# Patient Record
Sex: FEMALE | Race: WHITE | NOT HISPANIC OR LATINO | Employment: PART TIME | URBAN - METROPOLITAN AREA
[De-identification: names, ages, dates, MRNs, and addresses within clinical notes are randomized per-mention and may not be internally consistent; named-entity substitution may affect disease eponyms.]

---

## 2023-02-25 PROBLEM — D48.5 BASAL CELL NEOPLASM: Status: ACTIVE | Noted: 2023-02-25

## 2023-02-25 PROBLEM — K06.8 BLEEDING GUMS: Status: ACTIVE | Noted: 2023-02-25

## 2023-02-25 PROBLEM — S42.252A FRACTURE OF GREATER TUBEROSITY OF LEFT HUMERUS: Status: ACTIVE | Noted: 2023-02-25

## 2023-02-25 PROBLEM — I10 HYPERTENSION, ESSENTIAL: Status: ACTIVE | Noted: 2023-02-25

## 2023-02-25 PROBLEM — S46.012A TRAUMATIC TEAR OF LEFT ROTATOR CUFF: Status: ACTIVE | Noted: 2023-02-25

## 2023-02-25 PROBLEM — M25.519 SHOULDER PAIN: Status: ACTIVE | Noted: 2023-02-25

## 2023-02-25 PROBLEM — T78.40XA ALLERGIES: Status: ACTIVE | Noted: 2023-02-25

## 2023-02-25 LAB
ACTIVATED PARTIAL THROMBOPLASTIN TIME IN PPP BY COAGULATION ASSAY: 25 SEC (ref 26–39)
ALANINE AMINOTRANSFERASE (SGPT) (U/L) IN SER/PLAS: 46 U/L (ref 7–45)
ALBUMIN (G/DL) IN SER/PLAS: 4.6 G/DL (ref 3.4–5)
ALKALINE PHOSPHATASE (U/L) IN SER/PLAS: 70 U/L (ref 33–110)
ANION GAP IN SER/PLAS: 13 MMOL/L (ref 10–20)
ASPARTATE AMINOTRANSFERASE (SGOT) (U/L) IN SER/PLAS: 24 U/L (ref 9–39)
BASOPHILS (10*3/UL) IN BLOOD BY AUTOMATED COUNT: 0.06 X10E9/L (ref 0–0.1)
BASOPHILS/100 LEUKOCYTES IN BLOOD BY AUTOMATED COUNT: 0.8 % (ref 0–2)
BILIRUBIN TOTAL (MG/DL) IN SER/PLAS: 0.5 MG/DL (ref 0–1.2)
CALCIDIOL (25 OH VITAMIN D3) (NG/ML) IN SER/PLAS: 85 NG/ML
CALCIUM (MG/DL) IN SER/PLAS: 9.7 MG/DL (ref 8.6–10.3)
CARBON DIOXIDE, TOTAL (MMOL/L) IN SER/PLAS: 28 MMOL/L (ref 21–32)
CHLORIDE (MMOL/L) IN SER/PLAS: 102 MMOL/L (ref 98–107)
CHOLESTEROL (MG/DL) IN SER/PLAS: 261 MG/DL (ref 0–199)
CHOLESTEROL IN HDL (MG/DL) IN SER/PLAS: 19 MG/DL
CHOLESTEROL/HDL RATIO: 13.7
COBALAMIN (VITAMIN B12) (PG/ML) IN SER/PLAS: 590 PG/ML (ref 211–911)
CREATININE (MG/DL) IN SER/PLAS: 0.83 MG/DL (ref 0.5–1.05)
EOSINOPHILS (10*3/UL) IN BLOOD BY AUTOMATED COUNT: 0.26 X10E9/L (ref 0–0.7)
EOSINOPHILS/100 LEUKOCYTES IN BLOOD BY AUTOMATED COUNT: 3.4 % (ref 0–6)
ERYTHROCYTE DISTRIBUTION WIDTH (RATIO) BY AUTOMATED COUNT: 13.1 % (ref 11.5–14.5)
ERYTHROCYTE MEAN CORPUSCULAR HEMOGLOBIN CONCENTRATION (G/DL) BY AUTOMATED: 32.3 G/DL (ref 32–36)
ERYTHROCYTE MEAN CORPUSCULAR VOLUME (FL) BY AUTOMATED COUNT: 92 FL (ref 80–100)
ERYTHROCYTES (10*6/UL) IN BLOOD BY AUTOMATED COUNT: 4.63 X10E12/L (ref 4–5.2)
ESTIMATED AVERAGE GLUCOSE FOR HBA1C: 117 MG/DL
FERRITIN (UG/LL) IN SER/PLAS: 237 UG/L (ref 8–150)
GFR FEMALE: 82 ML/MIN/1.73M2
GLUCOSE (MG/DL) IN SER/PLAS: 102 MG/DL (ref 74–99)
HEMATOCRIT (%) IN BLOOD BY AUTOMATED COUNT: 42.7 % (ref 36–46)
HEMOGLOBIN (G/DL) IN BLOOD: 13.8 G/DL (ref 12–16)
HEMOGLOBIN A1C/HEMOGLOBIN TOTAL IN BLOOD: 5.7 %
IMMATURE GRANULOCYTES/100 LEUKOCYTES IN BLOOD BY AUTOMATED COUNT: 0.8 % (ref 0–0.9)
INR IN PPP BY COAGULATION ASSAY: 0.9 (ref 0.9–1.1)
IRON (UG/DL) IN SER/PLAS: 109 UG/DL (ref 35–150)
IRON BINDING CAPACITY (UG/DL) IN SER/PLAS: 367 UG/DL (ref 240–445)
IRON SATURATION (%) IN SER/PLAS: 30 % (ref 25–45)
LDL: 187 MG/DL (ref 0–99)
LEUKOCYTES (10*3/UL) IN BLOOD BY AUTOMATED COUNT: 7.7 X10E9/L (ref 4.4–11.3)
LYMPHOCYTES (10*3/UL) IN BLOOD BY AUTOMATED COUNT: 2.79 X10E9/L (ref 1.2–4.8)
LYMPHOCYTES/100 LEUKOCYTES IN BLOOD BY AUTOMATED COUNT: 36.1 % (ref 13–44)
MONOCYTES (10*3/UL) IN BLOOD BY AUTOMATED COUNT: 0.71 X10E9/L (ref 0.1–1)
MONOCYTES/100 LEUKOCYTES IN BLOOD BY AUTOMATED COUNT: 9.2 % (ref 2–10)
NEUTROPHILS (10*3/UL) IN BLOOD BY AUTOMATED COUNT: 3.85 X10E9/L (ref 1.2–7.7)
NEUTROPHILS/100 LEUKOCYTES IN BLOOD BY AUTOMATED COUNT: 49.7 % (ref 40–80)
NON HDL CHOLESTEROL: 242 MG/DL
PLATELETS (10*3/UL) IN BLOOD AUTOMATED COUNT: 334 X10E9/L (ref 150–450)
POTASSIUM (MMOL/L) IN SER/PLAS: 4.8 MMOL/L (ref 3.5–5.3)
PROTEIN TOTAL: 7.3 G/DL (ref 6.4–8.2)
PROTHROMBIN TIME (PT) IN PPP BY COAGULATION ASSAY: 10.8 SEC (ref 9.8–13.4)
SODIUM (MMOL/L) IN SER/PLAS: 138 MMOL/L (ref 136–145)
THYROTROPIN (MIU/L) IN SER/PLAS BY DETECTION LIMIT <= 0.05 MIU/L: 2.2 MIU/L (ref 0.44–3.98)
TRIGLYCERIDE (MG/DL) IN SER/PLAS: 274 MG/DL (ref 0–149)
UREA NITROGEN (MG/DL) IN SER/PLAS: 19 MG/DL (ref 6–23)
VLDL: 55 MG/DL (ref 0–40)

## 2023-02-25 RX ORDER — BUSPIRONE HYDROCHLORIDE 15 MG/1
0.5 TABLET ORAL DAILY
COMMUNITY
End: 2024-04-12

## 2023-02-25 RX ORDER — OMEPRAZOLE 20 MG/1
20 TABLET, DELAYED RELEASE ORAL
COMMUNITY

## 2023-02-25 RX ORDER — ASCORBIC ACID 500 MG
500 TABLET ORAL
COMMUNITY

## 2023-02-25 RX ORDER — MESALAMINE 0.38 G/1
0.38 CAPSULE, EXTENDED RELEASE ORAL
COMMUNITY
End: 2023-04-19 | Stop reason: SDUPTHER

## 2023-02-25 RX ORDER — COLESEVELAM 180 1/1
625 TABLET ORAL
COMMUNITY

## 2023-02-25 RX ORDER — TALC
3 POWDER (GRAM) TOPICAL NIGHTLY
COMMUNITY
End: 2023-09-13 | Stop reason: ALTCHOICE

## 2023-02-25 RX ORDER — ACETAMINOPHEN 500 MG
TABLET ORAL DAILY
COMMUNITY

## 2023-02-25 RX ORDER — MONTELUKAST SODIUM 10 MG/1
1 TABLET ORAL DAILY
COMMUNITY
End: 2024-03-25

## 2023-02-25 RX ORDER — EPINEPHRINE 0.3 MG/.3ML
INJECTION INTRAMUSCULAR AS NEEDED
COMMUNITY

## 2023-02-25 RX ORDER — CETIRIZINE HYDROCHLORIDE 10 MG/1
10 TABLET ORAL
COMMUNITY

## 2023-02-25 RX ORDER — NORTRIPTYLINE HYDROCHLORIDE 50 MG/1
50 CAPSULE ORAL NIGHTLY
COMMUNITY
End: 2023-04-19 | Stop reason: SDUPTHER

## 2023-03-07 ENCOUNTER — TELEPHONE (OUTPATIENT)
Dept: PRIMARY CARE | Facility: CLINIC | Age: 58
End: 2023-03-07
Payer: COMMERCIAL

## 2023-03-07 DIAGNOSIS — E78.00 ELEVATED CHOLESTEROL: ICD-10-CM

## 2023-03-07 DIAGNOSIS — R79.89 ELEVATED FERRITIN LEVEL: ICD-10-CM

## 2023-03-10 NOTE — TELEPHONE ENCOUNTER
2/26  bp 164/81 P 103  140/64 P 98  2/27 143/77 P 101  3/1  1743/72 p 94  3/2 157/85 P 98    3/4 140/82 P 98  3/5  140/78  P 94  3/7  147/80 P 89  Bp readings since starting medications    Patient currently on Metoprolol Succinate ER 25mg every day

## 2023-03-24 ENCOUNTER — OFFICE VISIT (OUTPATIENT)
Dept: PRIMARY CARE | Facility: CLINIC | Age: 58
End: 2023-03-24
Payer: COMMERCIAL

## 2023-03-24 VITALS
WEIGHT: 152 LBS | TEMPERATURE: 98 F | OXYGEN SATURATION: 96 % | DIASTOLIC BLOOD PRESSURE: 82 MMHG | HEART RATE: 92 BPM | SYSTOLIC BLOOD PRESSURE: 143 MMHG

## 2023-03-24 DIAGNOSIS — F41.9 ANXIETY: ICD-10-CM

## 2023-03-24 DIAGNOSIS — I10 HYPERTENSION, ESSENTIAL: Primary | ICD-10-CM

## 2023-03-24 DIAGNOSIS — Z12.4 SCREENING FOR CERVICAL CANCER: ICD-10-CM

## 2023-03-24 PROBLEM — E78.5 HYPERLIPIDEMIA: Status: ACTIVE | Noted: 2023-03-24

## 2023-03-24 PROCEDURE — 3077F SYST BP >= 140 MM HG: CPT | Performed by: INTERNAL MEDICINE

## 2023-03-24 PROCEDURE — 88175 CYTOPATH C/V AUTO FLUID REDO: CPT

## 2023-03-24 PROCEDURE — 1036F TOBACCO NON-USER: CPT | Performed by: INTERNAL MEDICINE

## 2023-03-24 PROCEDURE — 99213 OFFICE O/P EST LOW 20 MIN: CPT | Performed by: INTERNAL MEDICINE

## 2023-03-24 PROCEDURE — 3079F DIAST BP 80-89 MM HG: CPT | Performed by: INTERNAL MEDICINE

## 2023-03-24 RX ORDER — LISINOPRIL 10 MG/1
10 TABLET ORAL DAILY
Qty: 30 TABLET | Refills: 5 | Status: SHIPPED | OUTPATIENT
Start: 2023-03-24 | End: 2023-09-13

## 2023-03-24 ASSESSMENT — ENCOUNTER SYMPTOMS
CHEST TIGHTNESS: 0
VOMITING: 0
ABDOMINAL PAIN: 0
NUMBNESS: 0
WEAKNESS: 0
BLOOD IN STOOL: 0
SHORTNESS OF BREATH: 0
DIARRHEA: 0
FEVER: 0
ARTHRALGIAS: 0
BACK PAIN: 0
APPETITE CHANGE: 0
FREQUENCY: 0

## 2023-03-24 ASSESSMENT — PATIENT HEALTH QUESTIONNAIRE - PHQ9
SUM OF ALL RESPONSES TO PHQ9 QUESTIONS 1 AND 2: 0
2. FEELING DOWN, DEPRESSED OR HOPELESS: NOT AT ALL
1. LITTLE INTEREST OR PLEASURE IN DOING THINGS: NOT AT ALL

## 2023-03-24 NOTE — PROGRESS NOTES
Subjective   Patient ID: Jolly Adame is a 57 y.o. female who presents for No chief complaint on file..  HPI  Bp 140-150   Stress with work , living out of boxes  No cp , no sob  Not depressed  Exercising now      Review of Systems   Constitutional:  Negative for appetite change and fever.   HENT:  Negative for ear discharge and ear pain.    Respiratory:  Negative for chest tightness and shortness of breath.    Cardiovascular:  Negative for chest pain and leg swelling.   Gastrointestinal:  Negative for abdominal pain, blood in stool, diarrhea and vomiting.   Genitourinary:  Negative for frequency.   Musculoskeletal:  Negative for arthralgias and back pain.   Skin:  Negative for rash.   Neurological:  Negative for weakness and numbness.   Psychiatric/Behavioral:  Negative for behavioral problems.         No depression or anxiety interfering with daily life.       Objective   Physical Exam  Exam conducted with a chaperone present.   Constitutional:       General: She is not in acute distress.     Appearance: Normal appearance.   HENT:      Head: Normocephalic.   Cardiovascular:      Rate and Rhythm: Normal rate and regular rhythm.      Heart sounds: Normal heart sounds. No murmur heard.  Pulmonary:      Effort: Pulmonary effort is normal.      Breath sounds: Normal breath sounds.   Chest:   Breasts:     Right: Normal.      Left: Normal.   Abdominal:      General: There is no distension.      Palpations: Abdomen is soft.      Tenderness: There is no abdominal tenderness. There is no guarding.   Genitourinary:     General: Normal vulva.      Comments: Nl exam, no masses, pap done  Musculoskeletal:         General: Normal range of motion.      Cervical back: Normal range of motion and neck supple.   Skin:     General: Skin is warm and dry.   Neurological:      General: No focal deficit present.      Mental Status: She is alert.   Psychiatric:         Mood and Affect: Mood normal.       Lab Results   Component Value  Date    WBC 7.7 02/25/2023    HGB 13.8 02/25/2023    HCT 42.7 02/25/2023    MCV 92 02/25/2023     02/25/2023     Lab Results   Component Value Date    GLUCOSE 102 (H) 02/25/2023    CALCIUM 9.7 02/25/2023     02/25/2023    K 4.8 02/25/2023    CO2 28 02/25/2023     02/25/2023    BUN 19 02/25/2023    CREATININE 0.83 02/25/2023     Social History     Socioeconomic History    Marital status:      Spouse name: Not on file    Number of children: Not on file    Years of education: Not on file    Highest education level: Not on file   Occupational History    Not on file   Tobacco Use    Smoking status: Not on file    Smokeless tobacco: Not on file   Vaping Use    Vaping status: Not on file   Substance and Sexual Activity    Alcohol use: Not on file    Drug use: Not on file    Sexual activity: Not on file   Other Topics Concern    Not on file   Social History Narrative    Not on file     Social Determinants of Health     Financial Resource Strain: Not on file   Food Insecurity: Not on file   Transportation Needs: Not on file   Physical Activity: Not on file   Stress: Not on file   Social Connections: Not on file   Intimate Partner Violence: Not on file   Housing Stability: Not on file     Family History   Problem Relation Name Age of Onset    Stroke Mother      Dementia Mother      Heart attack Mother      Diabetes Sister      Cancer Daughter      Breast cancer Other Grandmother        Assessment/Plan   Problem List Items Addressed This Visit    Kailey Azevedo was seen today for hypertension and gynecologic exam.  Diagnoses and all orders for this visit:  Hypertension, essential (Primary)  -     lisinopril 10 mg tablet; Take 1 tablet (10 mg) by mouth once daily.  Anxiety  Screening for cervical cancer  -     THINPREP PAP TEST  -     THINPREP PAP TEST    Chronic conditions reviewed in the assessment and plan.    Continue medications unless specified otherwise.  Previous labs reviewed.        There are no  diagnoses linked to this encounter.

## 2023-03-30 LAB
COMPLETE PATHOLOGY REPORT: NORMAL
CONVERTED CLINICAL DIAGNOSIS-HISTORY: NORMAL
CONVERTED DIAGNOSIS COMMENT: NORMAL
CONVERTED FINAL DIAGNOSIS: NORMAL
CONVERTED FINAL REPORT PDF LINK TO COPY AND PASTE: NORMAL

## 2023-04-06 ENCOUNTER — LAB (OUTPATIENT)
Dept: LAB | Facility: LAB | Age: 58
End: 2023-04-06
Payer: COMMERCIAL

## 2023-04-06 DIAGNOSIS — R79.89 ELEVATED FERRITIN LEVEL: ICD-10-CM

## 2023-04-06 DIAGNOSIS — E78.00 ELEVATED CHOLESTEROL: ICD-10-CM

## 2023-04-06 LAB
ALANINE AMINOTRANSFERASE (SGPT) (U/L) IN SER/PLAS: 40 U/L (ref 7–45)
ALBUMIN (G/DL) IN SER/PLAS: 4.4 G/DL (ref 3.4–5)
ALKALINE PHOSPHATASE (U/L) IN SER/PLAS: 71 U/L (ref 33–110)
ASPARTATE AMINOTRANSFERASE (SGOT) (U/L) IN SER/PLAS: 26 U/L (ref 9–39)
BASOPHILS (10*3/UL) IN BLOOD BY AUTOMATED COUNT: 0.04 X10E9/L (ref 0–0.1)
BASOPHILS/100 LEUKOCYTES IN BLOOD BY AUTOMATED COUNT: 0.6 % (ref 0–2)
BILIRUBIN DIRECT (MG/DL) IN SER/PLAS: 0.1 MG/DL (ref 0–0.3)
BILIRUBIN TOTAL (MG/DL) IN SER/PLAS: 0.7 MG/DL (ref 0–1.2)
CHOLESTEROL (MG/DL) IN SER/PLAS: 252 MG/DL (ref 0–199)
CHOLESTEROL IN HDL (MG/DL) IN SER/PLAS: 20.7 MG/DL
CHOLESTEROL/HDL RATIO: 12.2
EOSINOPHILS (10*3/UL) IN BLOOD BY AUTOMATED COUNT: 0.21 X10E9/L (ref 0–0.7)
EOSINOPHILS/100 LEUKOCYTES IN BLOOD BY AUTOMATED COUNT: 3.3 % (ref 0–6)
ERYTHROCYTE DISTRIBUTION WIDTH (RATIO) BY AUTOMATED COUNT: 12.7 % (ref 11.5–14.5)
ERYTHROCYTE MEAN CORPUSCULAR HEMOGLOBIN CONCENTRATION (G/DL) BY AUTOMATED: 32.4 G/DL (ref 32–36)
ERYTHROCYTE MEAN CORPUSCULAR VOLUME (FL) BY AUTOMATED COUNT: 92 FL (ref 80–100)
ERYTHROCYTES (10*6/UL) IN BLOOD BY AUTOMATED COUNT: 4.49 X10E12/L (ref 4–5.2)
FERRITIN (UG/LL) IN SER/PLAS: 201 UG/L (ref 8–150)
HEMATOCRIT (%) IN BLOOD BY AUTOMATED COUNT: 41.4 % (ref 36–46)
HEMOGLOBIN (G/DL) IN BLOOD: 13.4 G/DL (ref 12–16)
IMMATURE GRANULOCYTES/100 LEUKOCYTES IN BLOOD BY AUTOMATED COUNT: 0.5 % (ref 0–0.9)
IRON (UG/DL) IN SER/PLAS: 146 UG/DL (ref 35–150)
IRON BINDING CAPACITY (UG/DL) IN SER/PLAS: 373 UG/DL (ref 240–445)
IRON SATURATION (%) IN SER/PLAS: 39 % (ref 25–45)
LDL: 175 MG/DL (ref 0–99)
LEUKOCYTES (10*3/UL) IN BLOOD BY AUTOMATED COUNT: 6.5 X10E9/L (ref 4.4–11.3)
LYMPHOCYTES (10*3/UL) IN BLOOD BY AUTOMATED COUNT: 2.56 X10E9/L (ref 1.2–4.8)
LYMPHOCYTES/100 LEUKOCYTES IN BLOOD BY AUTOMATED COUNT: 39.6 % (ref 13–44)
MONOCYTES (10*3/UL) IN BLOOD BY AUTOMATED COUNT: 0.44 X10E9/L (ref 0.1–1)
MONOCYTES/100 LEUKOCYTES IN BLOOD BY AUTOMATED COUNT: 6.8 % (ref 2–10)
NEUTROPHILS (10*3/UL) IN BLOOD BY AUTOMATED COUNT: 3.18 X10E9/L (ref 1.2–7.7)
NEUTROPHILS/100 LEUKOCYTES IN BLOOD BY AUTOMATED COUNT: 49.2 % (ref 40–80)
NON HDL CHOLESTEROL: 231 MG/DL
PLATELETS (10*3/UL) IN BLOOD AUTOMATED COUNT: 310 X10E9/L (ref 150–450)
PROTEIN TOTAL: 6.8 G/DL (ref 6.4–8.2)
SEDIMENTATION RATE, ERYTHROCYTE: 10 MM/H (ref 0–30)
TRIGLYCERIDE (MG/DL) IN SER/PLAS: 283 MG/DL (ref 0–149)
VLDL: 57 MG/DL (ref 0–40)

## 2023-04-06 PROCEDURE — 85652 RBC SED RATE AUTOMATED: CPT

## 2023-04-06 PROCEDURE — 82728 ASSAY OF FERRITIN: CPT

## 2023-04-06 PROCEDURE — 80076 HEPATIC FUNCTION PANEL: CPT

## 2023-04-06 PROCEDURE — 36415 COLL VENOUS BLD VENIPUNCTURE: CPT

## 2023-04-06 PROCEDURE — 80061 LIPID PANEL: CPT

## 2023-04-06 PROCEDURE — 83540 ASSAY OF IRON: CPT

## 2023-04-06 PROCEDURE — 83550 IRON BINDING TEST: CPT

## 2023-04-06 PROCEDURE — 85025 COMPLETE CBC W/AUTO DIFF WBC: CPT

## 2023-04-07 DIAGNOSIS — E78.5 HYPERLIPIDEMIA, UNSPECIFIED HYPERLIPIDEMIA TYPE: ICD-10-CM

## 2023-04-10 RX ORDER — SIMVASTATIN 20 MG/1
20 TABLET, FILM COATED ORAL NIGHTLY
Qty: 30 TABLET | Refills: 11 | Status: SHIPPED | OUTPATIENT
Start: 2023-04-10 | End: 2024-04-12

## 2023-04-19 DIAGNOSIS — F41.9 ANXIETY: ICD-10-CM

## 2023-04-19 DIAGNOSIS — K51.919 ULCERATIVE COLITIS WITH COMPLICATION, UNSPECIFIED LOCATION (MULTI): ICD-10-CM

## 2023-04-19 DIAGNOSIS — I10 HYPERTENSION, ESSENTIAL: Primary | ICD-10-CM

## 2023-04-19 DIAGNOSIS — E78.5 HYPERLIPIDEMIA, UNSPECIFIED HYPERLIPIDEMIA TYPE: ICD-10-CM

## 2023-04-19 RX ORDER — NORTRIPTYLINE HYDROCHLORIDE 50 MG/1
CAPSULE ORAL
Qty: 60 CAPSULE | Refills: 11 | Status: SHIPPED | OUTPATIENT
Start: 2023-04-19 | End: 2023-07-19 | Stop reason: SDUPTHER

## 2023-04-19 RX ORDER — METOPROLOL SUCCINATE 25 MG/1
25 TABLET, EXTENDED RELEASE ORAL 2 TIMES DAILY
Qty: 60 TABLET | Refills: 11 | Status: SHIPPED | OUTPATIENT
Start: 2023-04-19 | End: 2024-04-10

## 2023-04-19 RX ORDER — MESALAMINE 0.38 G/1
1.5 CAPSULE, EXTENDED RELEASE ORAL DAILY
Qty: 90 CAPSULE | Refills: 3 | Status: SHIPPED | OUTPATIENT
Start: 2023-04-19 | End: 2023-07-05

## 2023-06-07 ENCOUNTER — TELEPHONE (OUTPATIENT)
Dept: PRIMARY CARE | Facility: CLINIC | Age: 58
End: 2023-06-07

## 2023-06-07 NOTE — TELEPHONE ENCOUNTER
Type of form: Physical form for  leader/ last CPE 3/24/41929  Received from: patient's spouse via walk in for completion and provider's signature   Call patient when completed  Form placed in PCP box front office.

## 2023-06-08 PROBLEM — S42.255A: Status: ACTIVE | Noted: 2022-12-01

## 2023-06-08 PROBLEM — G47.9 SLEEP DIFFICULTIES: Status: ACTIVE | Noted: 2023-06-08

## 2023-06-08 PROBLEM — F41.9 ANXIETY DISORDER, UNSPECIFIED: Status: ACTIVE | Noted: 2023-03-24

## 2023-06-08 PROBLEM — R06.83 SNORING: Status: ACTIVE | Noted: 2023-06-08

## 2023-06-08 PROBLEM — U07.1 COVID-19: Status: ACTIVE | Noted: 2022-11-09

## 2023-07-05 DIAGNOSIS — K51.919 ULCERATIVE COLITIS WITH COMPLICATION, UNSPECIFIED LOCATION (MULTI): ICD-10-CM

## 2023-07-05 DIAGNOSIS — F41.9 ANXIETY: ICD-10-CM

## 2023-07-05 DIAGNOSIS — I10 HYPERTENSION, ESSENTIAL: ICD-10-CM

## 2023-07-05 DIAGNOSIS — E78.5 HYPERLIPIDEMIA, UNSPECIFIED HYPERLIPIDEMIA TYPE: ICD-10-CM

## 2023-07-05 RX ORDER — MESALAMINE 0.38 G/1
CAPSULE, EXTENDED RELEASE ORAL
Qty: 90 CAPSULE | Refills: 0 | Status: SHIPPED | OUTPATIENT
Start: 2023-07-05 | End: 2023-08-04

## 2023-07-07 ENCOUNTER — APPOINTMENT (OUTPATIENT)
Dept: PRIMARY CARE | Facility: CLINIC | Age: 58
End: 2023-07-07
Payer: COMMERCIAL

## 2023-07-19 ENCOUNTER — APPOINTMENT (OUTPATIENT)
Dept: PRIMARY CARE | Facility: CLINIC | Age: 58
End: 2023-07-19
Payer: COMMERCIAL

## 2023-07-19 ENCOUNTER — TELEMEDICINE (OUTPATIENT)
Dept: PRIMARY CARE | Facility: CLINIC | Age: 58
End: 2023-07-19
Payer: COMMERCIAL

## 2023-07-19 DIAGNOSIS — J01.10 ACUTE NON-RECURRENT FRONTAL SINUSITIS: Primary | ICD-10-CM

## 2023-07-19 DIAGNOSIS — J98.8 RESPIRATORY INFECTION: ICD-10-CM

## 2023-07-19 DIAGNOSIS — F41.9 ANXIETY: ICD-10-CM

## 2023-07-19 PROCEDURE — 99213 OFFICE O/P EST LOW 20 MIN: CPT | Performed by: NURSE PRACTITIONER

## 2023-07-19 RX ORDER — BENZONATATE 200 MG/1
200 CAPSULE ORAL 3 TIMES DAILY PRN
Qty: 42 CAPSULE | Refills: 0 | Status: SHIPPED | OUTPATIENT
Start: 2023-07-19 | End: 2023-08-18

## 2023-07-19 RX ORDER — AMOXICILLIN AND CLAVULANATE POTASSIUM 875; 125 MG/1; MG/1
875 TABLET, FILM COATED ORAL 2 TIMES DAILY
Qty: 14 TABLET | Refills: 0 | Status: SHIPPED | OUTPATIENT
Start: 2023-07-19 | End: 2023-07-26

## 2023-07-19 RX ORDER — CODEINE PHOSPHATE AND GUAIFENESIN 10; 100 MG/5ML; MG/5ML
10 SOLUTION ORAL EVERY 6 HOURS PRN
Qty: 300 ML | Refills: 0 | Status: SHIPPED | OUTPATIENT
Start: 2023-07-19 | End: 2023-07-26

## 2023-07-19 RX ORDER — NORTRIPTYLINE HYDROCHLORIDE 50 MG/1
CAPSULE ORAL
Qty: 60 CAPSULE | Refills: 11 | Status: SHIPPED | OUTPATIENT
Start: 2023-07-19 | End: 2023-07-24 | Stop reason: SDUPTHER

## 2023-07-19 NOTE — PROGRESS NOTES
An interactive audio and video telecommunication system which permits real time communications between the patient (at the originating site) and provider (at the distant site) was utilized to provide this telehealth service.  Verbal consent was requested and obtained from the patient for this telehealth visit.     Subjective   Patient ID: Jolly Adame is a 58 y.o. female who presents for Cough.    Ongoing dry cough x 1 week  Started about 10 days ago  Sx HA, fatigue, nasal congestion, coughing very bad at night that she vomits  Recent travel on a cruise  Was feeling ill on the trip  Covid negative last test was 7/14/23  Can sleep due to coughing  Using cough drops  Causing nausea and vomiting  Cough syrup not working  Felt better than worse        Review of Systems  ROS completely negative except what was mentioned in the HPI.  Problem List, surgical, social, and family histories which were reviewed and updated as necessary within the EMR. I also personally reviewed the notes, labs, and imaging that pertained to what was documented or discussed in the HPI.      Objective   Physical Exam  Vitals and nursing note reviewed.   Constitutional:       Appearance: Normal appearance.   HENT:      Head: Normocephalic and atraumatic.      Right Ear: External ear normal.      Left Ear: External ear normal.      Nose: Nose normal.      Mouth/Throat:      Mouth: Mucous membranes are moist.   Eyes:      Extraocular Movements: Extraocular movements intact.      Conjunctiva/sclera: Conjunctivae normal.   Pulmonary:      Effort: Pulmonary effort is normal.   Musculoskeletal:      Cervical back: Normal range of motion and neck supple.   Neurological:      General: No focal deficit present.      Mental Status: She is alert and oriented to person, place, and time. Mental status is at baseline.   Psychiatric:         Mood and Affect: Mood normal.         Behavior: Behavior normal.         Thought Content: Thought content normal.          Judgment: Judgment normal.         There were no vitals taken for this visit.    Assessment/Plan    Problem List Items Addressed This Visit       Anxiety    Relevant Medications    nortriptyline (Pamelor) 50 mg capsule    Respiratory infection    Current Assessment & Plan     Will begin tx with augmentin, tessalon  Cough with codeine on hold at pharmacy if she decides to fill  She will notify if not improving, consider cxr         Relevant Medications    amoxicillin-pot clavulanate (Augmentin) 875-125 mg tablet    codeine-guaifenesin (Robitussin-AC)  mg/5 mL syrup    benzonatate (Tessalon) 200 mg capsule     Other Visit Diagnoses       Acute non-recurrent frontal sinusitis    -  Primary    Relevant Medications    amoxicillin-pot clavulanate (Augmentin) 875-125 mg tablet    codeine-guaifenesin (Robitussin-AC)  mg/5 mL syrup

## 2023-07-24 PROBLEM — J98.8 RESPIRATORY INFECTION: Status: ACTIVE | Noted: 2023-07-24

## 2023-07-24 PROBLEM — U07.1 COVID-19: Status: RESOLVED | Noted: 2022-11-09 | Resolved: 2023-07-24

## 2023-07-24 RX ORDER — NORTRIPTYLINE HYDROCHLORIDE 50 MG/1
CAPSULE ORAL
Qty: 60 CAPSULE | Refills: 11
Start: 2023-07-24

## 2023-07-24 NOTE — ASSESSMENT & PLAN NOTE
Will begin tx with augmentin, tessalon  Cough with codeine on hold at pharmacy if she decides to fill  She will notify if not improving, consider cxr

## 2023-08-04 DIAGNOSIS — F41.9 ANXIETY: ICD-10-CM

## 2023-08-04 DIAGNOSIS — I10 HYPERTENSION, ESSENTIAL: ICD-10-CM

## 2023-08-04 DIAGNOSIS — K51.919 ULCERATIVE COLITIS WITH COMPLICATION, UNSPECIFIED LOCATION (MULTI): ICD-10-CM

## 2023-08-04 DIAGNOSIS — E78.5 HYPERLIPIDEMIA, UNSPECIFIED HYPERLIPIDEMIA TYPE: ICD-10-CM

## 2023-08-04 RX ORDER — MESALAMINE 0.38 G/1
CAPSULE, EXTENDED RELEASE ORAL
Qty: 90 CAPSULE | Refills: 0 | Status: SHIPPED | OUTPATIENT
Start: 2023-08-04 | End: 2023-09-05 | Stop reason: SDUPTHER

## 2023-09-05 DIAGNOSIS — F41.9 ANXIETY: ICD-10-CM

## 2023-09-05 DIAGNOSIS — E78.5 HYPERLIPIDEMIA, UNSPECIFIED HYPERLIPIDEMIA TYPE: ICD-10-CM

## 2023-09-05 DIAGNOSIS — K51.919 ULCERATIVE COLITIS WITH COMPLICATION, UNSPECIFIED LOCATION (MULTI): ICD-10-CM

## 2023-09-05 DIAGNOSIS — I10 HYPERTENSION, ESSENTIAL: ICD-10-CM

## 2023-09-05 RX ORDER — MESALAMINE 0.38 G/1
1.5 CAPSULE, EXTENDED RELEASE ORAL DAILY
Qty: 360 CAPSULE | Refills: 3 | Status: SHIPPED
Start: 2023-09-05 | End: 2023-09-08 | Stop reason: SDUPTHER

## 2023-09-08 DIAGNOSIS — E78.5 HYPERLIPIDEMIA, UNSPECIFIED HYPERLIPIDEMIA TYPE: ICD-10-CM

## 2023-09-08 DIAGNOSIS — K51.919 ULCERATIVE COLITIS WITH COMPLICATION, UNSPECIFIED LOCATION (MULTI): ICD-10-CM

## 2023-09-08 DIAGNOSIS — F41.9 ANXIETY: ICD-10-CM

## 2023-09-08 DIAGNOSIS — I10 HYPERTENSION, ESSENTIAL: ICD-10-CM

## 2023-09-08 RX ORDER — MESALAMINE 0.38 G/1
1.5 CAPSULE, EXTENDED RELEASE ORAL DAILY
Qty: 360 CAPSULE | Refills: 3 | Status: SHIPPED | OUTPATIENT
Start: 2023-09-08 | End: 2024-09-07

## 2023-09-13 ENCOUNTER — LAB (OUTPATIENT)
Dept: LAB | Facility: LAB | Age: 58
End: 2023-09-13
Payer: COMMERCIAL

## 2023-09-13 ENCOUNTER — OFFICE VISIT (OUTPATIENT)
Dept: PRIMARY CARE | Facility: CLINIC | Age: 58
End: 2023-09-13
Payer: COMMERCIAL

## 2023-09-13 VITALS
WEIGHT: 158 LBS | DIASTOLIC BLOOD PRESSURE: 96 MMHG | TEMPERATURE: 97.7 F | SYSTOLIC BLOOD PRESSURE: 156 MMHG | HEART RATE: 80 BPM | BODY MASS INDEX: 29.08 KG/M2 | HEIGHT: 62 IN | OXYGEN SATURATION: 98 %

## 2023-09-13 DIAGNOSIS — K52.9 COLITIS: ICD-10-CM

## 2023-09-13 DIAGNOSIS — I10 HYPERTENSION, ESSENTIAL: ICD-10-CM

## 2023-09-13 DIAGNOSIS — E78.2 MIXED HYPERLIPIDEMIA: ICD-10-CM

## 2023-09-13 DIAGNOSIS — E78.5 HYPERLIPIDEMIA, UNSPECIFIED HYPERLIPIDEMIA TYPE: ICD-10-CM

## 2023-09-13 DIAGNOSIS — R05.9 COUGH, UNSPECIFIED TYPE: ICD-10-CM

## 2023-09-13 DIAGNOSIS — F41.9 ANXIETY: ICD-10-CM

## 2023-09-13 DIAGNOSIS — I10 HYPERTENSION, ESSENTIAL: Primary | ICD-10-CM

## 2023-09-13 PROBLEM — J98.8 RESPIRATORY INFECTION: Status: RESOLVED | Noted: 2023-07-24 | Resolved: 2023-09-13

## 2023-09-13 LAB
ALANINE AMINOTRANSFERASE (SGPT) (U/L) IN SER/PLAS: 27 U/L (ref 7–45)
ALBUMIN (G/DL) IN SER/PLAS: 4.6 G/DL (ref 3.4–5)
ALKALINE PHOSPHATASE (U/L) IN SER/PLAS: 71 U/L (ref 33–110)
ANION GAP IN SER/PLAS: 12 MMOL/L (ref 10–20)
ASPARTATE AMINOTRANSFERASE (SGOT) (U/L) IN SER/PLAS: 18 U/L (ref 9–39)
BASOPHILS (10*3/UL) IN BLOOD BY AUTOMATED COUNT: 0.05 X10E9/L (ref 0–0.1)
BASOPHILS/100 LEUKOCYTES IN BLOOD BY AUTOMATED COUNT: 0.7 % (ref 0–2)
BILIRUBIN TOTAL (MG/DL) IN SER/PLAS: 0.4 MG/DL (ref 0–1.2)
CALCIDIOL (25 OH VITAMIN D3) (NG/ML) IN SER/PLAS: 54 NG/ML
CALCIUM (MG/DL) IN SER/PLAS: 9.7 MG/DL (ref 8.6–10.3)
CARBON DIOXIDE, TOTAL (MMOL/L) IN SER/PLAS: 30 MMOL/L (ref 21–32)
CHLORIDE (MMOL/L) IN SER/PLAS: 102 MMOL/L (ref 98–107)
CHOLESTEROL (MG/DL) IN SER/PLAS: 146 MG/DL (ref 0–199)
CHOLESTEROL IN HDL (MG/DL) IN SER/PLAS: 22.8 MG/DL
CHOLESTEROL/HDL RATIO: 6.4
CREATININE (MG/DL) IN SER/PLAS: 0.85 MG/DL (ref 0.5–1.05)
EOSINOPHILS (10*3/UL) IN BLOOD BY AUTOMATED COUNT: 0.25 X10E9/L (ref 0–0.7)
EOSINOPHILS/100 LEUKOCYTES IN BLOOD BY AUTOMATED COUNT: 3.6 % (ref 0–6)
ERYTHROCYTE DISTRIBUTION WIDTH (RATIO) BY AUTOMATED COUNT: 12.7 % (ref 11.5–14.5)
ERYTHROCYTE MEAN CORPUSCULAR HEMOGLOBIN CONCENTRATION (G/DL) BY AUTOMATED: 32.3 G/DL (ref 32–36)
ERYTHROCYTE MEAN CORPUSCULAR VOLUME (FL) BY AUTOMATED COUNT: 92 FL (ref 80–100)
ERYTHROCYTES (10*6/UL) IN BLOOD BY AUTOMATED COUNT: 4.43 X10E12/L (ref 4–5.2)
ESTIMATED AVERAGE GLUCOSE FOR HBA1C: 123 MG/DL
FERRITIN (UG/LL) IN SER/PLAS: 139 UG/L (ref 8–150)
GFR FEMALE: 80 ML/MIN/1.73M2
GLUCOSE (MG/DL) IN SER/PLAS: 112 MG/DL (ref 74–99)
HEMATOCRIT (%) IN BLOOD BY AUTOMATED COUNT: 40.9 % (ref 36–46)
HEMOGLOBIN (G/DL) IN BLOOD: 13.2 G/DL (ref 12–16)
HEMOGLOBIN A1C/HEMOGLOBIN TOTAL IN BLOOD: 5.9 %
IMMATURE GRANULOCYTES/100 LEUKOCYTES IN BLOOD BY AUTOMATED COUNT: 0.3 % (ref 0–0.9)
IRON (UG/DL) IN SER/PLAS: 87 UG/DL (ref 35–150)
IRON BINDING CAPACITY (UG/DL) IN SER/PLAS: 376 UG/DL (ref 240–445)
IRON SATURATION (%) IN SER/PLAS: 23 % (ref 25–45)
LDL: 85 MG/DL (ref 0–99)
LEUKOCYTES (10*3/UL) IN BLOOD BY AUTOMATED COUNT: 7 X10E9/L (ref 4.4–11.3)
LYMPHOCYTES (10*3/UL) IN BLOOD BY AUTOMATED COUNT: 2.33 X10E9/L (ref 1.2–4.8)
LYMPHOCYTES/100 LEUKOCYTES IN BLOOD BY AUTOMATED COUNT: 33.4 % (ref 13–44)
MONOCYTES (10*3/UL) IN BLOOD BY AUTOMATED COUNT: 0.55 X10E9/L (ref 0.1–1)
MONOCYTES/100 LEUKOCYTES IN BLOOD BY AUTOMATED COUNT: 7.9 % (ref 2–10)
NEUTROPHILS (10*3/UL) IN BLOOD BY AUTOMATED COUNT: 3.77 X10E9/L (ref 1.2–7.7)
NEUTROPHILS/100 LEUKOCYTES IN BLOOD BY AUTOMATED COUNT: 54.1 % (ref 40–80)
PLATELETS (10*3/UL) IN BLOOD AUTOMATED COUNT: 301 X10E9/L (ref 150–450)
POTASSIUM (MMOL/L) IN SER/PLAS: 4.4 MMOL/L (ref 3.5–5.3)
PROTEIN TOTAL: 6.8 G/DL (ref 6.4–8.2)
SEDIMENTATION RATE, ERYTHROCYTE: 2 MM/H (ref 0–30)
SODIUM (MMOL/L) IN SER/PLAS: 140 MMOL/L (ref 136–145)
THYROTROPIN (MIU/L) IN SER/PLAS BY DETECTION LIMIT <= 0.05 MIU/L: 1.98 MIU/L (ref 0.44–3.98)
TRIGLYCERIDE (MG/DL) IN SER/PLAS: 192 MG/DL (ref 0–149)
UREA NITROGEN (MG/DL) IN SER/PLAS: 10 MG/DL (ref 6–23)
VLDL: 38 MG/DL (ref 0–40)

## 2023-09-13 PROCEDURE — 82306 VITAMIN D 25 HYDROXY: CPT

## 2023-09-13 PROCEDURE — 83550 IRON BINDING TEST: CPT

## 2023-09-13 PROCEDURE — 86003 ALLG SPEC IGE CRUDE XTRC EA: CPT

## 2023-09-13 PROCEDURE — 82728 ASSAY OF FERRITIN: CPT

## 2023-09-13 PROCEDURE — 85652 RBC SED RATE AUTOMATED: CPT

## 2023-09-13 PROCEDURE — 83540 ASSAY OF IRON: CPT

## 2023-09-13 PROCEDURE — 84443 ASSAY THYROID STIM HORMONE: CPT

## 2023-09-13 PROCEDURE — 36415 COLL VENOUS BLD VENIPUNCTURE: CPT

## 2023-09-13 PROCEDURE — 85025 COMPLETE CBC W/AUTO DIFF WBC: CPT

## 2023-09-13 PROCEDURE — 82785 ASSAY OF IGE: CPT

## 2023-09-13 PROCEDURE — 80053 COMPREHEN METABOLIC PANEL: CPT

## 2023-09-13 PROCEDURE — 83036 HEMOGLOBIN GLYCOSYLATED A1C: CPT

## 2023-09-13 PROCEDURE — 3077F SYST BP >= 140 MM HG: CPT | Performed by: INTERNAL MEDICINE

## 2023-09-13 PROCEDURE — 80061 LIPID PANEL: CPT

## 2023-09-13 PROCEDURE — 1036F TOBACCO NON-USER: CPT | Performed by: INTERNAL MEDICINE

## 2023-09-13 PROCEDURE — 99214 OFFICE O/P EST MOD 30 MIN: CPT | Performed by: INTERNAL MEDICINE

## 2023-09-13 PROCEDURE — 3080F DIAST BP >= 90 MM HG: CPT | Performed by: INTERNAL MEDICINE

## 2023-09-13 RX ORDER — DILTIAZEM HYDROCHLORIDE 120 MG/1
120 CAPSULE, COATED, EXTENDED RELEASE ORAL DAILY
Qty: 30 CAPSULE | Refills: 5 | Status: SHIPPED | OUTPATIENT
Start: 2023-09-13 | End: 2024-03-07

## 2023-09-13 RX ORDER — GLUCOSAMINE/MSM/CHONDROIT SULF 500-166.6
1 TABLET ORAL DAILY
COMMUNITY
Start: 2023-02-24 | End: 2023-09-13 | Stop reason: ALTCHOICE

## 2023-09-13 RX ORDER — ACETAMINOPHEN, DIPHENHYDRAMINE HCL, PHENYLEPHRINE HCL 325; 25; 5 MG/1; MG/1; MG/1
1 TABLET ORAL NIGHTLY
COMMUNITY

## 2023-09-13 RX ORDER — LISINOPRIL 10 MG/1
10 TABLET ORAL DAILY
Qty: 30 TABLET | Refills: 11 | Status: SHIPPED | OUTPATIENT
Start: 2023-09-13 | End: 2023-09-13 | Stop reason: SINTOL

## 2023-09-13 NOTE — PROGRESS NOTES
"Subjective   Patient ID: Jolly Adame is a 58 y.o. female who presents for Follow-up (Cough since June ongoing/Was ill in June and had covid not been able to get rid of the cough/).  HPI  Went on cruise  June 29th  Did vv cough syrup w codeine  Neg covid  Test pos   Kids better  No fever  Can dry heave due to cough  Ho allergies this time of year  Dry heaving  Overall busy  Gone a lot  2 times cp when anxious only  otherwise none  No sob  No arm pain     Review of Systems  Gen:  no fever  HEENT:  no trouble swallowing  CV:  no dyspnea, cyanosis  Lungs:  no shortness of breath  GI:  no constipation, no blood in stool  Vascular:  no edema  Neuro:   no weakness  Skin:  no rash  MS:no joint swelling  Gu:  no urinary complaints  All other systems have been reviewed and are negative for complaint    BP (!) 156/96   Pulse 80   Temp 36.5 °C (97.7 °F) (Temporal)   Ht 1.575 m (5' 2\")   Wt 71.7 kg (158 lb)   SpO2 98%   BMI 28.90 kg/m²   Objective   Physical Exam  Lab Results   Component Value Date    WBC 7.0 09/13/2023    HGB 13.2 09/13/2023    HCT 40.9 09/13/2023    MCV 92 09/13/2023     09/13/2023     Lab Results   Component Value Date    GLUCOSE 112 (H) 09/13/2023    CALCIUM 9.7 09/13/2023     09/13/2023    K 4.4 09/13/2023    CO2 30 09/13/2023     09/13/2023    BUN 10 09/13/2023    CREATININE 0.85 09/13/2023     Social History     Socioeconomic History    Marital status:      Spouse name: None    Number of children: None    Years of education: None    Highest education level: None   Occupational History    None   Tobacco Use    Smoking status: Never    Smokeless tobacco: Never   Substance and Sexual Activity    Alcohol use: Yes    Drug use: None    Sexual activity: None   Other Topics Concern    None   Social History Narrative    None     Social Determinants of Health     Financial Resource Strain: Not on file   Food Insecurity: Not on file   Transportation Needs: Not on file   Physical " Activity: Not on file   Stress: Not on file   Social Connections: Not on file   Intimate Partner Violence: Not on file   Housing Stability: Not on file     Family History   Problem Relation Name Age of Onset    Stroke Mother      Dementia Mother      Heart attack Mother      Diabetes Sister      Cancer Daughter      Breast cancer Other Grandmother        General:  Alery and in  NAD  Heent:  tms nl, throat clear.     Lungs, CTAB  Skin:  no suspicious lesions,  warm and dry  Head :  Normocephalic  Neck/thyroid:  neck supple, full rom, no cervical lymphadenopathy  no thyromegaly  Heart:  RRR  no murmurs  Abdomen:  Normal , bs present, soft, nontender, not distended, no masses palpated  Extremities:  No clubbing, cyanosis, or edema  Neurologic:  Nonfocal  Psych: alert, normal mood    Problem List Items Addressed This Visit       Hypertension, essential - Primary    Relevant Medications    dilTIAZem CD (Cardizem CD) 120 mg 24 hr capsule    Other Relevant Orders    Hemoglobin A1C (Completed)    Comprehensive Metabolic Panel    TSH with reflex to Free T4 if abnormal (Completed)    Vitamin D 25-Hydroxy,Total (for eval of Vitamin D levels) (Completed)    CBC and Auto Differential (Completed)    Lipid Panel    Iron and TIBC (Completed)    Ferritin (Completed)    CT cardiac scoring wo IV contrast    Hyperlipidemia    Relevant Orders    Hemoglobin A1C (Completed)    Comprehensive Metabolic Panel    TSH with reflex to Free T4 if abnormal (Completed)    Vitamin D 25-Hydroxy,Total (for eval of Vitamin D levels) (Completed)    CBC and Auto Differential (Completed)    Lipid Panel    Iron and TIBC (Completed)    Ferritin (Completed)    Anxiety    Relevant Orders    Hemoglobin A1C (Completed)    Comprehensive Metabolic Panel    TSH with reflex to Free T4 if abnormal (Completed)    Vitamin D 25-Hydroxy,Total (for eval of Vitamin D levels) (Completed)    CBC and Auto Differential (Completed)    Lipid Panel    Iron and TIBC (Completed)     Ferritin (Completed)     Other Visit Diagnoses       Cough, unspecified type        Relevant Orders    XR chest 2 views (Completed)    Respiratory Allergy Profile IgE (Completed)    Food Allergy Profile IgE (Completed)    Colitis        Relevant Orders    Referral to Gastroenterology    Sedimentation Rate (Completed)            Jolly was seen today for follow-up.  Diagnoses and all orders for this visit:  Hypertension, essential (Primary)  -     Hemoglobin A1C; Future  -     Comprehensive Metabolic Panel; Future  -     TSH with reflex to Free T4 if abnormal; Future  -     Vitamin D 25-Hydroxy,Total (for eval of Vitamin D levels); Future  -     CBC and Auto Differential; Future  -     Lipid Panel; Future  -     Iron and TIBC; Future  -     Ferritin; Future  -     dilTIAZem CD (Cardizem CD) 120 mg 24 hr capsule; Take 1 capsule (120 mg) by mouth once daily.  -     CT cardiac scoring wo IV contrast; Future  -     CT cardiac scoring wo IV contrast  Mixed hyperlipidemia  -     Hemoglobin A1C; Future  -     Comprehensive Metabolic Panel; Future  -     TSH with reflex to Free T4 if abnormal; Future  -     Vitamin D 25-Hydroxy,Total (for eval of Vitamin D levels); Future  -     CBC and Auto Differential; Future  -     Lipid Panel; Future  -     Iron and TIBC; Future  -     Ferritin; Future  Anxiety  -     Hemoglobin A1C; Future  -     Comprehensive Metabolic Panel; Future  -     TSH with reflex to Free T4 if abnormal; Future  -     Vitamin D 25-Hydroxy,Total (for eval of Vitamin D levels); Future  -     CBC and Auto Differential; Future  -     Lipid Panel; Future  -     Iron and TIBC; Future  -     Ferritin; Future  Cough, unspecified type  -     XR chest 2 views; Future  -     Respiratory Allergy Profile IgE; Future  -     Food Allergy Profile IgE; Future  -     XR chest 2 views  Colitis  -     Referral to Gastroenterology; Future  -     Sedimentation Rate; Future  Likely will need to inc diltiazem  Check bp at home ,  call with numbers after one week   Stop ace inh due to cough   Most likely post viral cough   Follow up in 3 months or prn.

## 2023-09-14 LAB
ALLERGEN ANIMAL: CAT DANDER IGE (KU/L): 0.16 KU/L
ALLERGEN ANIMAL: DOG DANDER IGE (KU/L): 0.19 KU/L
ALLERGEN FOOD: CLAM (RUDITAPES SPP.) IGE (KU/L): <0.1 KU/L
ALLERGEN FOOD: EGG WHITE IGE (KU/L): <0.1 KU/L
ALLERGEN FOOD: FISH (COD) GADUS MORHUA) IGE (KU/L): <0.1 KU/L
ALLERGEN FOOD: MAIZE, CORN (ZEA MAYS) IGE (KU/L): <0.1 KU/L
ALLERGEN FOOD: MILK IGE (KU/L): <0.1 KU/L
ALLERGEN FOOD: PEANUT (ARACHIS HYPOGAEA) IGE (KU/L): 0.23 KU/L
ALLERGEN FOOD: SCALLOP (PECTEN SPP.) IGE (KU/L): <0.1 KU/L
ALLERGEN FOOD: SESAME SEED (SESAMUM INDICUM) IGE (KU/L): 0.23 KU/L
ALLERGEN FOOD: SHRIMP (P. BOREALIS/MONODON, M. BARBATA/JOYNERI) IGE (KU/L): <0.1 KU/L
ALLERGEN FOOD: SOYBEAN (GLYCINE MAX) IGE (KU/L): 0.14 KU/L
ALLERGEN FOOD: WALNUT (JUGLANS SPP.) IGE (KU/L): <0.1 KU/L
ALLERGEN FOOD: WHEAT (TRITICUM AESTIVUM) IGE (KU/L): 0.16 KU/L
ALLERGEN GRASS: BERMUDA GRASS (CYNODON DACTYLON) IGE (KU/L): 0.29 KU/L
ALLERGEN GRASS: JOHNSON GRASS (SORGHUM HALEPENSE) IGE (KU/L): 0.4 KU/L
ALLERGEN GRASS: MEADOW GRASS, KENTUCKY BLUE (POA PRATENSIS )IGE (KU/L): 1.91 KU/L
ALLERGEN GRASS: TIMOTHY GRASS (PHLEUM PRATENSE) IGE (KU/L): 1.54 KU/L
ALLERGEN INSECT: COCKROACH IGE: 0.1 KU/L
ALLERGEN MICROORGANISM: ALTERNARIA ALTERNATA IGE (KU/L): 0.77 KU/L
ALLERGEN MICROORGANISM: ASPERGILLUS FUMIGATUS IGE (KU/L): <0.1 KU/L
ALLERGEN MICROORGANISM: CLADOSPORIUM HERBARUM IGE (KU/L): <0.1 KU/L
ALLERGEN MICROORGANISM: PENICILLIUM CHRYSOGENUM (P. NOTATUM) IGE (KU/L): <0.1 KU/L
ALLERGEN MITE: DERMATOPHAGOIDES FARINAE (HOUSE DUST MITE) IGE (KU/L): 1.03 KU/L
ALLERGEN MITE: DERMATOPHAGOIDES PTERONYSSINUS (HOUSE DUST MITE) IGE (KU/L): 0.86 KU/L
ALLERGEN TREE: BOX-ELDER (ACER NEGUNDO) IGE (KU/L): 0.17 KU/L
ALLERGEN TREE: COMMON SILVER BIRCH (BETULA VERRUCOSA) IGE (KU/L): <0.1 KU/L
ALLERGEN TREE: COTTONWOOD (POPULUS DELTOIDES) IGE (KU/L): 0.19 KU/L
ALLERGEN TREE: ELM (ULMUS AMERICANA) IGE (KU/L): 0.17 KU/L
ALLERGEN TREE: MAPLE LEAF SYCAMORE, LONDON PLANE IGE (KU/L): 0.2 KU/L
ALLERGEN TREE: MOUNTAIN JUNIPER (JUNIPERUS SABINOIDES) IGE (KU/L): 0.14 KU/L
ALLERGEN TREE: MULBERRY (MORUS ALBA) IGE (KU/L): <0.1 KU/L
ALLERGEN TREE: OAK (QUERCUS ALBA) IGE (KU/L): 0.24 KU/L
ALLERGEN TREE: PECAN, HICKORY (CARYA PECAN) IGE (KU/L): 0.13 KU/L
ALLERGEN TREE: WALNUT IGE: 0.18 KU/L
ALLERGEN TREE: WHITE ASH (FRAXINUS AMERICANA) IGE (KU/L): 0.23 KU/L
ALLERGEN WEED: COMMON PIGWEED (AMARANTHUS RETROFLEXUS) IGE (KU/L): 0.16 KU/L
ALLERGEN WEED: COMMON RAGWEED (AMB. ARTEMISIIFOLIA/A. ELATIOR) IGE (KU/L): 0.45 KU/L
ALLERGEN WEED: GOOSEFOOT, LAMB'S QUARTERS (CHENOPODIUM ALBUM) IGE (KU/L): 0.19 KU/L
ALLERGEN WEED: PLANTAIN (ENGLISH), RIBWORT (PLANTAGO LANCEOLATA) IGE (KU/L): 0.13 KU/L
ALLERGEN WEED: PRICKLY SALTWORT/RUSSIAN THISTLE (SALSOLA KALI) IGE (KU/L): 0.24 KU/L
ALLERGEN WEED: SHEEP SORREL (RUMEX ACETOSELLA) IGE (KU/L): 0.21 KU/L
IMMUNOCAP IGE: 56.9 KU/L (ref 0–214)
IMMUNOCAP INTERPRETATION: ABNORMAL
IMMUNOCAP INTERPRETATION: NORMAL

## 2023-10-04 ENCOUNTER — OFFICE VISIT (OUTPATIENT)
Dept: GASTROENTEROLOGY | Facility: CLINIC | Age: 58
End: 2023-10-04
Payer: COMMERCIAL

## 2023-10-04 ENCOUNTER — ANCILLARY PROCEDURE (OUTPATIENT)
Dept: RADIOLOGY | Facility: CLINIC | Age: 58
End: 2023-10-04

## 2023-10-04 VITALS
HEIGHT: 62 IN | HEART RATE: 85 BPM | SYSTOLIC BLOOD PRESSURE: 131 MMHG | DIASTOLIC BLOOD PRESSURE: 76 MMHG | WEIGHT: 157 LBS | BODY MASS INDEX: 28.89 KG/M2

## 2023-10-04 DIAGNOSIS — I10 ESSENTIAL (PRIMARY) HYPERTENSION: ICD-10-CM

## 2023-10-04 DIAGNOSIS — K51.00 ULCERATIVE PANCOLITIS WITHOUT COMPLICATION (MULTI): Primary | ICD-10-CM

## 2023-10-04 PROCEDURE — 1036F TOBACCO NON-USER: CPT | Performed by: REGISTERED NURSE

## 2023-10-04 PROCEDURE — 75571 CT HRT W/O DYE W/CA TEST: CPT

## 2023-10-04 PROCEDURE — 99204 OFFICE O/P NEW MOD 45 MIN: CPT | Performed by: REGISTERED NURSE

## 2023-10-04 PROCEDURE — 3078F DIAST BP <80 MM HG: CPT | Performed by: REGISTERED NURSE

## 2023-10-04 PROCEDURE — 3075F SYST BP GE 130 - 139MM HG: CPT | Performed by: REGISTERED NURSE

## 2023-10-04 RX ORDER — LISINOPRIL 10 MG/1
10 TABLET ORAL DAILY
COMMUNITY
Start: 2023-09-13 | End: 2023-10-12 | Stop reason: ALTCHOICE

## 2023-10-04 ASSESSMENT — ENCOUNTER SYMPTOMS
BLOOD IN STOOL: 0
MYALGIAS: 0
COUGH: 1
ENDOCRINE NEGATIVE: 1
ABDOMINAL DISTENTION: 0
SHORTNESS OF BREATH: 1
ANAL BLEEDING: 0
DIARRHEA: 1
EYES NEGATIVE: 1
CONSTITUTIONAL NEGATIVE: 1
RECTAL PAIN: 0
VOMITING: 0
NAUSEA: 0
ABDOMINAL PAIN: 0
ARTHRALGIAS: 0
CONSTIPATION: 1
JOINT SWELLING: 0

## 2023-10-04 NOTE — PROGRESS NOTES
REASON FOR VISIT:   Here to establish as a new patient for Ulcerative Colitis. GI MD - [facility] - Dr Beebe - [facility] - [phone]    HPI:  Jolly Adame is a 58 y.o. female who presents for Ulcerative Colitis, diagnosed at age 20. Takes Apriso 4 daily.  Alternating diarrhea and constipation for years.   Denies hematochezia or melena.  Occasional mucus.     States that she occasionally takes WelChol if she has severe diarrhea and called her GI doctor if she had rectal bleeding.      Denies reflux, dysphagia, early satiety, abdominal bloating, nausea or vomiting.  Her weight and appetite are stable.  She is actively trying to lose weight.     States that if she is going into work she does not eat or drink much because she does not want to have diarrhea.  She tends to be more salads and fruit when she is working from home and then has more frequent loose stool throughout the day.    Denies NSAID use    Med -  Apriso 4 daily     Labs - 9/13/23 - CBC, CMP, Ferritin     PMH -  Ulcerative Colitis, HLD,   PSH - Cholecystectomy, hysterectomy   Fam Hx -  No fam h/o Colon CA, sister has Crohn's    Previous endoscopic eval: Colonoscopy - not sure when ??     REVIEW OF SYSTEMS    Review of Systems   Constitutional: Negative.    HENT:  Negative for mouth sores.    Eyes: Negative.    Respiratory:  Positive for cough and shortness of breath.    Gastrointestinal:  Positive for constipation and diarrhea. Negative for abdominal distention, abdominal pain, anal bleeding, blood in stool, nausea, rectal pain and vomiting.   Endocrine: Negative.    Genitourinary: Negative.    Musculoskeletal:  Negative for arthralgias, joint swelling and myalgias.   Skin: Negative.           Allergies   Allergen Reactions    Azithromycin Unknown    Bactrim [Sulfamethoxazole-Trimethoprim] Unknown    Bee Venom Protein (Honey Bee) Unknown    Erythromycin Unknown    Ibuprofen Unknown    Paxlovid (Eua) [Nirmatrelvir-Ritonavir]  Unknown       Past Medical History:   Diagnosis Date    Abnormal MRI, shoulder 10/29/2022    Nondisplaced greater tuberosity left proximal humeral fracture.  Multiple foci of rotator cuff calcific tendinitis.  Small amount of bursal surface fraying of the distal supraspinatus tendon without evidence of full-thickness rotator cuff tear       Family History   Problem Relation Name Age of Onset    Stroke Mother      Dementia Mother      Heart attack Mother      Diabetes Sister      Crohn's disease Sister      Irritable bowel syndrome Sister      Cancer Daughter      Colon cancer Maternal Grandfather      Breast cancer Other Grandmother        Social History     Tobacco Use    Smoking status: Never    Smokeless tobacco: Never   Substance Use Topics    Alcohol use: Yes       Current Outpatient Medications   Medication Sig Dispense Refill    lisinopril 10 mg tablet Take 1 tablet (10 mg) by mouth once daily.      ascorbic acid (Vitamin C) 500 mg tablet Take 1 tablet (500 mg) by mouth.      busPIRone (Buspar) 15 mg tablet Take 0.5 tablets (7.5 mg) by mouth once daily.      cetirizine (ZyrTEC) 10 mg tablet Take 1 tablet (10 mg) by mouth.      cholecalciferol (Vitamin D-3) 50 mcg (2,000 unit) capsule Take by mouth once daily.      colesevelam (Welchol) 625 mg tablet Take 1 tablet (625 mg) by mouth. Take with meal(s) and a liquid.      dilTIAZem CD (Cardizem CD) 120 mg 24 hr capsule Take 1 capsule (120 mg) by mouth once daily. 30 capsule 5    docosahexaenoic acid/epa (FISH OIL ORAL) Take 2 capsules by mouth once daily.      EPINEPHrine (EpiPen) 0.3 mg/0.3 mL injection syringe Inject as directed if needed. Inject into upper leg. Call 911 after use.      melatonin 10 mg tablet Take 1 tablet (10 mg) by mouth once daily at bedtime.      mesalamine ER (Apriso) 0.375 gram 24 hr capsule Take 4 capsules (1.5 g) by mouth once daily. Do not crush or chew. 360 capsule 3    metoprolol succinate XL (Toprol-XL) 25 mg 24 hr tablet Take 1  "tablet (25 mg) by mouth in the morning and 1 tablet (25 mg) before bedtime. Do not crush or chew.. 60 tablet 11    montelukast (Singulair) 10 mg tablet Take 1 tablet (10 mg) by mouth once daily.      multivit-min/ferrous fumarate (MULTI VITAMIN ORAL) Take by mouth.      nortriptyline (Pamelor) 50 mg capsule 2 tablets nightly 60 capsule 11    omeprazole OTC (PriLOSEC OTC) 20 mg EC tablet Take 1 tablet (20 mg) by mouth. Do not crush, chew, or split.      simvastatin (Zocor) 20 mg tablet Take 1 tablet (20 mg) by mouth once daily at bedtime. 30 tablet 11     No current facility-administered medications for this visit.       PHYSICAL EXAM:  /76   Pulse 85   Ht 1.575 m (5' 2\")   Wt 71.2 kg (157 lb)   BMI 28.72 kg/m²      Physical Exam  Constitutional:       Appearance: Normal appearance.   Cardiovascular:      Rate and Rhythm: Normal rate and regular rhythm.   Pulmonary:      Effort: Pulmonary effort is normal.      Breath sounds: Normal breath sounds.   Abdominal:      General: Abdomen is flat. Bowel sounds are normal. There is no distension.      Palpations: Abdomen is soft. There is no mass.      Tenderness: There is no abdominal tenderness. There is no guarding or rebound.      Hernia: No hernia is present.   Musculoskeletal:         General: Normal range of motion.      Cervical back: Normal range of motion and neck supple.   Skin:     General: Skin is warm and dry.   Neurological:      Mental Status: She is alert and oriented to person, place, and time.   Psychiatric:         Mood and Affect: Mood normal.         Behavior: Behavior normal.          ASSESSMENT    Here to establish as a new patient with ulcerative colitis.  His colonoscopy was over 2 years ago.    PLAN    Here to establish as a new patient with ulcerative colitis.  Office will send in a refill for Apriso-you take 4 daily.  Sent in for 90-day supply with 3 refills to your pharmacy  Obtain medical release form from your doctor in [place] " [place]  Schedule colonoscopy.  We will send in for a bowel prep for you.  Please follow the prep instructions closely.  You will need a .  Anesthesia professional be with you during the procedure to keep you comfortable  Follow-up in the GI clinic in 6 months to 1 year, sooner if needed.  Call [phone] to schedule an appointment or if you have any questions or concerns              Date: 10/4/2023  Time: 3:20 PM

## 2023-10-06 DIAGNOSIS — R93.1 ABNORMAL SCREENING CARDIAC CT: ICD-10-CM

## 2023-10-12 ENCOUNTER — OFFICE VISIT (OUTPATIENT)
Dept: CARDIOLOGY | Facility: CLINIC | Age: 58
End: 2023-10-12
Payer: COMMERCIAL

## 2023-10-12 VITALS
DIASTOLIC BLOOD PRESSURE: 80 MMHG | HEIGHT: 62 IN | BODY MASS INDEX: 29.08 KG/M2 | WEIGHT: 158 LBS | HEART RATE: 80 BPM | TEMPERATURE: 97.5 F | SYSTOLIC BLOOD PRESSURE: 120 MMHG

## 2023-10-12 DIAGNOSIS — I10 HYPERTENSION, ESSENTIAL: Primary | ICD-10-CM

## 2023-10-12 DIAGNOSIS — R93.1 ELEVATED CORONARY ARTERY CALCIUM SCORE: ICD-10-CM

## 2023-10-12 PROCEDURE — 99204 OFFICE O/P NEW MOD 45 MIN: CPT | Performed by: INTERNAL MEDICINE

## 2023-10-12 PROCEDURE — 1036F TOBACCO NON-USER: CPT | Performed by: INTERNAL MEDICINE

## 2023-10-12 PROCEDURE — 3079F DIAST BP 80-89 MM HG: CPT | Performed by: INTERNAL MEDICINE

## 2023-10-12 PROCEDURE — 3074F SYST BP LT 130 MM HG: CPT | Performed by: INTERNAL MEDICINE

## 2023-10-12 NOTE — PROGRESS NOTES
Referred by Dr. Davidson ref. provider found for new patient to discuss CT score     History Of Present Illness:    Jolly Adame is a 58 y.o. female presenting with abnormal calcium score, patient is a pleasant 58-year-old female who had presented to the primary care physician with complaint episode of chest discomfort under stress this event happened about a month ago she states that she has pressure sensation retrosternal that lasted about 30 minutes with spontaneous resolution, in this setting she underwent a calcium score that proved to be mildly elevated at 121, she described herself as active she has no complaints of exertional chest pain, she denies shortness of breath orthopnea PND or syncopal episode.      Past Medical History:  She has a past medical history of Abnormal MRI, shoulder (10/29/2022).  Hypertension  Hyperlipidemia  Anxiety  Colitis  Past Surgical History:  Hysterectomy   Cholecystectomy       Social History:  She reports that she does not have a smoking history on file. She has never been exposed to tobacco smoke. She has never used smokeless tobacco. She reports current alcohol use. She reports that she does not use drugs.  She is  lives at home mother of 2 children she has no history of smoking she consumes 2-3 alcoholic beverages per week and has had 2 years of college education  Family History:  Father  70 years of age with history of CAD/CABG his first coronary event at the age of 54  Mother  70 years of age Alzheimer's and and heart disease of unclear etiology  Family History   Problem Relation Name Age of Onset    Stroke Mother      Dementia Mother      Heart attack Mother      Diabetes Sister      Crohn's disease Sister      Irritable bowel syndrome Sister      Cancer Daughter      Colon cancer Maternal Grandfather      Breast cancer Other Grandmother         Allergies:  Bactrim [sulfamethoxazole-trimethoprim], Bee venom protein (honey bee), Erythromycin,  "Ibuprofen, Paxlovid (eua) [nirmatrelvir-ritonavir], and Azithromycin    Outpatient Medications:  Current Outpatient Medications   Medication Instructions    ascorbic acid (VITAMIN C) 500 mg, oral    busPIRone (Buspar) 15 mg tablet 0.5 tablets, oral, Daily    cetirizine (ZYRTEC) 10 mg, oral    cholecalciferol (Vitamin D-3) 50 mcg (2,000 unit) capsule oral, Daily    colesevelam (WELCHOL) 625 mg, oral, Take with meal(s) and a liquid.    dilTIAZem CD (CARDIZEM CD) 120 mg, oral, Daily    EPINEPHrine (EpiPen) 0.3 mg/0.3 mL injection syringe injection, As needed, Inject into upper leg. Call 911 after use.    melatonin 10 mg tablet 1 tablet, oral, Nightly    mesalamine ER (APRISO) 1.5 g, oral, Daily, Do not crush or chew.    metoprolol succinate XL (TOPROL-XL) 25 mg, oral, 2 times daily, Do not crush or chew.    montelukast (Singulair) 10 mg tablet 1 tablet, oral, Daily    multivit-min/ferrous fumarate (MULTI VITAMIN ORAL) oral    nortriptyline (Pamelor) 50 mg capsule 2 tablets nightly     omeprazole OTC (PRILOSEC OTC) 20 mg, oral, Do not crush, chew, or split.     simvastatin (ZOCOR) 20 mg, oral, Nightly        Last Recorded Vitals:  Vitals:    10/12/23 1329   BP: 120/80   BP Location: Left arm   Patient Position: Sitting   Pulse: 80   Temp: 36.4 °C (97.5 °F)   Weight: 71.7 kg (158 lb)   Height: 1.575 m (5' 2\")       Physical Exam:  Patient is a well-developed well-nourished female accompanied by her  skin is warm and dry no carotid bruits upstroke and volumes are normal heart rate and rhythm are regular S1-S2 are normal intensity no gallop or murmurs, lungs clear to auscultation normal air entry abdomen is mildly distended positive bowel sounds soft and nontender       Last Labs:  CBC -  Lab Results   Component Value Date    WBC 7.0 09/13/2023    HGB 13.2 09/13/2023    HCT 40.9 09/13/2023    MCV 92 09/13/2023     09/13/2023       CMP -  Lab Results   Component Value Date    CALCIUM 9.7 09/13/2023    PROT 6.8 " "09/13/2023    ALBUMIN 4.6 09/13/2023    AST 18 09/13/2023    ALT 27 09/13/2023    ALKPHOS 71 09/13/2023    BILITOT 0.4 09/13/2023       LIPID PANEL -   Lab Results   Component Value Date    CHOL 146 09/13/2023    TRIG 192 (H) 09/13/2023    HDL 22.8 (A) 09/13/2023    CHHDL 6.4 (A) 09/13/2023    LDLF 85 09/13/2023    VLDL 38 09/13/2023    NHDL 231 04/06/2023       RENAL FUNCTION PANEL -   Lab Results   Component Value Date    GLUCOSE 112 (H) 09/13/2023     09/13/2023    K 4.4 09/13/2023     09/13/2023    CO2 30 09/13/2023    ANIONGAP 12 09/13/2023    BUN 10 09/13/2023    CREATININE 0.85 09/13/2023    CALCIUM 9.7 09/13/2023    ALBUMIN 4.6 09/13/2023        Lab Results   Component Value Date    HGBA1C 5.9 (A) 09/13/2023       Last Cardiology Tests:  ECG:  No results found for this or any previous visit from the past 1095 days.    Twelve-lead EKG February 24, 2023 shows normal sinus rhythm ventricular rate of 92 bpm and normal record  Echo:  No results found for this or any previous visit from the past 1095 days.      Ejection Fractions:  No results found for: \"EF\"    Cath:  No results found for this or any previous visit from the past 1095 days.      Stress Test:  No results found for this or any previous visit from the past 1095 days.      Cardiac Imaging:  CT cardiac scoring wo IV contrast 10/6/2023    Left main 34, LAD 87, circumflex 0, RCA 0 total calcium count of 121        Assessment/Plan   58-year-old female with the above-noted history of chest discomfort 1 event while under stress and mildly abnormal calcium score total score of 121 further evaluation with graded exercise test is recommended patient EKG is normal tracing patient is recommended to have a graded treadmill test patient is to return to my office after the above tests is completed Case was discussed with the patient patient states that she understand and agrees to the procedure        Shashank Pickard MD  "

## 2023-10-19 ENCOUNTER — APPOINTMENT (OUTPATIENT)
Dept: CARDIOLOGY | Facility: CLINIC | Age: 58
End: 2023-10-19
Payer: COMMERCIAL

## 2023-10-20 ENCOUNTER — APPOINTMENT (OUTPATIENT)
Dept: GASTROENTEROLOGY | Facility: EXTERNAL LOCATION | Age: 58
End: 2023-10-20
Payer: COMMERCIAL

## 2023-10-23 ENCOUNTER — TELEPHONE (OUTPATIENT)
Dept: GASTROENTEROLOGY | Facility: CLINIC | Age: 58
End: 2023-10-23
Payer: COMMERCIAL

## 2023-10-23 NOTE — TELEPHONE ENCOUNTER
Can you please reach out to this patient? She states that on 10/4 her and Roxana discussed some options to help with constipation.     Thanks!

## 2023-10-26 ENCOUNTER — HOSPITAL ENCOUNTER (OUTPATIENT)
Dept: CARDIOLOGY | Facility: CLINIC | Age: 58
Discharge: HOME | End: 2023-10-26
Payer: COMMERCIAL

## 2023-10-26 ENCOUNTER — APPOINTMENT (OUTPATIENT)
Dept: CARDIOLOGY | Facility: CLINIC | Age: 58
End: 2023-10-26
Payer: COMMERCIAL

## 2023-10-26 DIAGNOSIS — I10 HYPERTENSION, ESSENTIAL: ICD-10-CM

## 2023-10-26 DIAGNOSIS — R93.1 ELEVATED CORONARY ARTERY CALCIUM SCORE: ICD-10-CM

## 2023-10-26 PROCEDURE — 93018 CV STRESS TEST I&R ONLY: CPT | Performed by: INTERNAL MEDICINE

## 2023-10-26 PROCEDURE — 93017 CV STRESS TEST TRACING ONLY: CPT

## 2023-10-26 PROCEDURE — 93016 CV STRESS TEST SUPVJ ONLY: CPT | Performed by: INTERNAL MEDICINE

## 2023-11-01 ENCOUNTER — APPOINTMENT (OUTPATIENT)
Dept: CARDIOLOGY | Facility: CLINIC | Age: 58
End: 2023-11-01
Payer: COMMERCIAL

## 2023-11-07 ENCOUNTER — NURSE TRIAGE (OUTPATIENT)
Dept: PRIMARY CARE | Facility: CLINIC | Age: 58
End: 2023-11-07
Payer: COMMERCIAL

## 2023-11-08 ENCOUNTER — APPOINTMENT (OUTPATIENT)
Dept: CARDIOLOGY | Facility: CLINIC | Age: 58
End: 2023-11-08
Payer: COMMERCIAL

## 2023-11-15 ENCOUNTER — OFFICE VISIT (OUTPATIENT)
Dept: CARDIOLOGY | Facility: CLINIC | Age: 58
End: 2023-11-15
Payer: COMMERCIAL

## 2023-11-15 VITALS
DIASTOLIC BLOOD PRESSURE: 76 MMHG | HEIGHT: 63 IN | BODY MASS INDEX: 27.64 KG/M2 | SYSTOLIC BLOOD PRESSURE: 118 MMHG | TEMPERATURE: 97.2 F | WEIGHT: 156 LBS | HEART RATE: 84 BPM

## 2023-11-15 DIAGNOSIS — E78.2 MIXED HYPERLIPIDEMIA: ICD-10-CM

## 2023-11-15 DIAGNOSIS — I25.10 CORONARY ARTERY DISEASE INVOLVING NATIVE CORONARY ARTERY OF NATIVE HEART WITHOUT ANGINA PECTORIS: ICD-10-CM

## 2023-11-15 DIAGNOSIS — I10 HYPERTENSION, ESSENTIAL: Primary | ICD-10-CM

## 2023-11-15 PROCEDURE — 1036F TOBACCO NON-USER: CPT | Performed by: INTERNAL MEDICINE

## 2023-11-15 PROCEDURE — 99214 OFFICE O/P EST MOD 30 MIN: CPT | Performed by: INTERNAL MEDICINE

## 2023-11-15 PROCEDURE — 3074F SYST BP LT 130 MM HG: CPT | Performed by: INTERNAL MEDICINE

## 2023-11-15 PROCEDURE — 3078F DIAST BP <80 MM HG: CPT | Performed by: INTERNAL MEDICINE

## 2023-11-15 ASSESSMENT — PAIN SCALES - GENERAL: PAINLEVEL: 0-NO PAIN

## 2023-11-15 NOTE — PROGRESS NOTES
She has a past medical history of Abnormal MRI, shoulder (10/29/2022).  Hypertension  Hyperlipidemia  Anxiety  Colitis    Patient is a pleasant 58-year-old female with the above-noted pertinent past medical history who presents today for follow-up of her stress testing, during the evaluation incidental finding of increased coronary calcium score of 121 was discovered to for which she completed stress testing and returns today for review of the test results.  She has no complaints of exertional chest pain or shortness of breath she states that she has a started regimen walking with her  and intends to continue, she also has made some dietary changes, she has no complaints of orthopnea PND palpitation or syncopal episode    Vital signs reviewed and stable BMI of 27.6, heart rate 84 bpm, blood pressure 118/76 mmHg  Well-developed well-nourished female in no acute distress speaking full sentences no carotid bruits heart rate and rhythm are regular S1 and S2 are normal no gallop or murmurs, abdomen is moderately distended positive bowel sounds soft nontender    Graded exercise test with the peak heart rate of 129 bpm which was 79% age-predicted target heart rate blood pressure with normal incrementation peak blood pressure of 152/78 mmHg she completed 10 METS test was terminated due to fatigue there was no complaints of chest pain.    Elevated calcium score with negative stress testing this is a symptom limited stress test at 79% of age-predicted with normal functional capacity of greater than 10 METS, test was terminated due to fatigue there was no chest pain or EKG changes signifying ischemia, risk factor modification was extensively discussed with the patient recommendation for heart healthy diet was made maintaining normal BMI 25 was discussed daily activity with a goal of 150 minutes of moderate exercise per week was discussed and recommendations were made details were provided, patient may return to my  clinic as needed.

## 2023-12-07 ENCOUNTER — HOSPITAL ENCOUNTER (OUTPATIENT)
Dept: GASTROENTEROLOGY | Facility: EXTERNAL LOCATION | Age: 58
Discharge: HOME | End: 2023-12-07
Payer: COMMERCIAL

## 2023-12-07 VITALS
RESPIRATION RATE: 13 BRPM | SYSTOLIC BLOOD PRESSURE: 119 MMHG | DIASTOLIC BLOOD PRESSURE: 66 MMHG | TEMPERATURE: 96.8 F | OXYGEN SATURATION: 98 % | HEART RATE: 76 BPM

## 2023-12-07 DIAGNOSIS — K51.00 ULCERATIVE PANCOLITIS WITHOUT COMPLICATION (MULTI): ICD-10-CM

## 2023-12-07 PROCEDURE — 88305 TISSUE EXAM BY PATHOLOGIST: CPT | Performed by: STUDENT IN AN ORGANIZED HEALTH CARE EDUCATION/TRAINING PROGRAM

## 2023-12-07 PROCEDURE — 88305 TISSUE EXAM BY PATHOLOGIST: CPT

## 2023-12-07 PROCEDURE — 45380 COLONOSCOPY AND BIOPSY: CPT | Performed by: STUDENT IN AN ORGANIZED HEALTH CARE EDUCATION/TRAINING PROGRAM

## 2023-12-07 RX ORDER — SODIUM CHLORIDE 9 MG/ML
20 INJECTION, SOLUTION INTRAVENOUS CONTINUOUS
Status: DISCONTINUED | OUTPATIENT
Start: 2023-12-07 | End: 2023-12-08 | Stop reason: HOSPADM

## 2023-12-07 ASSESSMENT — COLUMBIA-SUICIDE SEVERITY RATING SCALE - C-SSRS
1. IN THE PAST MONTH, HAVE YOU WISHED YOU WERE DEAD OR WISHED YOU COULD GO TO SLEEP AND NOT WAKE UP?: NO
2. HAVE YOU ACTUALLY HAD ANY THOUGHTS OF KILLING YOURSELF?: NO
6. HAVE YOU EVER DONE ANYTHING, STARTED TO DO ANYTHING, OR PREPARED TO DO ANYTHING TO END YOUR LIFE?: NO

## 2023-12-07 ASSESSMENT — PAIN - FUNCTIONAL ASSESSMENT
PAIN_FUNCTIONAL_ASSESSMENT: 0-10

## 2023-12-07 ASSESSMENT — PAIN SCALES - GENERAL
PAINLEVEL_OUTOF10: 0 - NO PAIN

## 2023-12-07 NOTE — DISCHARGE INSTRUCTIONS
Patient Instructions Post Procedure      The anesthetics, sedatives or narcotics which were given to you today will be acting in your body for the next 24 hours, so you might feel a little sleepy or groggy.  This feeling should slowly wear off. Carefully read and follow the instructions.     You received sedation today:  - Do not drive or operate any machinery or power tools of any kind.   - No alcoholic beverages today, not even beer or wine.  - Do not make any important decisions or sign any legal documents.  - No over the counter medications that contain alcohol or that may cause drowsiness.    While it is common to experience mild to moderate abdominal distention, gas, or belching after your procedure, if any of these symptoms occur following discharge from the GI Lab or within one week of having your procedure, call the [facility] to be advised whether a visit to your nearest Urgent Care or Emergency Department is indicated.  Take this paper with you if you go.   - If you develop an allergic reaction to the medications that were given during your procedure such as difficulty breathing, rash, hives, severe nausea, vomiting or lightheadedness.  - If you experience chest pain, shortness of breath, severe abdominal pain, fevers and chills.  -If you develop signs and symptoms of bleeding such as blood in your spit, if your stools turn black, tarry, or bloody  - If you have not urinated within 8 hours following your procedure.  - If your IV site becomes painful, red, inflamed, or looks infected.    If you received a biopsy/polypectomy/sphincterotomy the following instructions apply below:  __ Do not use Aspirin containing products, non-steroidal medications or anti-coagulants for one week following your procedure. (Examples of these types of medications are: Advil, Arthrotec, Aleve, Coumadin, Ecotrin, Heparin, Ibuprofen, Indocin, Motrin, Naprosyn, Nuprin, Plavix, Vioxx, and Voltarin, or their generic  forms.  This list is not all-inclusive.  Check with your physician or pharmacist before resuming medications.)   __ Eat a soft diet today.  Avoid foods that are poorly digested for the next 24 hours.  These foods would include: nuts, beans, lettuce, red meats, and fried foods. Start with liquids and advance your diet as tolerated, gradually work up to eating solids.   __ Do not have a Barium Study or Enema for one week.    Your physician recommends the additional following instructions:    -You have a contact number available for emergencies. The signs and symptoms of potential delayed complications were discussed with you. You may return to normal activities tomorrow.  -Resume your previous diet or other if specified.  -Continue your present medications.   -We are waiting for your pathology results, if applicable.  -The findings and recommendations have been discussed with you and/or family.  - Please see Medication Reconciliation Form for new medication/medications prescribed.     If you experience any problems or have any questions following discharge from the GI Lab, please call: [phone] from 7 am- 4:30 pm.  In the event of an emergency please go to the closest Emergency Department or call Dr. Giron [phone]

## 2023-12-07 NOTE — Clinical Note
Patient tolerated the procedure well and is comfortable with no complaints of pain.  Abdomen soft and non distended. Vital signs stable. Arousable prior to transport. Patient transported to PACU via stretcher. Handoff completed.

## 2023-12-07 NOTE — H&P
Outpatient Hospital Procedure H&P    Patient Profile  Name Jolly Adame  Date of Birth 1965  MRN [id number]  PCP Rozina Raza        Diagnosis: Follow up of UC.  Procedure(s):  Colonoscopy.    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  Multiple foci of rotator cuff calcific tendinitis.  Small amount of bursal surface fraying of the distal supraspinatus tendon without evidence of full-thickness rotator cuff tear    Anxiety     Depression     GERD (gastroesophageal reflux disease)     Hyperlipidemia     Hypertension     Seasonal allergies        Medication Reviewed - yes  Prior to Admission medications    Medication Sig Start Date End Date Taking? Authorizing Provider   ascorbic acid (Vitamin C) 500 mg tablet Take 1 tablet (500 mg) by mouth.    Historical Provider, MD   busPIRone (Buspar) 15 mg tablet Take 0.5 tablets (7.5 mg) by mouth once daily.    Historical Provider, MD   cetirizine (ZyrTEC) 10 mg tablet Take 1 tablet (10 mg) by mouth.    Historical Provider, MD   cholecalciferol (Vitamin D-3) 50 mcg (2,000 unit) capsule Take by mouth once daily.    Historical Provider, MD   colesevelam (Welchol) 625 mg tablet Take 1 tablet (625 mg) by mouth. Take with meal(s) and a liquid.    Historical Provider, MD   dilTIAZem CD (Cardizem CD) 120 mg 24 hr capsule Take 1 capsule (120 mg) by mouth once daily. 9/13/23 3/11/24  Rozina Raza MD   EPINEPHrine (EpiPen) 0.3 mg/0.3 mL injection syringe Inject as directed if needed. Inject into upper leg. Call 911 after use.    Historical Provider, MD   melatonin 10 mg  tablet Take 1 tablet (10 mg) by mouth once daily at bedtime.    Historical Provider, MD   mesalamine ER (Apriso) 0.375 gram 24 hr capsule Take 4 capsules (1.5 g) by mouth once daily. Do not crush or chew. 9/8/23 9/7/24  CROW Jimenes-CNP   metoprolol succinate XL (Toprol-XL) 25 mg 24 hr tablet Take 1 tablet (25 mg) by mouth in the morning and 1 tablet (25 mg) before bedtime. Do not crush or chew.. 4/19/23 4/18/24  Rozina Raza MD   montelukast (Singulair) 10 mg tablet Take 1 tablet (10 mg) by mouth once daily.    Historical Provider, MD   multivit-min/ferrous fumarate (MULTI VITAMIN ORAL) Take by mouth.    Historical Provider, MD   nortriptyline (Pamelor) 50 mg capsule 2 tablets nightly 7/24/23   CROW Adams-CNP   omeprazole OTC (PriLOSEC OTC) 20 mg EC tablet Take 1 tablet (20 mg) by mouth. Do not crush, chew, or split.    Historical Provider, MD   simvastatin (Zocor) 20 mg tablet Take 1 tablet (20 mg) by mouth once daily at bedtime. 4/10/23 4/9/24  Rozina Raza MD       Physical Exam  Vitals:    12/07/23 0727   BP: 142/77   Pulse: 90   Resp: 18   Temp: 36.5 °C (97.7 °F)   SpO2: 97%       General: A&Ox3, NAD.  HEENT: AT/NC.   CV: RRR. No murmur.  Resp: CTA bilaterally. No wheezing, rhonchi or rales.   GI: Soft, NT/ND. BSx4.  Extrem: No edema. Pulses intact.  Skin: No Jaundice.   Neuro: No focal deficits.   Psych: Normal mood and affect.        Oropharyngeal Classification IV.   ASA PS Classification 2  Sedation Plan: Deep Sedation.  Procedure Plan - pre-procedural (re)assesment completed by physician:  discharge/transfer patient when discharge criteria met    Irwin Giron DO  12/7/2023 7:28 AM

## 2023-12-13 LAB
LABORATORY COMMENT REPORT: NORMAL
PATH REPORT.FINAL DX SPEC: NORMAL
PATH REPORT.GROSS SPEC: NORMAL
PATH REPORT.TOTAL CANCER: NORMAL

## 2023-12-15 ENCOUNTER — APPOINTMENT (OUTPATIENT)
Dept: PRIMARY CARE | Facility: CLINIC | Age: 58
End: 2023-12-15
Payer: COMMERCIAL

## 2024-02-02 ENCOUNTER — APPOINTMENT (OUTPATIENT)
Dept: PRIMARY CARE | Facility: CLINIC | Age: 59
End: 2024-02-02
Payer: COMMERCIAL

## 2024-02-09 ENCOUNTER — TELEPHONE (OUTPATIENT)
Dept: PRIMARY CARE | Facility: CLINIC | Age: 59
End: 2024-02-09
Payer: COMMERCIAL

## 2024-02-09 DIAGNOSIS — Z00.00 ROUTINE ADULT HEALTH MAINTENANCE: ICD-10-CM

## 2024-02-09 NOTE — TELEPHONE ENCOUNTER
Pt came to husbands appt today. She is asking if blood work orders can be sent prior to her appt on 02/16.  Please advise.

## 2024-02-14 ENCOUNTER — LAB (OUTPATIENT)
Dept: LAB | Facility: LAB | Age: 59
End: 2024-02-14
Payer: COMMERCIAL

## 2024-02-14 DIAGNOSIS — Z00.00 ROUTINE ADULT HEALTH MAINTENANCE: ICD-10-CM

## 2024-02-14 DIAGNOSIS — R10.9 ABDOMINAL PAIN, UNSPECIFIED ABDOMINAL LOCATION: ICD-10-CM

## 2024-02-14 LAB
25(OH)D3 SERPL-MCNC: 44 NG/ML (ref 30–100)
ALBUMIN SERPL BCP-MCNC: 4.3 G/DL (ref 3.4–5)
ALP SERPL-CCNC: 71 U/L (ref 33–110)
ALT SERPL W P-5'-P-CCNC: 58 U/L (ref 7–45)
ANION GAP SERPL CALC-SCNC: 10 MMOL/L (ref 10–20)
AST SERPL W P-5'-P-CCNC: 37 U/L (ref 9–39)
BASOPHILS # BLD AUTO: 0.05 X10*3/UL (ref 0–0.1)
BASOPHILS NFR BLD AUTO: 0.7 %
BILIRUB SERPL-MCNC: 0.5 MG/DL (ref 0–1.2)
BUN SERPL-MCNC: 16 MG/DL (ref 6–23)
CALCIUM SERPL-MCNC: 9.2 MG/DL (ref 8.6–10.3)
CHLORIDE SERPL-SCNC: 103 MMOL/L (ref 98–107)
CHOLEST SERPL-MCNC: 126 MG/DL (ref 0–199)
CHOLESTEROL/HDL RATIO: 6
CO2 SERPL-SCNC: 31 MMOL/L (ref 21–32)
CREAT SERPL-MCNC: 0.79 MG/DL (ref 0.5–1.05)
EGFRCR SERPLBLD CKD-EPI 2021: 87 ML/MIN/1.73M*2
EOSINOPHIL # BLD AUTO: 0.3 X10*3/UL (ref 0–0.7)
EOSINOPHIL NFR BLD AUTO: 4.3 %
ERYTHROCYTE [DISTWIDTH] IN BLOOD BY AUTOMATED COUNT: 12.5 % (ref 11.5–14.5)
EST. AVERAGE GLUCOSE BLD GHB EST-MCNC: 126 MG/DL
GLUCOSE SERPL-MCNC: 121 MG/DL (ref 74–99)
HBA1C MFR BLD: 6 %
HCT VFR BLD AUTO: 40.2 % (ref 36–46)
HDLC SERPL-MCNC: 20.9 MG/DL
HGB BLD-MCNC: 12.9 G/DL (ref 12–16)
IMM GRANULOCYTES # BLD AUTO: 0.02 X10*3/UL (ref 0–0.7)
IMM GRANULOCYTES NFR BLD AUTO: 0.3 % (ref 0–0.9)
LDLC SERPL CALC-MCNC: 66 MG/DL
LYMPHOCYTES # BLD AUTO: 2.68 X10*3/UL (ref 1.2–4.8)
LYMPHOCYTES NFR BLD AUTO: 38.5 %
MCH RBC QN AUTO: 29.8 PG (ref 26–34)
MCHC RBC AUTO-ENTMCNC: 32.1 G/DL (ref 32–36)
MCV RBC AUTO: 93 FL (ref 80–100)
MONOCYTES # BLD AUTO: 0.52 X10*3/UL (ref 0.1–1)
MONOCYTES NFR BLD AUTO: 7.5 %
NEUTROPHILS # BLD AUTO: 3.39 X10*3/UL (ref 1.2–7.7)
NEUTROPHILS NFR BLD AUTO: 48.7 %
NON HDL CHOLESTEROL: 105 MG/DL (ref 0–149)
NRBC BLD-RTO: 0 /100 WBCS (ref 0–0)
PLATELET # BLD AUTO: 317 X10*3/UL (ref 150–450)
POTASSIUM SERPL-SCNC: 4.4 MMOL/L (ref 3.5–5.3)
PROT SERPL-MCNC: 6.6 G/DL (ref 6.4–8.2)
RBC # BLD AUTO: 4.33 X10*6/UL (ref 4–5.2)
SODIUM SERPL-SCNC: 140 MMOL/L (ref 136–145)
TRIGL SERPL-MCNC: 195 MG/DL (ref 0–149)
TSH SERPL-ACNC: 1.78 MIU/L (ref 0.44–3.98)
VIT B12 SERPL-MCNC: 663 PG/ML (ref 211–911)
VLDL: 39 MG/DL (ref 0–40)
WBC # BLD AUTO: 7 X10*3/UL (ref 4.4–11.3)

## 2024-02-14 PROCEDURE — 84443 ASSAY THYROID STIM HORMONE: CPT

## 2024-02-14 PROCEDURE — 36415 COLL VENOUS BLD VENIPUNCTURE: CPT

## 2024-02-14 PROCEDURE — 85025 COMPLETE CBC W/AUTO DIFF WBC: CPT

## 2024-02-14 PROCEDURE — 80053 COMPREHEN METABOLIC PANEL: CPT

## 2024-02-14 PROCEDURE — 82306 VITAMIN D 25 HYDROXY: CPT

## 2024-02-14 PROCEDURE — 83690 ASSAY OF LIPASE: CPT

## 2024-02-14 PROCEDURE — 82607 VITAMIN B-12: CPT

## 2024-02-14 PROCEDURE — 83036 HEMOGLOBIN GLYCOSYLATED A1C: CPT

## 2024-02-14 PROCEDURE — 80061 LIPID PANEL: CPT

## 2024-02-16 ENCOUNTER — OFFICE VISIT (OUTPATIENT)
Dept: PRIMARY CARE | Facility: CLINIC | Age: 59
End: 2024-02-16
Payer: COMMERCIAL

## 2024-02-16 ENCOUNTER — HOSPITAL ENCOUNTER (OUTPATIENT)
Dept: RADIOLOGY | Facility: CLINIC | Age: 59
Discharge: HOME | End: 2024-02-16
Payer: COMMERCIAL

## 2024-02-16 VITALS
HEIGHT: 63 IN | DIASTOLIC BLOOD PRESSURE: 76 MMHG | SYSTOLIC BLOOD PRESSURE: 139 MMHG | TEMPERATURE: 97.5 F | BODY MASS INDEX: 29.09 KG/M2 | HEART RATE: 94 BPM | OXYGEN SATURATION: 98 % | WEIGHT: 164.2 LBS

## 2024-02-16 DIAGNOSIS — L30.9 DERMATITIS: ICD-10-CM

## 2024-02-16 DIAGNOSIS — R10.9 ABDOMINAL PAIN, UNSPECIFIED ABDOMINAL LOCATION: ICD-10-CM

## 2024-02-16 DIAGNOSIS — K51.00 ULCERATIVE PANCOLITIS WITHOUT COMPLICATION (MULTI): ICD-10-CM

## 2024-02-16 DIAGNOSIS — L30.9 ECZEMA, UNSPECIFIED TYPE: ICD-10-CM

## 2024-02-16 DIAGNOSIS — R07.81 RIB PAIN: ICD-10-CM

## 2024-02-16 DIAGNOSIS — R06.83 SNORING: ICD-10-CM

## 2024-02-16 DIAGNOSIS — R74.8 ELEVATED LIVER ENZYMES: Primary | ICD-10-CM

## 2024-02-16 DIAGNOSIS — I10 HYPERTENSION, ESSENTIAL: ICD-10-CM

## 2024-02-16 DIAGNOSIS — E78.2 MIXED HYPERLIPIDEMIA: ICD-10-CM

## 2024-02-16 PROCEDURE — 3075F SYST BP GE 130 - 139MM HG: CPT | Performed by: INTERNAL MEDICINE

## 2024-02-16 PROCEDURE — 1036F TOBACCO NON-USER: CPT | Performed by: INTERNAL MEDICINE

## 2024-02-16 PROCEDURE — 71101 X-RAY EXAM UNILAT RIBS/CHEST: CPT | Mod: LT

## 2024-02-16 PROCEDURE — 3078F DIAST BP <80 MM HG: CPT | Performed by: INTERNAL MEDICINE

## 2024-02-16 PROCEDURE — 71101 X-RAY EXAM UNILAT RIBS/CHEST: CPT | Mod: LEFT SIDE | Performed by: RADIOLOGY

## 2024-02-16 PROCEDURE — 99214 OFFICE O/P EST MOD 30 MIN: CPT | Performed by: INTERNAL MEDICINE

## 2024-02-16 RX ORDER — HYDROCORTISONE 25 MG/G
OINTMENT TOPICAL 2 TIMES DAILY PRN
Qty: 453.6 G | Refills: 2 | Status: SHIPPED | OUTPATIENT
Start: 2024-02-16 | End: 2025-02-15

## 2024-02-16 ASSESSMENT — PATIENT HEALTH QUESTIONNAIRE - PHQ9
1. LITTLE INTEREST OR PLEASURE IN DOING THINGS: NOT AT ALL
2. FEELING DOWN, DEPRESSED OR HOPELESS: NOT AT ALL
SUM OF ALL RESPONSES TO PHQ9 QUESTIONS 1 AND 2: 0

## 2024-02-16 NOTE — PROGRESS NOTES
"Subjective   Patient ID: Jolly Adame is a 58 y.o. female who presents for primary care established.  HPI  Fu appt   Eczema hands  Few month pain under left breast more w movement   Not exercise  No arm pain    No injury  Intermittent  Wt gain last year 40 pounds  Spot on leg   Changing diet  Year round eczema  No cp no sob    Trying to eat better and exercise        Review of Systems  Gen:  no fever  HEENT:  no trouble swallowing  CV:  no dyspnea, cyanosis  Lungs:  no shortness of breath  GI:  no constipation, no blood in stool  Vascular:  no edema  Neuro:   no weakness  Skin:  no rash  MS:no joint swelling  Gu:  no urinary complaints  All other systems have been reviewed and are negative for complaint    /76   Pulse 94   Temp 36.4 °C (97.5 °F) (Temporal)   Ht 1.6 m (5' 3\")   Wt 74.5 kg (164 lb 3.2 oz)   SpO2 98%   BMI 29.09 kg/m²   Objective   Physical Exam  Lab Results   Component Value Date    WBC 7.0 02/14/2024    HGB 12.9 02/14/2024    HCT 40.2 02/14/2024    MCV 93 02/14/2024     02/14/2024     Lab Results   Component Value Date    GLUCOSE 121 (H) 02/14/2024    CALCIUM 9.2 02/14/2024     02/14/2024    K 4.4 02/14/2024    CO2 31 02/14/2024     02/14/2024    BUN 16 02/14/2024    CREATININE 0.79 02/14/2024     Social History     Socioeconomic History    Marital status:      Spouse name: None    Number of children: None    Years of education: None    Highest education level: None   Occupational History    None   Tobacco Use    Smoking status: Never    Smokeless tobacco: Never   Substance and Sexual Activity    Alcohol use: Yes     Alcohol/week: 2.0 standard drinks of alcohol     Types: 2 Glasses of wine per week    Drug use: Never    Sexual activity: Yes   Other Topics Concern    None   Social History Narrative    None     Social Determinants of Health     Financial Resource Strain: Not on file   Food Insecurity: Not on file   Transportation Needs: Not on file   Physical " Activity: Not on file   Stress: Not on file   Social Connections: Not on file   Intimate Partner Violence: Not on file   Housing Stability: Not on file     Family History   Problem Relation Name Age of Onset    Stroke Mother      Dementia Mother      Heart attack Mother      Diabetes Sister      Crohn's disease Sister      Irritable bowel syndrome Sister      Cancer Daughter      Colon cancer Maternal Grandfather      Breast cancer Other Grandmother        General:  Alert and in  NAD  Heent:  tms nl, throat clear.     Lungs, CTAB  Skin:  no suspicious lesions,  warm and dry  Head :  Normocephalic  Neck/thyroid:  neck supple, full rom, no cervical lymphadenopathy  no thyromegaly  Heart:  RRR  no murmurs  Abdomen:  Normal , bs present, soft, nontender, not distended, no masses palpated  Extremities:  No clubbing, cyanosis, or edema  Neurologic:  Nonfocal  Psych: alert, normal mood    Mild fungal rash under bl breasts  No left breast mass  Ribs nontender  Jolly was seen today for primary care established.  Diagnoses and all orders for this visit:  Elevated liver enzymes (Primary)  -     US right upper quadrant; Future  Ulcerative pancolitis without complication (CMS/HCC)  Mixed hyperlipidemia  Hypertension, essential  Eczema, unspecified type  -     hydrocortisone 2.5 % ointment; Apply topically 2 times a day as needed for irritation or rash.  Rib pain  Comments:  likley discomfort from wt gain  discussed  Orders:  -     XR ribs 2 views left w chest pa or ap; Future  Snoring  -     Home sleep apnea test (HSAT); Future  Dermatitis  -     nystatin (Mycostatin) ointment; Apply topically 4 times a day for 10 days.  Abdominal pain, unspecified abdominal location  -     US abdomen complete; Future  -     Lipase; Future    Chronic conditions reviewed in the assessment and plan.    Continue medications unless specified otherwise.  Previous labs reviewed.   Other specialty provider notes reviewed.

## 2024-02-19 ENCOUNTER — CLINICAL SUPPORT (OUTPATIENT)
Dept: PRIMARY CARE | Facility: CLINIC | Age: 59
End: 2024-02-19
Payer: COMMERCIAL

## 2024-02-19 ENCOUNTER — HOSPITAL ENCOUNTER (OUTPATIENT)
Dept: RADIOLOGY | Facility: CLINIC | Age: 59
Discharge: HOME | End: 2024-02-19
Payer: COMMERCIAL

## 2024-02-19 ENCOUNTER — TELEPHONE (OUTPATIENT)
Dept: PRIMARY CARE | Facility: CLINIC | Age: 59
End: 2024-02-19

## 2024-02-19 DIAGNOSIS — I10 HYPERTENSION, ESSENTIAL: ICD-10-CM

## 2024-02-19 DIAGNOSIS — J90 PLEURAL EFFUSION: Primary | ICD-10-CM

## 2024-02-19 DIAGNOSIS — J90 PLEURAL EFFUSION: ICD-10-CM

## 2024-02-19 LAB — LIPASE SERPL-CCNC: 22 U/L (ref 9–82)

## 2024-02-19 PROCEDURE — 71046 X-RAY EXAM CHEST 2 VIEWS: CPT | Mod: FOREIGN READ | Performed by: RADIOLOGY

## 2024-02-19 PROCEDURE — 71046 X-RAY EXAM CHEST 2 VIEWS: CPT

## 2024-02-19 RX ORDER — NYSTATIN 100000 U/G
OINTMENT TOPICAL 4 TIMES DAILY
Qty: 60 G | Refills: 0 | Status: SHIPPED | OUTPATIENT
Start: 2024-02-19 | End: 2024-02-29

## 2024-02-19 NOTE — PROGRESS NOTES
Patient brought her machine from home to compare readings with our bp machine    Her machine:  156/86 P84  Our machine:  150/80 P 83    Patient says her machine has been reading high so she is going to check into getting a new machine

## 2024-02-19 NOTE — TELEPHONE ENCOUNTER
Patient brought her machine from home to compare readings with our bp machine     Her machine:  156/86 P84  Our machine:  150/80 P 83    Patient states he machine has been running high so she is going to check into getting a new machine

## 2024-02-21 PROBLEM — M75.41 IMPINGEMENT SYNDROME OF RIGHT SHOULDER: Status: ACTIVE | Noted: 2017-02-14

## 2024-02-21 PROBLEM — K21.9 GERD (GASTROESOPHAGEAL REFLUX DISEASE): Status: ACTIVE | Noted: 2018-04-25

## 2024-02-21 PROBLEM — R10.9 ABDOMINAL PAIN: Status: ACTIVE | Noted: 2024-02-21

## 2024-02-21 PROBLEM — R74.8 ELEVATED LIVER ENZYMES: Status: ACTIVE | Noted: 2024-02-21

## 2024-02-21 PROBLEM — I25.10 CALCIFICATION OF CORONARY ARTERY: Status: ACTIVE | Noted: 2023-04-06

## 2024-02-21 PROBLEM — R31.9 HEMATURIA: Status: ACTIVE | Noted: 2020-10-30

## 2024-02-21 PROBLEM — I25.84 CALCIFICATION OF CORONARY ARTERY: Status: ACTIVE | Noted: 2023-04-06

## 2024-02-21 RX ORDER — VALSARTAN 80 MG/1
80 TABLET ORAL DAILY
Qty: 30 TABLET | Refills: 11 | Status: SHIPPED | OUTPATIENT
Start: 2024-02-21 | End: 2025-02-20

## 2024-02-22 ENCOUNTER — APPOINTMENT (OUTPATIENT)
Dept: RADIOLOGY | Facility: CLINIC | Age: 59
End: 2024-02-22
Payer: COMMERCIAL

## 2024-02-22 ENCOUNTER — HOSPITAL ENCOUNTER (OUTPATIENT)
Dept: RADIOLOGY | Facility: CLINIC | Age: 59
Discharge: HOME | End: 2024-02-22
Payer: COMMERCIAL

## 2024-02-22 DIAGNOSIS — R10.9 ABDOMINAL PAIN, UNSPECIFIED ABDOMINAL LOCATION: ICD-10-CM

## 2024-02-22 DIAGNOSIS — R74.8 ELEVATED LIVER ENZYMES: ICD-10-CM

## 2024-02-22 PROCEDURE — 76700 US EXAM ABDOM COMPLETE: CPT | Performed by: RADIOLOGY

## 2024-02-22 PROCEDURE — 76700 US EXAM ABDOM COMPLETE: CPT

## 2024-02-23 DIAGNOSIS — K76.0 FATTY LIVER: ICD-10-CM

## 2024-02-26 ENCOUNTER — TELEPHONE (OUTPATIENT)
Dept: PRIMARY CARE | Facility: CLINIC | Age: 59
End: 2024-02-26
Payer: COMMERCIAL

## 2024-02-26 DIAGNOSIS — Z20.9 CONTACT WITH OR EXPOSURE TO COMMUNICABLE DISEASE: ICD-10-CM

## 2024-02-28 ENCOUNTER — APPOINTMENT (OUTPATIENT)
Dept: PRIMARY CARE | Facility: CLINIC | Age: 59
End: 2024-02-28
Payer: COMMERCIAL

## 2024-02-28 ENCOUNTER — CLINICAL SUPPORT (OUTPATIENT)
Dept: SLEEP MEDICINE | Facility: HOSPITAL | Age: 59
End: 2024-02-28
Payer: COMMERCIAL

## 2024-02-28 DIAGNOSIS — R06.83 SNORING: ICD-10-CM

## 2024-02-28 DIAGNOSIS — G47.33 OBSTRUCTIVE SLEEP APNEA (ADULT) (PEDIATRIC): ICD-10-CM

## 2024-02-28 PROBLEM — R07.81 RIB PAIN: Status: ACTIVE | Noted: 2024-02-28

## 2024-02-28 PROBLEM — J90 PLEURAL EFFUSION: Status: ACTIVE | Noted: 2024-02-28

## 2024-02-28 PROCEDURE — 95806 SLEEP STUDY UNATT&RESP EFFT: CPT | Performed by: INTERNAL MEDICINE

## 2024-02-28 NOTE — PROGRESS NOTES
Type of Study: HOME SLEEP STUDY - NOMAD     The patient received equipment and instructions for use of the [facility] Nomad HSAT 4047 device. The patient was instructed how to apply the effort belts, cannula, thermistor. It was also explained how the Nomad and oximeter components work.  The patient was asked to record their sleep for an 8-hour period.     The patient was informed of their responsibility for the device and acknowledged this by signing the HSAT device contract. The patient was asked to return the device on 2/29/2024 between the hours of 9am to the [facility].     The patient was instructed to call 911 as usual for any medical- emergencies while at home.  The patient was also given a phone number for troubleshooting when using the device in case there were additional questions.

## 2024-03-07 DIAGNOSIS — I10 HYPERTENSION, ESSENTIAL: ICD-10-CM

## 2024-03-07 RX ORDER — DILTIAZEM HYDROCHLORIDE 120 MG/1
120 CAPSULE, COATED, EXTENDED RELEASE ORAL DAILY
Qty: 30 CAPSULE | Refills: 5 | Status: SHIPPED | OUTPATIENT
Start: 2024-03-07

## 2024-03-23 DIAGNOSIS — T78.40XD ALLERGY, SUBSEQUENT ENCOUNTER: Primary | ICD-10-CM

## 2024-03-25 RX ORDER — MONTELUKAST SODIUM 10 MG/1
10 TABLET ORAL DAILY
Qty: 90 TABLET | Refills: 3 | Status: SHIPPED | OUTPATIENT
Start: 2024-03-25

## 2024-04-03 ENCOUNTER — APPOINTMENT (OUTPATIENT)
Dept: GASTROENTEROLOGY | Facility: CLINIC | Age: 59
End: 2024-04-03
Payer: COMMERCIAL

## 2024-04-10 DIAGNOSIS — K51.919 ULCERATIVE COLITIS WITH COMPLICATION, UNSPECIFIED LOCATION (MULTI): ICD-10-CM

## 2024-04-10 DIAGNOSIS — I10 HYPERTENSION, ESSENTIAL: ICD-10-CM

## 2024-04-10 DIAGNOSIS — E78.5 HYPERLIPIDEMIA, UNSPECIFIED HYPERLIPIDEMIA TYPE: ICD-10-CM

## 2024-04-10 DIAGNOSIS — F41.9 ANXIETY: ICD-10-CM

## 2024-04-10 RX ORDER — METOPROLOL SUCCINATE 25 MG/1
TABLET, EXTENDED RELEASE ORAL
Qty: 60 TABLET | Refills: 2 | Status: SHIPPED | OUTPATIENT
Start: 2024-04-10

## 2024-04-11 DIAGNOSIS — E78.5 HYPERLIPIDEMIA, UNSPECIFIED HYPERLIPIDEMIA TYPE: ICD-10-CM

## 2024-04-12 DIAGNOSIS — F41.9 ANXIETY: Primary | ICD-10-CM

## 2024-04-12 RX ORDER — BUSPIRONE HYDROCHLORIDE 15 MG/1
7.5 TABLET ORAL DAILY
Qty: 90 TABLET | Refills: 0 | Status: SHIPPED | OUTPATIENT
Start: 2024-04-12

## 2024-04-12 RX ORDER — SIMVASTATIN 20 MG/1
20 TABLET, FILM COATED ORAL NIGHTLY
Qty: 30 TABLET | Refills: 11 | Status: SHIPPED | OUTPATIENT
Start: 2024-04-12

## 2024-04-26 ENCOUNTER — TELEPHONE (OUTPATIENT)
Dept: PRIMARY CARE | Facility: CLINIC | Age: 59
End: 2024-04-26
Payer: COMMERCIAL

## 2024-04-26 DIAGNOSIS — Z12.31 ENCOUNTER FOR SCREENING MAMMOGRAM FOR MALIGNANT NEOPLASM OF BREAST: ICD-10-CM

## 2024-05-17 ENCOUNTER — APPOINTMENT (OUTPATIENT)
Dept: PRIMARY CARE | Facility: CLINIC | Age: 59
End: 2024-05-17
Payer: COMMERCIAL

## 2024-05-29 ENCOUNTER — APPOINTMENT (OUTPATIENT)
Dept: PRIMARY CARE | Facility: CLINIC | Age: 59
End: 2024-05-29
Payer: COMMERCIAL

## 2024-06-07 ENCOUNTER — APPOINTMENT (OUTPATIENT)
Dept: GASTROENTEROLOGY | Facility: CLINIC | Age: 59
End: 2024-06-07
Payer: COMMERCIAL

## 2024-06-12 ENCOUNTER — APPOINTMENT (OUTPATIENT)
Dept: GASTROENTEROLOGY | Facility: CLINIC | Age: 59
End: 2024-06-12
Payer: COMMERCIAL

## 2024-06-24 NOTE — PROGRESS NOTES
Jolly Adame is a 59 y.o. female with past medical history of HTN, hyperlipidemia, and cholecystectomy 1995 who presents today for ulcerative colitis. Previously followed with Roxana Child NP. Initially diagnosed with UC at age 20. Initially treated with Asacol which worked well. Steroids intermittently for flares - has been about 8 years.     1995 Hospitalized for severe flare during lots of stress.    Currently takes Apriso 1.5 g daily. States that she occasionally takes WelChol if she has severe diarrhea but it has been months since she has had to do this (typically once or twice per year). Alternating diarrhea and constipation at baseline. May skip a few days and then have BM. Rarely has BM daily. Occasional diarrhea but stools mainly formed. No blood in stool. Random abdominal pain episodes that is described as mild and improves with defecation. Not frequent. No nausea, vomiting. Takes omeprazole 20 mg for reflux which works well.    Last colonoscopy 12/2023 normal. All biopsies with normal colonic mucosa. Colonoscopy prior to that was around 2020 in [place] (no records). Reportedly normal. Surgical history includes tonsillectomy, hysterectomy, and cholecystectomy.     Social history: No tobacco. Some alcohol a few days a week. Denies illicit drug use.    Family history: Sister has Crohn's disease. Other sister has IBS. Believes maternal grandfather had colon cancer.     Past Medical History:   Diagnosis Date    Abnormal MRI, shoulder 10/29/2022    Nondisplaced greater tuberosity left proximal humeral fracture.  Multiple foci of rotator cuff calcific tendinitis.  Small amount of bursal surface fraying of the distal supraspinatus tendon without evidence of full-thickness rotator cuff tear    Anxiety     Depression     GERD (gastroesophageal reflux disease)     Hyperlipidemia     Hypertension     Seasonal allergies      Current Outpatient Medications   Medication Sig Dispense Refill    ascorbic acid (Vitamin  C) 500 mg tablet Take 1 tablet (500 mg) by mouth.      busPIRone (Buspar) 15 mg tablet TAKE ONE-HALF TABLET BY MOUTH DAILY 90 tablet 0    cholecalciferol (Vitamin D-3) 50 mcg (2,000 unit) capsule Take by mouth once daily.      colesevelam (Welchol) 625 mg tablet Take 1 tablet (625 mg) by mouth. Take with meal(s) and a liquid.      dilTIAZem CD (Cardizem CD) 120 mg 24 hr capsule TAKE ONE CAPSULE BY MOUTH EVERY DAY 30 capsule 5    EPINEPHrine (EpiPen) 0.3 mg/0.3 mL injection syringe Inject as directed if needed. Inject into upper leg. Call 911 after use.      hydrocortisone 2.5 % ointment Apply topically 2 times a day as needed for irritation or rash. 453.6 g 2    melatonin 10 mg tablet Take 1 tablet (10 mg) by mouth once daily at bedtime.      mesalamine ER (Apriso) 0.375 gram 24 hr capsule Take 4 capsules (1.5 g) by mouth once daily. Do not crush or chew. 360 capsule 3    metoprolol succinate XL (Toprol-XL) 25 mg 24 hr tablet TAKE 1 TABLET BY MOUTH IN THE MORNING AND 1 TABLET BEFORE BEDTIME. DO NOT CRUSH OR CHEW 60 tablet 2    multivit-min/ferrous fumarate (MULTI VITAMIN ORAL) Take by mouth.      nortriptyline (Pamelor) 50 mg capsule 2 tablets nightly 60 capsule 11    omeprazole OTC (PriLOSEC OTC) 20 mg EC tablet Take 1 tablet (20 mg) by mouth. Do not crush, chew, or split.      simvastatin (Zocor) 20 mg tablet TAKE ONE TABLET BY MOUTH AT BEDTIME 30 tablet 11    valsartan (Diovan) 80 mg tablet Take 1 tablet (80 mg) by mouth once daily. 30 tablet 11    cetirizine (ZyrTEC) 10 mg tablet Take 1 tablet (10 mg) by mouth.       No current facility-administered medications for this visit.     Family History   Problem Relation Name Age of Onset    Stroke Mother      Dementia Mother      Heart attack Mother      Diabetes Sister      Crohn's disease Sister      Irritable bowel syndrome Sister      Cancer Daughter      Colon cancer Maternal Grandfather      Breast cancer Other Grandmother        Review of Systems  Review of  Systems negative except as noted in HPI.    Objective     /77 (BP Location: Left arm)   Pulse 76   Temp 36.4 °C (97.6 °F)   Wt 74.2 kg (163 lb 9.6 oz)   BMI 28.98 kg/m²      Physical Exam  Constitutional:  No acute distress. Normal appearance. Not ill-appearing.  HENT:  Head normocephalic and atraumatic. Conjunctivae normal.  Cardiovascular:  Normal rate. Regular rhythm.  Pulmonary:  Pulmonary effort normal. No respiratory distress. Breath sounds clear.  Abdominal:  Abdomen is  soft. There is no distension. No tenderness or guarding.  Skin: Dry.  Neurological:  Alert and oriented.  Psychiatric:  Mood and affect normal.    Assessment/Plan     59 y.o. female with history of HTN, hyperlipidemia, and cholecystectomy 1995 who presents today for clinic visit for UC. She has had UC diagnosed age 20. Treated with oral mesalamine with good improvement. She has not had a flare in many years and her last 2 colonoscopies 2020 and 2023 show a deep remission with no evidence of IBD.     Discussed attempting to lower mesalamine dose versus continuing with current medication regimen. She would like to continue at her current dose as her sister has Crohn's disease requiring biologic and she would like to prevent any flares/potential worsening of her disease.    Recommendations  Obtain labs.  Continue Apriso.  As long as you continue to do well follow up in 1 year.     Electronically signed by: Lynn Barrios CNP on 6/28/2024 at 2:49 PM

## 2024-06-28 ENCOUNTER — LAB (OUTPATIENT)
Dept: LAB | Facility: LAB | Age: 59
End: 2024-06-28
Payer: COMMERCIAL

## 2024-06-28 ENCOUNTER — APPOINTMENT (OUTPATIENT)
Dept: GASTROENTEROLOGY | Facility: CLINIC | Age: 59
End: 2024-06-28
Payer: COMMERCIAL

## 2024-06-28 VITALS
BODY MASS INDEX: 28.98 KG/M2 | TEMPERATURE: 97.6 F | DIASTOLIC BLOOD PRESSURE: 77 MMHG | HEART RATE: 76 BPM | WEIGHT: 163.6 LBS | SYSTOLIC BLOOD PRESSURE: 135 MMHG

## 2024-06-28 DIAGNOSIS — K51.00 ULCERATIVE PANCOLITIS WITHOUT COMPLICATION (MULTI): ICD-10-CM

## 2024-06-28 DIAGNOSIS — K51.00 ULCERATIVE PANCOLITIS WITHOUT COMPLICATION (MULTI): Primary | ICD-10-CM

## 2024-06-28 DIAGNOSIS — R74.8 ELEVATED LIVER ENZYMES: ICD-10-CM

## 2024-06-28 PROCEDURE — 1036F TOBACCO NON-USER: CPT | Performed by: NURSE PRACTITIONER

## 2024-06-28 PROCEDURE — 99214 OFFICE O/P EST MOD 30 MIN: CPT | Performed by: NURSE PRACTITIONER

## 2024-06-28 PROCEDURE — 3078F DIAST BP <80 MM HG: CPT | Performed by: NURSE PRACTITIONER

## 2024-06-28 PROCEDURE — 80053 COMPREHEN METABOLIC PANEL: CPT

## 2024-06-28 PROCEDURE — 3075F SYST BP GE 130 - 139MM HG: CPT | Performed by: NURSE PRACTITIONER

## 2024-06-28 PROCEDURE — 36415 COLL VENOUS BLD VENIPUNCTURE: CPT

## 2024-06-28 ASSESSMENT — ENCOUNTER SYMPTOMS: DEPRESSION: 0

## 2024-06-28 ASSESSMENT — PAIN SCALES - GENERAL: PAINLEVEL: 0-NO PAIN

## 2024-06-28 NOTE — PATIENT INSTRUCTIONS
Recommendations  Obtain labs.  Continue Apriso.  As long as you continue to do well follow up in 1 year. You can call my nurse, Kamila at [phone] with any questions or concerns.

## 2024-06-29 LAB
ALBUMIN SERPL BCP-MCNC: 4.5 G/DL (ref 3.4–5)
ALP SERPL-CCNC: 77 U/L (ref 33–110)
ALT SERPL W P-5'-P-CCNC: 41 U/L (ref 7–45)
ANION GAP SERPL CALC-SCNC: 13 MMOL/L (ref 10–20)
AST SERPL W P-5'-P-CCNC: 25 U/L (ref 9–39)
BILIRUB SERPL-MCNC: 0.4 MG/DL (ref 0–1.2)
BUN SERPL-MCNC: 12 MG/DL (ref 6–23)
CALCIUM SERPL-MCNC: 9.6 MG/DL (ref 8.6–10.6)
CHLORIDE SERPL-SCNC: 102 MMOL/L (ref 98–107)
CO2 SERPL-SCNC: 29 MMOL/L (ref 21–32)
CREAT SERPL-MCNC: 0.68 MG/DL (ref 0.5–1.05)
EGFRCR SERPLBLD CKD-EPI 2021: >90 ML/MIN/1.73M*2
GLUCOSE SERPL-MCNC: 123 MG/DL (ref 74–99)
POTASSIUM SERPL-SCNC: 4 MMOL/L (ref 3.5–5.3)
PROT SERPL-MCNC: 6.7 G/DL (ref 6.4–8.2)
SODIUM SERPL-SCNC: 140 MMOL/L (ref 136–145)

## 2024-07-03 ENCOUNTER — APPOINTMENT (OUTPATIENT)
Dept: PRIMARY CARE | Facility: CLINIC | Age: 59
End: 2024-07-03
Payer: COMMERCIAL

## 2024-07-11 DIAGNOSIS — I10 HYPERTENSION, ESSENTIAL: ICD-10-CM

## 2024-07-11 DIAGNOSIS — K51.919 ULCERATIVE COLITIS WITH COMPLICATION, UNSPECIFIED LOCATION (MULTI): ICD-10-CM

## 2024-07-11 DIAGNOSIS — E78.5 HYPERLIPIDEMIA, UNSPECIFIED HYPERLIPIDEMIA TYPE: ICD-10-CM

## 2024-07-11 DIAGNOSIS — F41.9 ANXIETY: ICD-10-CM

## 2024-07-11 RX ORDER — METOPROLOL SUCCINATE 25 MG/1
TABLET, EXTENDED RELEASE ORAL
Qty: 60 TABLET | Refills: 11 | Status: SHIPPED | OUTPATIENT
Start: 2024-07-11

## 2024-07-11 NOTE — TELEPHONE ENCOUNTER
Pt called in requesting her Rx for Metoprolol Succinate XL  BID be sent into [facility] in [place]. There is a notification in  Rx request .

## 2024-07-12 DIAGNOSIS — F41.9 ANXIETY: ICD-10-CM

## 2024-07-12 RX ORDER — NORTRIPTYLINE HYDROCHLORIDE 50 MG/1
CAPSULE ORAL
Qty: 60 CAPSULE | Refills: 3 | Status: SHIPPED | OUTPATIENT
Start: 2024-07-12

## 2024-08-01 ENCOUNTER — APPOINTMENT (OUTPATIENT)
Dept: RADIOLOGY | Facility: CLINIC | Age: 59
End: 2024-08-01
Payer: COMMERCIAL

## 2024-08-02 ENCOUNTER — APPOINTMENT (OUTPATIENT)
Dept: PRIMARY CARE | Facility: CLINIC | Age: 59
End: 2024-08-02
Payer: COMMERCIAL

## 2024-08-22 ENCOUNTER — HOSPITAL ENCOUNTER (OUTPATIENT)
Dept: RADIOLOGY | Facility: CLINIC | Age: 59
Discharge: HOME | End: 2024-08-22
Payer: COMMERCIAL

## 2024-08-22 DIAGNOSIS — Z12.31 ENCOUNTER FOR SCREENING MAMMOGRAM FOR MALIGNANT NEOPLASM OF BREAST: ICD-10-CM

## 2024-08-22 PROCEDURE — 77063 BREAST TOMOSYNTHESIS BI: CPT | Performed by: RADIOLOGY

## 2024-08-22 PROCEDURE — 77067 SCR MAMMO BI INCL CAD: CPT

## 2024-08-22 PROCEDURE — 77067 SCR MAMMO BI INCL CAD: CPT | Performed by: RADIOLOGY

## 2024-08-23 PROBLEM — R07.81 RIB PAIN: Status: RESOLVED | Noted: 2024-02-28 | Resolved: 2024-08-23

## 2024-08-23 PROBLEM — M25.519 SHOULDER PAIN: Status: RESOLVED | Noted: 2023-02-25 | Resolved: 2024-08-23

## 2024-08-28 ENCOUNTER — OFFICE VISIT (OUTPATIENT)
Dept: PRIMARY CARE | Facility: CLINIC | Age: 59
End: 2024-08-28
Payer: COMMERCIAL

## 2024-08-28 VITALS
OXYGEN SATURATION: 92 % | WEIGHT: 165.8 LBS | TEMPERATURE: 98.1 F | SYSTOLIC BLOOD PRESSURE: 128 MMHG | BODY MASS INDEX: 29.37 KG/M2 | DIASTOLIC BLOOD PRESSURE: 74 MMHG | HEART RATE: 71 BPM

## 2024-08-28 DIAGNOSIS — R35.0 URINARY FREQUENCY: ICD-10-CM

## 2024-08-28 DIAGNOSIS — R31.9 HEMATURIA, UNSPECIFIED TYPE: Primary | ICD-10-CM

## 2024-08-28 LAB
APPEARANCE UR: ABNORMAL
COLOR UR: ABNORMAL

## 2024-08-28 PROCEDURE — 1036F TOBACCO NON-USER: CPT | Performed by: NURSE PRACTITIONER

## 2024-08-28 PROCEDURE — 3074F SYST BP LT 130 MM HG: CPT | Performed by: NURSE PRACTITIONER

## 2024-08-28 PROCEDURE — 87186 SC STD MICRODIL/AGAR DIL: CPT

## 2024-08-28 PROCEDURE — 3078F DIAST BP <80 MM HG: CPT | Performed by: NURSE PRACTITIONER

## 2024-08-28 PROCEDURE — 99213 OFFICE O/P EST LOW 20 MIN: CPT | Performed by: NURSE PRACTITIONER

## 2024-08-28 PROCEDURE — 87086 URINE CULTURE/COLONY COUNT: CPT

## 2024-08-28 PROCEDURE — 81001 URINALYSIS AUTO W/SCOPE: CPT

## 2024-08-28 RX ORDER — PHENAZOPYRIDINE HYDROCHLORIDE 100 MG/1
100 TABLET, FILM COATED ORAL 3 TIMES DAILY PRN
Qty: 30 TABLET | Refills: 0 | Status: SHIPPED | OUTPATIENT
Start: 2024-08-28 | End: 2024-09-07

## 2024-08-28 RX ORDER — CEPHALEXIN 500 MG/1
500 CAPSULE ORAL 2 TIMES DAILY
Qty: 14 CAPSULE | Refills: 0 | Status: SHIPPED | OUTPATIENT
Start: 2024-08-28 | End: 2024-09-04

## 2024-08-28 ASSESSMENT — PATIENT HEALTH QUESTIONNAIRE - PHQ9
SUM OF ALL RESPONSES TO PHQ9 QUESTIONS 1 AND 2: 0
1. LITTLE INTEREST OR PLEASURE IN DOING THINGS: NOT AT ALL
2. FEELING DOWN, DEPRESSED OR HOPELESS: NOT AT ALL

## 2024-08-28 NOTE — ASSESSMENT & PLAN NOTE
Will repeat empiric UTI treatment with keflex  If this is not UTI will need troy for hematuria and urology fu

## 2024-08-28 NOTE — PROGRESS NOTES
Problem List Items Addressed This Visit       Hematuria - Primary    Overview     2020 CT:  1. No acute findings.  No noncontrast CT findings to definitely explain hematuria.  No urolithiasis or hydronephrosis.   2. Moderate to large amount of stool in the colon.  No evidence of bowel obstruction.   3. Status post cholecystectomy and hysterectomy.   4. Normal appendix.   11/2020 CT:  1. There is a sessile-appearing lesion at the base of the bladder towards the right measuring 0.7 x 2.0 x 1.1 cm and difficult to perceive on the previous noncontrast CT examination due to its sessile nature.  The mass enhances postcontrast concerning for transitional cell carcinoma and can account for patient's painless hematuria.   2. No mass in the kidneys or ureters.   3. Previous cholecystectomy, hysterectomy.   4. Mild constipation.   5. Normal-appearing appendix.   6. Probably a flash-filling hemangioma in the hepatic dome.     11/2/2020:  1. Acute Painless Hematuria: CT urogram on admit showed a bladder mass. Initiated CBI per Urology recs. S/p Cystoscopy by Dr. Bloch - no evidence of tumor, finding consistent with hemorrhagic cystitis. Ordered urine culture.   2. Bladder pain/spasm: resolved with tylenol and low dose oxycodone prn  3. Leukocytosis: WBC 11.76 on admit. 12.93 11/1/2020. Likely reactive. urine culture ordered. PCP to follow up.   4. Ulcerative Colitis: Continued home mesalamine.  5. Anxiety/Depression: Continued home buspar, nortriptyline.  6. GERD: Continued PPI          Current Assessment & Plan     Will repeat empiric UTI treatment with keflex  If this is not UTI will need troy for hematuria and urology fu          Relevant Medications    phenazopyridine (Pyridium) 100 mg tablet    cephalexin (Keflex) 500 mg capsule    Other Relevant Orders    Urinalysis with Reflex Microscopic    Urine Culture     Other Visit Diagnoses       Urinary frequency        empiric keflex  await UA, cx    Relevant Medications     phenazopyridine (Pyridium) 100 mg tablet    cephalexin (Keflex) 500 mg capsule    Other Relevant Orders    Urinalysis with Reflex Microscopic    Urine Culture             Subjective   Patient ID: Jolly Adame is a 59 y.o. female who presents for UTI sx (UTI sx/Started early Tuesday morning was in out of town 8/10 did telehealth prescribed antibiotic for her told to see pcp in it does not clear up/Sx urgency pain blood in urine/Has been taking Azo since Tuesday morning).  HPI  August 7 sx onset  Unsure which abx started 8/10   - completed 5 day course and felt fine   Sx re-started yesterday  No hx kidney stone  Hematuria tinting her urine  She is taking azo also  No fever  Mild bilat low back pain  Bm at baseline with UC  No dysuria  Feeling bladder not emptying  Frequency also     2020 CT:  1. No acute findings.  No noncontrast CT findings to definitely explain hematuria.  No urolithiasis or hydronephrosis.   2. Moderate to large amount of stool in the colon.  No evidence of bowel obstruction.   3. Status post cholecystectomy and hysterectomy.   4. Normal appendix.     11/2020 CT:  1. There is a sessile-appearing lesion at the base of the bladder towards the right measuring 0.7 x 2.0 x 1.1 cm and difficult to perceive on the previous noncontrast CT examination due to its sessile nature.  The mass enhances postcontrast concerning for transitional cell carcinoma and can account for patient's painless hematuria.   2. No mass in the kidneys or ureters.   3. Previous cholecystectomy, hysterectomy.   4. Mild constipation.   5. Normal-appearing appendix.   6. Probably a flash-filling hemangioma in the hepatic dome.       11/1/2020 copied:  [facility] DISCHARGE SUMMARY    Jolly Carr  MRN: [id number]   Account: [id number]  Admitted: 10/30/2020  Discharge Date/Time: 11/01/20 / 1:13 PM  ___________________________________________________________________    Handoff to PCP   PCP to address the following  1. Follow up Urine  "culture.     Clinical Summary    Jolly aCrr is a 55 y.o. female with a history of ulcerative colitis, anxiety/depression, and GERD who presented to [facility] 10/30/2020 with hematuria. She was found to have bladder mass on CT urogram. S/p Cystoscopy without any evidence of tumor.      1. Acute Painless Hematuria: CT urogram on admit showed a bladder mass. Initiated CBI per Urology recs. S/p Cystoscopy by Dr. Bloch - no evidence of tumor, finding consistent with hemorrhagic cystitis. Ordered urine culture.   2. Bladder pain/spasm: resolved with tylenol and low dose oxycodone prn  3. Leukocytosis: WBC 11.76 on admit. 12.93 11/1/2020. Likely reactive. urine culture ordered. PCP to follow up.   4. Ulcerative Colitis: Continued home mesalamine.  5. Anxiety/Depression: Continued home buspar, nortriptyline.  6. GERD: Continued PPI         Review of Systems   All other systems reviewed and are negative.      BP Readings from Last 3 Encounters:   08/28/24 128/74   06/28/24 135/77   02/16/24 139/76      Wt Readings from Last 3 Encounters:   08/28/24 75.2 kg (165 lb 12.8 oz)   06/28/24 74.2 kg (163 lb 9.6 oz)   02/16/24 74.5 kg (164 lb 3.2 oz)      BMI:   Estimated body mass index is 29.37 kg/m² as calculated from the following:    Height as of 2/16/24: 1.6 m (5' 3\").    Weight as of this encounter: 75.2 kg (165 lb 12.8 oz).    Objective   Physical Exam  Constitutional:       General: She is not in acute distress.  HENT:      Head: Normocephalic and atraumatic.      Nose: Nose normal.      Mouth/Throat:      Mouth: Mucous membranes are moist.   Eyes:      Extraocular Movements: Extraocular movements intact.      Conjunctiva/sclera: Conjunctivae normal.   Cardiovascular:      Rate and Rhythm: Normal rate and regular rhythm.      Pulses: Normal pulses.   Pulmonary:      Effort: Pulmonary effort is normal.      Breath sounds: Normal breath sounds.   Abdominal:      General: Bowel sounds are normal.      Palpations: Abdomen is " soft.      Tenderness: There is no right CVA tenderness or left CVA tenderness.      Comments: LUQ, LLQ, RLQ, over bladder tender    Musculoskeletal:         General: Normal range of motion.      Cervical back: Normal range of motion and neck supple.   Skin:     General: Skin is warm and dry.   Neurological:      General: No focal deficit present.      Mental Status: She is alert.   Psychiatric:         Mood and Affect: Mood normal.

## 2024-08-29 LAB
AMORPH CRY #/AREA UR COMP ASSIST: ABNORMAL /HPF
BACTERIA #/AREA URNS AUTO: ABNORMAL /HPF
RBC #/AREA URNS AUTO: ABNORMAL /HPF
SQUAMOUS #/AREA URNS AUTO: ABNORMAL /HPF
WBC #/AREA URNS AUTO: ABNORMAL /HPF
WBC CLUMPS #/AREA URNS AUTO: ABNORMAL /HPF

## 2024-08-31 LAB — BACTERIA UR CULT: ABNORMAL

## 2024-09-03 DIAGNOSIS — I10 HYPERTENSION, ESSENTIAL: ICD-10-CM

## 2024-09-03 RX ORDER — DILTIAZEM HYDROCHLORIDE 120 MG/1
120 CAPSULE, COATED, EXTENDED RELEASE ORAL DAILY
Qty: 90 CAPSULE | Refills: 1 | Status: SHIPPED | OUTPATIENT
Start: 2024-09-03

## 2024-09-25 ENCOUNTER — APPOINTMENT (OUTPATIENT)
Dept: PRIMARY CARE | Facility: CLINIC | Age: 59
End: 2024-09-25
Payer: COMMERCIAL

## 2024-10-02 ENCOUNTER — APPOINTMENT (OUTPATIENT)
Dept: PRIMARY CARE | Facility: CLINIC | Age: 59
End: 2024-10-02
Payer: COMMERCIAL

## 2024-10-02 ENCOUNTER — HOSPITAL ENCOUNTER (OUTPATIENT)
Dept: RADIOLOGY | Facility: CLINIC | Age: 59
Discharge: HOME | End: 2024-10-02
Payer: COMMERCIAL

## 2024-10-02 ENCOUNTER — TELEPHONE (OUTPATIENT)
Dept: PRIMARY CARE | Facility: CLINIC | Age: 59
End: 2024-10-02

## 2024-10-02 ENCOUNTER — LAB (OUTPATIENT)
Dept: LAB | Facility: LAB | Age: 59
End: 2024-10-02
Payer: COMMERCIAL

## 2024-10-02 VITALS
DIASTOLIC BLOOD PRESSURE: 71 MMHG | TEMPERATURE: 97 F | SYSTOLIC BLOOD PRESSURE: 129 MMHG | OXYGEN SATURATION: 98 % | BODY MASS INDEX: 28.95 KG/M2 | WEIGHT: 163.4 LBS | HEART RATE: 93 BPM

## 2024-10-02 DIAGNOSIS — R10.9 ABDOMINAL PAIN, UNSPECIFIED ABDOMINAL LOCATION: ICD-10-CM

## 2024-10-02 DIAGNOSIS — E78.2 MIXED HYPERLIPIDEMIA: ICD-10-CM

## 2024-10-02 DIAGNOSIS — L30.9 DERMATITIS: ICD-10-CM

## 2024-10-02 DIAGNOSIS — R07.89 CHEST PAIN, ATYPICAL: ICD-10-CM

## 2024-10-02 DIAGNOSIS — G47.33 OSA (OBSTRUCTIVE SLEEP APNEA): ICD-10-CM

## 2024-10-02 DIAGNOSIS — I10 PRIMARY HYPERTENSION: Primary | ICD-10-CM

## 2024-10-02 DIAGNOSIS — I10 PRIMARY HYPERTENSION: ICD-10-CM

## 2024-10-02 DIAGNOSIS — K76.0 FATTY LIVER: ICD-10-CM

## 2024-10-02 DIAGNOSIS — L30.0 NUMMULAR ECZEMA: ICD-10-CM

## 2024-10-02 LAB
25(OH)D3 SERPL-MCNC: 34 NG/ML (ref 30–100)
ALBUMIN SERPL BCP-MCNC: 4.6 G/DL (ref 3.4–5)
ALP SERPL-CCNC: 104 U/L (ref 33–110)
ALT SERPL W P-5'-P-CCNC: 39 U/L (ref 7–45)
AMORPH CRY #/AREA UR COMP ASSIST: ABNORMAL /HPF
ANION GAP SERPL CALC-SCNC: 10 MMOL/L (ref 10–20)
APPEARANCE UR: ABNORMAL
AST SERPL W P-5'-P-CCNC: 29 U/L (ref 9–39)
BASOPHILS # BLD AUTO: 0.04 X10*3/UL (ref 0–0.1)
BASOPHILS NFR BLD AUTO: 0.5 %
BILIRUB DIRECT SERPL-MCNC: 0.1 MG/DL (ref 0–0.3)
BILIRUB SERPL-MCNC: 0.4 MG/DL (ref 0–1.2)
BILIRUB UR STRIP.AUTO-MCNC: NEGATIVE MG/DL
BUN SERPL-MCNC: 12 MG/DL (ref 6–23)
CALCIUM SERPL-MCNC: 9.4 MG/DL (ref 8.6–10.3)
CHLORIDE SERPL-SCNC: 102 MMOL/L (ref 98–107)
CHOLEST SERPL-MCNC: 150 MG/DL (ref 0–199)
CHOLESTEROL/HDL RATIO: 9.5
CO2 SERPL-SCNC: 31 MMOL/L (ref 21–32)
COLOR UR: ABNORMAL
CREAT SERPL-MCNC: 0.81 MG/DL (ref 0.5–1.05)
EGFRCR SERPLBLD CKD-EPI 2021: 84 ML/MIN/1.73M*2
EOSINOPHIL # BLD AUTO: 0.2 X10*3/UL (ref 0–0.7)
EOSINOPHIL NFR BLD AUTO: 2.7 %
ERYTHROCYTE [DISTWIDTH] IN BLOOD BY AUTOMATED COUNT: 13.2 % (ref 11.5–14.5)
GLUCOSE SERPL-MCNC: 138 MG/DL (ref 74–99)
GLUCOSE UR STRIP.AUTO-MCNC: NORMAL MG/DL
HCT VFR BLD AUTO: 41.8 % (ref 36–46)
HDLC SERPL-MCNC: 15.8 MG/DL
HGB BLD-MCNC: 13.5 G/DL (ref 12–16)
IMM GRANULOCYTES # BLD AUTO: 0.02 X10*3/UL (ref 0–0.7)
IMM GRANULOCYTES NFR BLD AUTO: 0.3 % (ref 0–0.9)
KETONES UR STRIP.AUTO-MCNC: NEGATIVE MG/DL
LDLC SERPL CALC-MCNC: 69 MG/DL
LEUKOCYTE ESTERASE UR QL STRIP.AUTO: NEGATIVE
LYMPHOCYTES # BLD AUTO: 3.24 X10*3/UL (ref 1.2–4.8)
LYMPHOCYTES NFR BLD AUTO: 43 %
MCH RBC QN AUTO: 29.2 PG (ref 26–34)
MCHC RBC AUTO-ENTMCNC: 32.3 G/DL (ref 32–36)
MCV RBC AUTO: 91 FL (ref 80–100)
MONOCYTES # BLD AUTO: 0.74 X10*3/UL (ref 0.1–1)
MONOCYTES NFR BLD AUTO: 9.8 %
MUCOUS THREADS #/AREA URNS AUTO: ABNORMAL /LPF
NEUTROPHILS # BLD AUTO: 3.29 X10*3/UL (ref 1.2–7.7)
NEUTROPHILS NFR BLD AUTO: 43.7 %
NITRITE UR QL STRIP.AUTO: NEGATIVE
NON HDL CHOLESTEROL: 134 MG/DL (ref 0–149)
NRBC BLD-RTO: 0 /100 WBCS (ref 0–0)
PH UR STRIP.AUTO: 5.5 [PH]
PLATELET # BLD AUTO: 316 X10*3/UL (ref 150–450)
POTASSIUM SERPL-SCNC: 4.1 MMOL/L (ref 3.5–5.3)
PROT SERPL-MCNC: 6.9 G/DL (ref 6.4–8.2)
PROT UR STRIP.AUTO-MCNC: ABNORMAL MG/DL
RBC # BLD AUTO: 4.62 X10*6/UL (ref 4–5.2)
RBC # UR STRIP.AUTO: ABNORMAL /UL
RBC #/AREA URNS AUTO: ABNORMAL /HPF
SODIUM SERPL-SCNC: 139 MMOL/L (ref 136–145)
SP GR UR STRIP.AUTO: 1.02
SQUAMOUS #/AREA URNS AUTO: ABNORMAL /HPF
TRIGL SERPL-MCNC: 326 MG/DL (ref 0–149)
TSH SERPL-ACNC: 3.09 MIU/L (ref 0.44–3.98)
UROBILINOGEN UR STRIP.AUTO-MCNC: NORMAL MG/DL
VIT B12 SERPL-MCNC: 318 PG/ML (ref 211–911)
VLDL: 65 MG/DL (ref 0–40)
WBC # BLD AUTO: 7.5 X10*3/UL (ref 4.4–11.3)
WBC #/AREA URNS AUTO: ABNORMAL /HPF

## 2024-10-02 PROCEDURE — 84443 ASSAY THYROID STIM HORMONE: CPT

## 2024-10-02 PROCEDURE — 87086 URINE CULTURE/COLONY COUNT: CPT

## 2024-10-02 PROCEDURE — 80053 COMPREHEN METABOLIC PANEL: CPT

## 2024-10-02 PROCEDURE — 3078F DIAST BP <80 MM HG: CPT | Performed by: INTERNAL MEDICINE

## 2024-10-02 PROCEDURE — 71046 X-RAY EXAM CHEST 2 VIEWS: CPT

## 2024-10-02 PROCEDURE — 82607 VITAMIN B-12: CPT

## 2024-10-02 PROCEDURE — 82306 VITAMIN D 25 HYDROXY: CPT

## 2024-10-02 PROCEDURE — 3074F SYST BP LT 130 MM HG: CPT | Performed by: INTERNAL MEDICINE

## 2024-10-02 PROCEDURE — 85025 COMPLETE CBC W/AUTO DIFF WBC: CPT

## 2024-10-02 PROCEDURE — 81001 URINALYSIS AUTO W/SCOPE: CPT

## 2024-10-02 PROCEDURE — 71046 X-RAY EXAM CHEST 2 VIEWS: CPT | Performed by: RADIOLOGY

## 2024-10-02 PROCEDURE — 99214 OFFICE O/P EST MOD 30 MIN: CPT | Performed by: INTERNAL MEDICINE

## 2024-10-02 PROCEDURE — 36415 COLL VENOUS BLD VENIPUNCTURE: CPT

## 2024-10-02 PROCEDURE — 80061 LIPID PANEL: CPT

## 2024-10-02 PROCEDURE — 83036 HEMOGLOBIN GLYCOSYLATED A1C: CPT

## 2024-10-02 PROCEDURE — 82248 BILIRUBIN DIRECT: CPT

## 2024-10-02 RX ORDER — KETOCONAZOLE 20 MG/G
CREAM TOPICAL 2 TIMES DAILY
Qty: 60 G | Refills: 0 | Status: SHIPPED | OUTPATIENT
Start: 2024-10-02 | End: 2024-10-12

## 2024-10-02 ASSESSMENT — PATIENT HEALTH QUESTIONNAIRE - PHQ9
2. FEELING DOWN, DEPRESSED OR HOPELESS: NOT AT ALL
SUM OF ALL RESPONSES TO PHQ9 QUESTIONS 1 AND 2: 0
1. LITTLE INTEREST OR PLEASURE IN DOING THINGS: NOT AT ALL

## 2024-10-02 NOTE — TELEPHONE ENCOUNTER
CT Abd with IV conmtast dye ordered  Patient said she forget she is allergic and does violently vomit   Prefers without dye if possible , if not she will do allergy protocol ie: benadryl ?     Please advise

## 2024-10-02 NOTE — PROGRESS NOTES
Subjective   Patient ID: Jolly Adame is a 59 y.o. female who presents for Follow-up (Bilateral hand skin cracking /Left abd pain - side ).  HPI  Hands bad eczema  Not seen derm  Work as nurse at [employer]   Non latex gloves   Months pain in luq occ   No constipation  Uc under control  Gi saw  No blood   Upper l q abd pain  No exacerbating factors  Unknown what makes it worse  No vtg or diarrhea  No fever  intermittent  Review of Systems  Gen:  no fever  HEENT:  no trouble swallowing  CV:  no dyspnea, cyanosis  Lungs:  no shortness of breath  GI:  no constipation, no blood in stool  Vascular:  no edema  Neuro:   no weakness   MS:no joint swelling  Gu:  no urinary complaints  All other systems have been reviewed and are negative for complaint    /71   Pulse 93   Temp 36.1 °C (97 °F) (Temporal)   Wt 74.1 kg (163 lb 6.4 oz)   SpO2 98%   BMI 28.95 kg/m²   Objective   Physical Exam  Lab Results   Component Value Date    WBC 7.5 10/02/2024    HGB 13.5 10/02/2024    HCT 41.8 10/02/2024    MCV 91 10/02/2024     10/02/2024     Lab Results   Component Value Date    GLUCOSE 138 (H) 10/02/2024    CALCIUM 9.4 10/02/2024     10/02/2024    K 4.1 10/02/2024    CO2 31 10/02/2024     10/02/2024    BUN 12 10/02/2024    CREATININE 0.81 10/02/2024     Social History     Socioeconomic History    Marital status:    Tobacco Use    Smoking status: Never    Smokeless tobacco: Never   Substance and Sexual Activity    Alcohol use: Yes     Alcohol/week: 2.0 standard drinks of alcohol     Types: 2 Glasses of wine per week    Drug use: Never    Sexual activity: Yes     Social Determinants of Health     Financial Resource Strain: Low Risk  (3/16/2021)    Received from [facility]    Overall Financial Resource Strain (CARDIA)     Difficulty of Paying Living Expenses: Not hard at all   Food Insecurity: No Food Insecurity (3/16/2021)    Received from [facility]    Hunger Vital Sign     Worried  About Running Out of Food in the Last Year: Never true     Ran Out of Food in the Last Year: Never true   Transportation Needs: No Transportation Needs (3/16/2021)    [facility]    PRAPARE - Transportation     Lack of Transportation (Medical): No     Lack of Transportation (Non-Medical): No   Social Connections: Unknown (3/16/2021)    [facility]    Social Connection and Isolation Panel [NHANES]     Frequency of Communication with Friends and Family: Not asked     Frequency of Social Gatherings with Friends and Family: Not asked     Attends [employer] Services: Not asked     Active Member of Clubs or Organizations: No     Attends Club or Organization Meetings: Never     Marital Status: Not asked   Intimate Partner Violence: Unknown (3/16/2021)    [facility]    Humiliation, Afraid, Rape, and Kick questionnaire     Fear of Current or Ex-Partner: Not asked     Emotionally Abused: Not asked     Physically Abused: Not asked     Sexually Abused: Not asked     Family History   Problem Relation Name Age of Onset    Stroke Mother      Dementia Mother      Heart attack Mother      Diabetes Sister      Crohn's disease Sister      Irritable bowel syndrome Sister      Cancer Daughter      Breast cancer Maternal Grandmother      Colon cancer Maternal Grandfather      Breast cancer Paternal Grandmother      Breast cancer Mother's Sister      Breast cancer Mother's Sister         General:  Alert and in  NAD  Heent:  tms nl, throat clear.     Lungs, CTAB, mild tenderness lower rib cage on left  Skin:  no suspicious lesions,  warm and dry,  dry hands some cracking  saw pictures improved since then   Head :  Normocephalic  Neck/thyroid:  neck supple, full rom, no cervical lymphadenopathy  no thyromegaly  Heart:  RRR  no murmurs  Abdomen:  Normal , bs present, soft, nontender, not distended, no masses palpated  Extremities:  No clubbing, cyanosis, or  edema  Neurologic:  Nonfocal  Psych: alert, normal mood   Jolly was seen today for follow-up.  Diagnoses and all orders for this visit:  Primary hypertension (Primary)  -     Hemoglobin A1C; Future  -     Comprehensive Metabolic Panel; Future  -     TSH with reflex to Free T4 if abnormal; Future  -     Vitamin D 25-Hydroxy,Total (for eval of Vitamin D levels); Future  -     Vitamin B12; Future  -     Lipid Panel; Future  -     CBC and Auto Differential; Future  ZAMZAM (obstructive sleep apnea)  Comments:  mild  Orders:  -     Hemoglobin A1C; Future  -     Comprehensive Metabolic Panel; Future  -     TSH with reflex to Free T4 if abnormal; Future  -     Vitamin D 25-Hydroxy,Total (for eval of Vitamin D levels); Future  -     Vitamin B12; Future  -     Lipid Panel; Future  -     CBC and Auto Differential; Future  Mixed hyperlipidemia  -     Hemoglobin A1C; Future  -     Comprehensive Metabolic Panel; Future  -     TSH with reflex to Free T4 if abnormal; Future  -     Vitamin D 25-Hydroxy,Total (for eval of Vitamin D levels); Future  -     Vitamin B12; Future  -     Lipid Panel; Future  -     CBC and Auto Differential; Future  Nummular eczema  -     Referral to Dermatology  Dermatitis  -     Referral to Dermatology  -     ketoconazole (NIZOral) 2 % cream; Apply topically 2 times a day for 10 days.  Abdominal pain, unspecified abdominal location  -     Urinalysis with Reflex Microscopic; Future  -     Urine Culture; Future  -     Cancel: CT abdomen pelvis w IV contrast; Future  Chest pain, atypical  -     XR chest 2 views; Future  More left lower rib area nontender today   Chronic conditions reviewed in the assessment and plan.    Continue medications unless specified otherwise.  Previous labs reviewed.   Other specialty provider notes reviewed.   Follow up in 3 months or prn.

## 2024-10-03 LAB
EST. AVERAGE GLUCOSE BLD GHB EST-MCNC: 134 MG/DL
HBA1C MFR BLD: 6.3 %

## 2024-10-04 DIAGNOSIS — R31.9 HEMATURIA, UNSPECIFIED TYPE: Primary | ICD-10-CM

## 2024-10-04 LAB — BACTERIA UR CULT: NORMAL

## 2024-10-09 ENCOUNTER — HOSPITAL ENCOUNTER (OUTPATIENT)
Dept: RADIOLOGY | Facility: CLINIC | Age: 59
Discharge: HOME | End: 2024-10-09
Payer: COMMERCIAL

## 2024-10-09 DIAGNOSIS — F41.9 ANXIETY: ICD-10-CM

## 2024-10-09 DIAGNOSIS — R10.9 ABDOMINAL PAIN, UNSPECIFIED ABDOMINAL LOCATION: ICD-10-CM

## 2024-10-09 PROCEDURE — 74176 CT ABD & PELVIS W/O CONTRAST: CPT

## 2024-10-09 RX ORDER — BUSPIRONE HYDROCHLORIDE 15 MG/1
7.5 TABLET ORAL DAILY
Qty: 90 TABLET | Refills: 0 | Status: SHIPPED | OUTPATIENT
Start: 2024-10-09

## 2024-10-17 DIAGNOSIS — K76.0 FATTY LIVER: ICD-10-CM

## 2024-11-01 DIAGNOSIS — K51.919 ULCERATIVE COLITIS WITH COMPLICATION, UNSPECIFIED LOCATION (MULTI): ICD-10-CM

## 2024-11-03 DIAGNOSIS — F41.9 ANXIETY: ICD-10-CM

## 2024-11-04 RX ORDER — NORTRIPTYLINE HYDROCHLORIDE 50 MG/1
CAPSULE ORAL
Qty: 60 CAPSULE | Refills: 2 | Status: SHIPPED | OUTPATIENT
Start: 2024-11-04

## 2024-11-04 RX ORDER — MESALAMINE 0.38 G/1
CAPSULE, EXTENDED RELEASE ORAL
Qty: 360 CAPSULE | Refills: 0 | OUTPATIENT
Start: 2024-11-04

## 2024-11-10 ENCOUNTER — OFFICE VISIT (OUTPATIENT)
Dept: URGENT CARE | Age: 59
End: 2024-11-10
Payer: COMMERCIAL

## 2024-11-10 ENCOUNTER — HOSPITAL ENCOUNTER (EMERGENCY)
Facility: HOSPITAL | Age: 59
Discharge: HOME | End: 2024-11-11
Attending: STUDENT IN AN ORGANIZED HEALTH CARE EDUCATION/TRAINING PROGRAM
Payer: COMMERCIAL

## 2024-11-10 VITALS
DIASTOLIC BLOOD PRESSURE: 69 MMHG | HEIGHT: 62 IN | OXYGEN SATURATION: 98 % | TEMPERATURE: 98.1 F | WEIGHT: 150 LBS | RESPIRATION RATE: 16 BRPM | HEART RATE: 87 BPM | SYSTOLIC BLOOD PRESSURE: 134 MMHG | BODY MASS INDEX: 27.6 KG/M2

## 2024-11-10 DIAGNOSIS — R39.15 URGENCY OF URINATION: ICD-10-CM

## 2024-11-10 DIAGNOSIS — N30.90 CYSTITIS: Primary | ICD-10-CM

## 2024-11-10 DIAGNOSIS — R81 GLUCOSURIA: Primary | ICD-10-CM

## 2024-11-10 DIAGNOSIS — R73.9 HYPERGLYCEMIA: ICD-10-CM

## 2024-11-10 LAB
POC APPEARANCE, URINE: CLEAR
POC BILIRUBIN, URINE: NEGATIVE
POC BLOOD, URINE: NEGATIVE
POC COLOR, URINE: YELLOW
POC FINGERSTICK BLOOD GLUCOSE: 215 MG/DL (ref 70–100)
POC GLUCOSE, URINE: ABNORMAL MG/DL
POC KETONES, URINE: NEGATIVE MG/DL
POC LEUKOCYTES, URINE: NEGATIVE
POC NITRITE,URINE: NEGATIVE
POC PH, URINE: 6 PH
POC PROTEIN, URINE: NEGATIVE MG/DL
POC SPECIFIC GRAVITY, URINE: 1.01
POC UROBILINOGEN, URINE: 0.2 EU/DL

## 2024-11-10 PROCEDURE — 87086 URINE CULTURE/COLONY COUNT: CPT | Mod: STJLAB

## 2024-11-10 PROCEDURE — 81001 URINALYSIS AUTO W/SCOPE: CPT

## 2024-11-10 PROCEDURE — 99283 EMERGENCY DEPT VISIT LOW MDM: CPT

## 2024-11-10 PROCEDURE — 87086 URINE CULTURE/COLONY COUNT: CPT

## 2024-11-10 RX ORDER — MONTELUKAST SODIUM 10 MG/1
1 TABLET ORAL
COMMUNITY
Start: 2024-10-01

## 2024-11-10 ASSESSMENT — PAIN - FUNCTIONAL ASSESSMENT: PAIN_FUNCTIONAL_ASSESSMENT: 0-10

## 2024-11-10 ASSESSMENT — COLUMBIA-SUICIDE SEVERITY RATING SCALE - C-SSRS
6. HAVE YOU EVER DONE ANYTHING, STARTED TO DO ANYTHING, OR PREPARED TO DO ANYTHING TO END YOUR LIFE?: NO
2. HAVE YOU ACTUALLY HAD ANY THOUGHTS OF KILLING YOURSELF?: NO
1. IN THE PAST MONTH, HAVE YOU WISHED YOU WERE DEAD OR WISHED YOU COULD GO TO SLEEP AND NOT WAKE UP?: NO

## 2024-11-10 ASSESSMENT — PAIN DESCRIPTION - LOCATION: LOCATION: PERINEUM

## 2024-11-10 ASSESSMENT — PAIN SCALES - GENERAL: PAINLEVEL_OUTOF10: 4

## 2024-11-10 ASSESSMENT — PAIN DESCRIPTION - PAIN TYPE: TYPE: ACUTE PAIN

## 2024-11-10 NOTE — PROGRESS NOTES
Subjective   Patient ID: Jolly Adame is a 59 y.o. female. They present today with a chief complaint of Urinary Problem (Frequency / urgency / /).    History of Present Illness  HPI  3-3 Days of frequent painless urination. No blood noted in urine or stool. No abdominal pains. No back pain. No vaginal discharge or rash. No nausea, vomiting or diarrhea. No known exposures to STDs. Denies fevers or chills. No medications taken yet for symptoms.  No history of diabetes.      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  Multiple foci of rotator cuff calcific tendinitis.  Small amount of bursal surface fraying of the distal supraspinatus tendon without evidence of full-thickness rotator cuff tear    Anxiety     Depression     GERD (gastroesophageal reflux disease)     Hyperlipidemia     Hypertension     Seasonal allergies        No past surgical history on file.     reports that she has never smoked. She has never been exposed to tobacco smoke. She has never used smokeless tobacco. She reports current alcohol use of about 2.0 standard drinks of alcohol per week. She reports that she does not use drugs.    Review of Systems  Review of Systems  As in history of present illness                             Objective    Vitals:    11/10/24 1256   BP: 134/69   Pulse: 87   Resp: 16   Temp: 36.7 °C (98.1 °F)   SpO2: 98%   Weight: 68 kg (150 lb)  "  Height: 1.575 m (5' 2\")     No LMP recorded. Patient has had a hysterectomy.    Physical Exam  Alert and oriented, no apparent distress  Abdomen soft, nontender, nondistended.  No CVA tenderness  Skin shows no rashes sores or lesions   exam not indicated at this time  Procedures    Point of Care Test & Imaging Results from this visit  Results for orders placed or performed in visit on 11/10/24   POCT UA Automated manually resulted   Result Value Ref Range    POC Color, Urine Yellow Straw, Yellow, Light-Yellow    POC Appearance, Urine Clear Clear    POC Glucose, Urine 250 (2+) (A) NEGATIVE mg/dl    POC Bilirubin, Urine NEGATIVE NEGATIVE    POC Ketones, Urine NEGATIVE NEGATIVE mg/dl    POC Specific Gravity, Urine 1.010 1.005 - 1.035    POC Blood, Urine NEGATIVE NEGATIVE    POC PH, Urine 6.0 No Reference Range Established PH    POC Protein, Urine NEGATIVE NEGATIVE, 30 (1+) mg/dl    POC Urobilinogen, Urine 0.2 0.2, 1.0 EU/DL    Poc Nitrite, Urine NEGATIVE NEGATIVE    POC Leukocytes, Urine NEGATIVE NEGATIVE      No results found.    Diagnostic study results (if any) were reviewed by Blade Krishna MD.    Assessment/Plan   Allergies, medications, history, and pertinent labs/EKGs/Imaging reviewed by Blade Krishna MD.     Medical Decision Making  At time of discharge patient was clinically well-appearing and HDS for outpatient management. The patient and/or family was educated regarding diagnosis, supportive care, OTC and Rx medications. The patient and/or family was given the opportunity to ask questions prior to discharge.  They verbalized understanding of my discussion of the plans for treatment, expected course, indications to return to  or seek further evaluation in ED, and the need for timely follow up as directed.   They were provided with a work/school excuse if requested.    Orders and Diagnoses  Diagnoses and all orders for this visit:  Glucosuria  -     POCT glucose manually resulted  Urgency of urination  - "     POCT UA Automated manually resulted  Hyperglycemia      Medical Admin Record      Patient disposition: Home    Electronically signed by Blade Krishna MD  1:33 PM

## 2024-11-10 NOTE — PATIENT INSTRUCTIONS
Your elevated blood sugar indicates a propensity towards diabetes mellitus  Increase fluid intake and limit your carbohydrate intake for now  A urine culture has been sent.  Result will be available in 48 to 72 hours  See your PCP in the coming week to discuss your blood sugars as well as any pertinent treatment

## 2024-11-11 VITALS
HEART RATE: 88 BPM | BODY MASS INDEX: 28.35 KG/M2 | HEIGHT: 63 IN | RESPIRATION RATE: 16 BRPM | DIASTOLIC BLOOD PRESSURE: 80 MMHG | OXYGEN SATURATION: 99 % | WEIGHT: 160 LBS | TEMPERATURE: 97.2 F | SYSTOLIC BLOOD PRESSURE: 129 MMHG

## 2024-11-11 LAB
APPEARANCE UR: ABNORMAL
BACTERIA #/AREA URNS AUTO: ABNORMAL /HPF
BILIRUB UR STRIP.AUTO-MCNC: NEGATIVE MG/DL
COLOR UR: ABNORMAL
GLUCOSE UR STRIP.AUTO-MCNC: NORMAL MG/DL
HOLD SPECIMEN: NORMAL
KETONES UR STRIP.AUTO-MCNC: NEGATIVE MG/DL
LEUKOCYTE ESTERASE UR QL STRIP.AUTO: ABNORMAL
MUCOUS THREADS #/AREA URNS AUTO: ABNORMAL /LPF
NITRITE UR QL STRIP.AUTO: ABNORMAL
PH UR STRIP.AUTO: 5.5 [PH]
PROT UR STRIP.AUTO-MCNC: ABNORMAL MG/DL
RBC # UR STRIP.AUTO: ABNORMAL /UL
RBC #/AREA URNS AUTO: >20 /HPF
SP GR UR STRIP.AUTO: 1.02
SQUAMOUS #/AREA URNS AUTO: ABNORMAL /HPF
TRANS CELLS #/AREA UR COMP ASSIST: ABNORMAL /HPF
UROBILINOGEN UR STRIP.AUTO-MCNC: ABNORMAL MG/DL
WBC #/AREA URNS AUTO: >50 /HPF

## 2024-11-11 PROCEDURE — 2500000001 HC RX 250 WO HCPCS SELF ADMINISTERED DRUGS (ALT 637 FOR MEDICARE OP)

## 2024-11-11 RX ORDER — CEPHALEXIN 500 MG/1
500 CAPSULE ORAL 2 TIMES DAILY
Qty: 20 CAPSULE | Refills: 0 | Status: SHIPPED | OUTPATIENT
Start: 2024-11-11 | End: 2024-11-21

## 2024-11-11 RX ORDER — CEPHALEXIN 500 MG/1
500 CAPSULE ORAL ONCE
Status: COMPLETED | OUTPATIENT
Start: 2024-11-11 | End: 2024-11-11

## 2024-11-11 RX ORDER — CEPHALEXIN 500 MG/1
500 CAPSULE ORAL 2 TIMES DAILY
Qty: 14 CAPSULE | Refills: 0 | Status: CANCELLED | OUTPATIENT
Start: 2024-11-11 | End: 2024-11-18

## 2024-11-11 ASSESSMENT — PAIN SCALES - GENERAL: PAINLEVEL_OUTOF10: 0 - NO PAIN

## 2024-11-11 NOTE — ED PROVIDER NOTES
EMERGENCY DEPARTMENT ENCOUNTER      Pt Name: Jolly Adame  MRN: [id number]  Birthdate 1965  Date of evaluation: 11/10/2024  Provider: Harshad Camargo DO    CHIEF COMPLAINT       Chief Complaint   Patient presents with    Urinary Frequency    Blood in Urine         HISTORY OF PRESENT ILLNESS    This is a 59-year-old female with past medical history of hyperlipidemia, hypertension, UC, and previous UTIs who presents for concern for blood in her urine.  Patient states 1 day ago she began to have urinary frequency, urgency, dysuria, and lower abdominal pain.  This prompted her to go to the urgent care earlier today.  At urgent care they told her she had glucose in her urine but no UTI and recommended follow-up with PCP for possible diabetes. When the patient got home she states her symptoms worsened and she took one pill of her old phenazopyridine prescription.  Patient now has concern for blood in her urine due to its dark red appearance. Denies fevers, chills, vaginal bleeding, vaginal discharge, nausea, vomiting, change in bowel movements, chest pain, or shortness of breath.          Nursing Notes were reviewed.    PAST MEDICAL HISTORY     Past Medical History:   Diagnosis Date    Abnormal MRI, shoulder 10/29/2022    Nondisplaced greater tuberosity left proximal humeral fracture.  Multiple foci of rotator cuff calcific tendinitis.  Small amount of bursal surface fraying of the distal supraspinatus tendon without evidence of full-thickness rotator cuff tear    Anxiety     Depression     GERD (gastroesophageal reflux disease)     Hyperlipidemia     Hypertension     Seasonal allergies          SURGICAL HISTORY     History reviewed. No pertinent surgical history.      CURRENT MEDICATIONS       Current Discharge Medication List        CONTINUE these medications which have NOT CHANGED    Details   ascorbic acid (Vitamin C) 500 mg tablet Take 1 tablet (500 mg) by mouth.      busPIRone (Buspar) 15 mg tablet TAKE ONE-HALF  TABLET BY MOUTH DAILY  Qty: 90 tablet, Refills: 0    Associated Diagnoses: Anxiety      cholecalciferol (Vitamin D-3) 50 mcg (2,000 unit) capsule Take by mouth once daily.      colesevelam (Welchol) 625 mg tablet Take 1 tablet (625 mg) by mouth if needed. Take with meal(s) and a liquid.      dilTIAZem CD (Cardizem CD) 120 mg 24 hr capsule TAKE ONE CAPSULE BY MOUTH EVERY DAY  Qty: 90 capsule, Refills: 1    Associated Diagnoses: Hypertension, essential      EPINEPHrine (EpiPen) 0.3 mg/0.3 mL injection syringe Inject as directed if needed. Inject into upper leg. Call 911 after use.      hydrocortisone 2.5 % ointment Apply topically 2 times a day as needed for irritation or rash.  Qty: 453.6 g, Refills: 2    Associated Diagnoses: Eczema, unspecified type      melatonin 10 mg tablet Take 1 tablet (10 mg) by mouth once daily at bedtime.      mesalamine ER (Apriso) 0.375 gram 24 hr capsule Take 4 capsules (1.5 g) by mouth once daily. Do not crush or chew.  Qty: 360 capsule, Refills: 3    Associated Diagnoses: Ulcerative colitis with complication, unspecified location (Multi)      metoprolol succinate XL (Toprol-XL) 25 mg 24 hr tablet TAKE ONE TABLET BY MOUTH EVERY MORNING AND TAKE 1 TABLET BEFORE BEDTIME.  DO NOT CRUSH OR CHEW  Qty: 60 tablet, Refills: 11    Associated Diagnoses: Hypertension, essential; Hyperlipidemia, unspecified hyperlipidemia type; Anxiety; Ulcerative colitis with complication, unspecified location (Multi)      montelukast (Singulair) 10 mg tablet Take 1 tablet by mouth early in the morning..      multivit-min/ferrous fumarate (MULTI VITAMIN ORAL) Take by mouth.      nortriptyline (Pamelor) 50 mg capsule TAKE 2 CAPSULES BY MOUTH EVERY NIGHT  Qty: 60 capsule, Refills: 2    Associated Diagnoses: Anxiety      omeprazole OTC (PriLOSEC OTC) 20 mg EC tablet Take 1 tablet (20 mg) by mouth. Do not crush, chew, or split.      simvastatin (Zocor) 20 mg tablet TAKE ONE TABLET BY MOUTH AT BEDTIME  Qty: 30 tablet,  Refills: 11    Associated Diagnoses: Hyperlipidemia, unspecified hyperlipidemia type      valsartan (Diovan) 80 mg tablet Take 1 tablet (80 mg) by mouth once daily.  Qty: 30 tablet, Refills: 11    Associated Diagnoses: Hypertension, essential             ALLERGIES     Bactrim [sulfamethoxazole-trimethoprim], Bee venom protein (honey bee), Erythromycin, Ibuprofen, Iodinated contrast media, Paxlovid (eua) [nirmatrelvir-ritonavir], and Azithromycin    FAMILY HISTORY       Family History   Problem Relation Name Age of Onset    Stroke Mother      Dementia Mother      Heart attack Mother      Diabetes Sister      Crohn's disease Sister      Irritable bowel syndrome Sister      Cancer Daughter      Breast cancer Maternal Grandmother      Colon cancer Maternal Grandfather      Breast cancer Paternal Grandmother      Breast cancer Mother's Sister      Breast cancer Mother's Sister            SOCIAL HISTORY       Social History     Socioeconomic History    Marital status:    Tobacco Use    Smoking status: Never     Passive exposure: Never    Smokeless tobacco: Never   Substance and Sexual Activity    Alcohol use: Yes     Alcohol/week: 2.0 standard drinks of alcohol     Types: 2 Glasses of wine per week    Drug use: Never    Sexual activity: Yes     Social Drivers of Health     Financial Resource Strain: Low Risk  (3/16/2021)    Received from [facility]    Overall Financial Resource Strain (CARDIA)     Difficulty of Paying Living Expenses: Not hard at all   Food Insecurity: No Food Insecurity (3/16/2021)    Received from [facility]    Hunger Vital Sign     Worried About Running Out of Food in the Last Year: Never true     Ran Out of Food in the Last Year: Never true   Transportation Needs: No Transportation Needs (3/16/2021)    Received from [facility]    PRAPARE - Transportation     Lack of Transportation (Medical): No     Lack of Transportation (Non-Medical): No   Social  Connections: Unknown (3/16/2021)    [facility]    Social Connection and Isolation Panel [NHANES]     Frequency of Communication with Friends and Family: Not asked     Frequency of Social Gatherings with Friends and Family: Not asked     Attends Jainism Services: Not asked     Active Member of Clubs or Organizations: No     Attends Club or Organization Meetings: Never     Marital Status: Not asked   Intimate Partner Violence: Unknown (3/16/2021)    [facility]    Humiliation, Afraid, Rape, and Kick questionnaire     Fear of Current or Ex-Partner: Not asked     Emotionally Abused: Not asked     Physically Abused: Not asked     Sexually Abused: Not asked       SCREENINGS                        PHYSICAL EXAM    (up to 7 for level 4, 8 or more for level 5)     ED Triage Vitals [11/10/24 2233]   Temperature Heart Rate Respirations BP   36.2 °C (97.2 °F) (!) 102 18 143/80      Pulse Ox Temp Source Heart Rate Source Patient Position   98 % Temporal Monitor --      BP Location FiO2 (%)     -- --       Physical Exam  Constitutional:       General: She is not in acute distress.     Appearance: Normal appearance. She is not ill-appearing, toxic-appearing or diaphoretic.   HENT:      Head: Normocephalic and atraumatic.   Eyes:      Extraocular Movements: Extraocular movements intact.      Conjunctiva/sclera: Conjunctivae normal.   Cardiovascular:      Rate and Rhythm: Normal rate and regular rhythm.      Pulses: Normal pulses.      Heart sounds: Normal heart sounds. No murmur heard.     No friction rub. No gallop.   Pulmonary:      Effort: Pulmonary effort is normal. No respiratory distress.      Breath sounds: Normal breath sounds. No wheezing, rhonchi or rales.   Abdominal:      General: Abdomen is flat. Bowel sounds are normal. There is no distension.      Palpations: Abdomen is soft.      Tenderness: There is abdominal tenderness in the suprapubic area. There is no  guarding or rebound.   Musculoskeletal:         General: Normal range of motion.      Cervical back: Normal range of motion.   Skin:     General: Skin is warm and dry.   Neurological:      General: No focal deficit present.      Mental Status: She is alert.   Psychiatric:         Mood and Affect: Mood normal.         Behavior: Behavior normal.          DIAGNOSTIC RESULTS     LABS:  Labs Reviewed   URINALYSIS WITH REFLEX CULTURE AND MICROSCOPIC - Abnormal       Result Value    Color, Urine Dark-Brown (*)     Appearance, Urine Ex.Turbid (*)     Specific Gravity, Urine 1.025      pH, Urine 5.5      Protein, Urine 100 (2+) (*)     Glucose, Urine Normal      Blood, Urine OVER (3+) (*)     Ketones, Urine NEGATIVE      Bilirubin, Urine NEGATIVE      Urobilinogen, Urine 3 (1+) (*)     Nitrite, Urine 1+ (*)     Leukocyte Esterase, Urine 500 Aston/µL (*)     Narrative:     OVER is reported when the result is greater than the clinically reportable range.   MICROSCOPIC ONLY, URINE - Abnormal    WBC, Urine >50 (*)     RBC, Urine >20 (*)     Squamous Epithelial Cells, Urine 1-9 (SPARSE)      Transitional Epithelial Cells, Urine 1-2 (FEW)      Bacteria, Urine 2+ (*)     Mucus, Urine 1+     URINE CULTURE   URINALYSIS WITH REFLEX CULTURE AND MICROSCOPIC    Narrative:     The following orders were created for panel order Urinalysis with Reflex Culture and Microscopic.  Procedure                               Abnormality         Status                     ---------                               -----------         ------                     Urinalysis with Reflex C...[[id number]]  Abnormal            Final result               Extra Urine Gray Tube[[id number]]                            In process                   Please view results for these tests on the individual orders.   EXTRA URINE GRAY TUBE       All other labs were within normal range or not returned as of this dictation.    Imaging  No orders to display         Procedures  Procedures     EMERGENCY DEPARTMENT COURSE/MDM:   Medical Decision Making  This is a 59-year-old female with past medical history of hyperlipidemia, hypertension, UC, and previous UTIs who presents for concern for blood in her urine and urinary urgency, frequency and lower abdominal pain. Vitals were stable in the ED, patient was initially tachycardic with HR of 102 which improved to 90 upon reevaulation. On physical exam, there was mild suprapubic tenderness.  Differentials include UTI, hemorragic cystits, diverticulitis, UC exacerbation.  Workup includes UA with reflex culture, which showed presence of blood, nitrites, leukocyte esterase, red blood cells, white blood cells, and bacteria.  Given the results of her urine this is likely hemorrhagic cystitis. Will give a a dose of Keflex 500 mg in the ED. Patient discharged in stable condition with prescription for Keflex 500 mg to be taken twice daily for 7 days. Follow-up with PCP outpatient. Return precautions discussed with the patient and she is agreeable with this plan.     I have seen and evaluated the patient and agree with the medical student's documentation as above.  I have edited and made adjustments as needed.  Plan of care was discussed with the attending physician.    Please see ED course for the rest of the MDM.    Harshad Camargo DO, PGY-3  Emergency Medicine        Please see ED course for additional MDM.    Diagnoses as of 11/11/24 0044   Cystitis        No orders to display       Patient and or family in agreement and understanding of treatment plan.  All questions answered.      I reviewed the case with the attending ED physician. The attending ED physician agrees with the plan. Patient and/or patient´s representative was counseled regarding labs, imaging, likely diagnosis, and plan. All questions were answered.    ED Medications administered this visit:    Medications   cephalexin (Keflex) capsule 500 mg (has no administration in time  range)       New Prescriptions from this visit:    Current Discharge Medication List        START taking these medications    Details   cephalexin (Keflex) 500 mg capsule Take 1 capsule (500 mg) by mouth 2 times a day for 10 days.  Qty: 20 capsule, Refills: 0    Associated Diagnoses: Cystitis             Follow-up:  Rozina Raza MD  [address] [place] [address] [address]  [facility] [id number] [phone]    Schedule an appointment as soon as possible for a visit           Final Impression:   1. Cystitis          (Please note that portions of this note were completed with a voice recognition program.  Efforts were made to edit the dictations but occasionally words are mis-transcribed.)     Karla Harris  11/11/24 0026       Harshad Camargo DO  Resident  11/11/24 0044

## 2024-11-11 NOTE — ED TRIAGE NOTES
Patient states she went to urgent care for urinary frequency 11/10 1330. Patient states since getting home it has been getting progressively worse and has noticed some blood in the urine.

## 2024-11-11 NOTE — DISCHARGE INSTRUCTIONS
We have given you a prescription for keflex to treat your UTI at home.  Please take this as prescribed to completion.  Please be otherwise been [place] for signs symptoms of worsening infection including fevers, chills, vomiting, nausea, or any new or concerning symptoms.    Please otherwise follow-up with your primary care provider for routine ER follow-up.

## 2024-11-12 LAB — BACTERIA UR CULT: ABNORMAL

## 2024-11-13 ENCOUNTER — CLINICAL SUPPORT (OUTPATIENT)
Dept: GASTROENTEROLOGY | Facility: HOSPITAL | Age: 59
End: 2024-11-13
Payer: COMMERCIAL

## 2024-11-13 DIAGNOSIS — K76.0 FATTY LIVER: ICD-10-CM

## 2024-11-13 LAB — BACTERIA UR CULT: ABNORMAL

## 2024-11-19 ENCOUNTER — APPOINTMENT (OUTPATIENT)
Dept: UROLOGY | Facility: CLINIC | Age: 59
End: 2024-11-19
Payer: COMMERCIAL

## 2024-11-20 ENCOUNTER — TELEPHONE (OUTPATIENT)
Dept: GASTROENTEROLOGY | Facility: HOSPITAL | Age: 59
End: 2024-11-20

## 2024-11-20 ENCOUNTER — OFFICE VISIT (OUTPATIENT)
Dept: PRIMARY CARE | Facility: CLINIC | Age: 59
End: 2024-11-20
Payer: COMMERCIAL

## 2024-11-20 VITALS
WEIGHT: 163 LBS | HEIGHT: 63 IN | DIASTOLIC BLOOD PRESSURE: 84 MMHG | SYSTOLIC BLOOD PRESSURE: 137 MMHG | BODY MASS INDEX: 28.88 KG/M2 | HEART RATE: 105 BPM | TEMPERATURE: 97.4 F | OXYGEN SATURATION: 96 %

## 2024-11-20 DIAGNOSIS — N30.01 ACUTE CYSTITIS WITH HEMATURIA: Primary | ICD-10-CM

## 2024-11-20 DIAGNOSIS — K76.0 STEATOSIS OF LIVER: ICD-10-CM

## 2024-11-20 DIAGNOSIS — K51.919 ULCERATIVE COLITIS WITH COMPLICATION, UNSPECIFIED LOCATION (MULTI): ICD-10-CM

## 2024-11-20 DIAGNOSIS — E78.2 MIXED HYPERLIPIDEMIA: ICD-10-CM

## 2024-11-20 DIAGNOSIS — R73.03 PREDIABETES: ICD-10-CM

## 2024-11-20 LAB
AMORPH CRY #/AREA UR COMP ASSIST: ABNORMAL /HPF
APPEARANCE UR: ABNORMAL
BILIRUB UR STRIP.AUTO-MCNC: NEGATIVE MG/DL
COLOR UR: ABNORMAL
GLUCOSE UR STRIP.AUTO-MCNC: ABNORMAL MG/DL
KETONES UR STRIP.AUTO-MCNC: NEGATIVE MG/DL
LEUKOCYTE ESTERASE UR QL STRIP.AUTO: ABNORMAL
NITRITE UR QL STRIP.AUTO: NEGATIVE
PH UR STRIP.AUTO: 5.5 [PH]
POC APPEARANCE, URINE: ABNORMAL
POC BILIRUBIN, URINE: NEGATIVE
POC BLOOD, URINE: NEGATIVE
POC COLOR, URINE: YELLOW
POC GLUCOSE, URINE: ABNORMAL MG/DL
POC KETONES, URINE: NEGATIVE MG/DL
POC LEUKOCYTES, URINE: NEGATIVE
POC NITRITE,URINE: NEGATIVE
POC PH, URINE: 6 PH
POC PROTEIN, URINE: NEGATIVE MG/DL
POC SPECIFIC GRAVITY, URINE: >=1.03
POC UROBILINOGEN, URINE: 0.2 EU/DL
PROT UR STRIP.AUTO-MCNC: ABNORMAL MG/DL
RBC # UR STRIP.AUTO: NEGATIVE /UL
RBC #/AREA URNS AUTO: ABNORMAL /HPF
SP GR UR STRIP.AUTO: 1.03
UROBILINOGEN UR STRIP.AUTO-MCNC: NORMAL MG/DL
WBC #/AREA URNS AUTO: ABNORMAL /HPF

## 2024-11-20 PROCEDURE — 81001 URINALYSIS AUTO W/SCOPE: CPT

## 2024-11-20 PROCEDURE — 87086 URINE CULTURE/COLONY COUNT: CPT

## 2024-11-20 PROCEDURE — 3075F SYST BP GE 130 - 139MM HG: CPT | Performed by: NURSE PRACTITIONER

## 2024-11-20 PROCEDURE — 81003 URINALYSIS AUTO W/O SCOPE: CPT | Performed by: NURSE PRACTITIONER

## 2024-11-20 PROCEDURE — 3079F DIAST BP 80-89 MM HG: CPT | Performed by: NURSE PRACTITIONER

## 2024-11-20 PROCEDURE — 99214 OFFICE O/P EST MOD 30 MIN: CPT | Performed by: NURSE PRACTITIONER

## 2024-11-20 PROCEDURE — 1036F TOBACCO NON-USER: CPT | Performed by: NURSE PRACTITIONER

## 2024-11-20 PROCEDURE — 3008F BODY MASS INDEX DOCD: CPT | Performed by: NURSE PRACTITIONER

## 2024-11-20 RX ORDER — NITROFURANTOIN 25; 75 MG/1; MG/1
100 CAPSULE ORAL 2 TIMES DAILY
Qty: 14 CAPSULE | Refills: 0 | Status: SHIPPED | OUTPATIENT
Start: 2024-11-20 | End: 2024-11-27

## 2024-11-20 NOTE — TELEPHONE ENCOUNTER
Patient called regarding prescription mesalamine ER (Apriso) 0.375 gram 24 hr capsule . She stated PCP told her she would need to call [facility] to request refill. Pharmacy on file is current.

## 2024-11-20 NOTE — PROGRESS NOTES
Subjective   Patient ID: Jolly Adame is a 59 y.o. female who presents for UTI (Discuss liver lab work result //UTI - had Abx for UTI - doesn't feel like its resolved /Frequency  and urgency ).    Had UTI in August  Had 2 antibiotics    Then had another one  UC said neg  Then went to hospital with bloody urine  Was placed on keflex  Had pain with urination and bladder pain  Had a procedure cystoscopy in past  Had a blood clot  Feels a sense of urgency all the time  Today  Is normal baseline sense of urgency  Keflex last dose is tomorrow  Going to [facility]    Fatty liver, stage 3  Had fibroscan  Worried about it  Can see nutritionist at work  Hx of UC, limited in what she can eat        Review of Systems  ROS completely negative except what was mentioned in the HPI.  Problem List, surgical, social, and family histories which were reviewed and updated as necessary within the EMR. I also personally reviewed the notes, labs, and imaging that pertained to what was documented or discussed in the HPI.    Objective   Physical Exam  Vitals and nursing note reviewed.   Constitutional:       General: She is not in acute distress.     Appearance: Normal appearance.   HENT:      Head: Normocephalic and atraumatic.      Right Ear: External ear normal.      Left Ear: External ear normal.      Nose: Nose normal.      Mouth/Throat:      Mouth: Mucous membranes are moist.   Eyes:      Extraocular Movements: Extraocular movements intact.      Conjunctiva/sclera: Conjunctivae normal.      Pupils: Pupils are equal, round, and reactive to light.   Cardiovascular:      Rate and Rhythm: Normal rate and regular rhythm.      Heart sounds: Normal heart sounds.   Pulmonary:      Effort: Pulmonary effort is normal.      Breath sounds: Normal breath sounds.   Musculoskeletal:         General: Normal range of motion.      Cervical back: Normal range of motion and neck supple.   Skin:     General: Skin is warm and dry.   Neurological:       "General: No focal deficit present.      Mental Status: She is alert and oriented to person, place, and time. Mental status is at baseline.   Psychiatric:         Mood and Affect: Mood normal.         Behavior: Behavior normal.         Thought Content: Thought content normal.         Judgment: Judgment normal.         /84   Pulse 105   Temp 36.3 °C (97.4 °F) (Temporal)   Ht 1.6 m (5' 3\")   Wt 73.9 kg (163 lb)   SpO2 96%   BMI 28.87 kg/m²     Assessment/Plan    Problem List Items Addressed This Visit       Acute cystitis with hematuria - Primary    Current Assessment & Plan     Had 2 UTIs, with 2 rounds of keflex  Will recheck today to ensure it is resolved  She is going out of town, macrobid sent to take if culture is positive         Relevant Medications    nitrofurantoin, macrocrystal-monohydrate, (Macrobid) 100 mg capsule    Other Relevant Orders    POCT UA Automated manually resulted (Completed)    Urinalysis with Reflex Microscopic (Completed)    Urine Culture    Microscopic Only, Urine (Completed)    Hyperlipidemia    Overview     On statin         Current Assessment & Plan     Discussed lifestyle modification  She will speak with her nutritionist at work  FU in 3 mo  Consider fish oil for high triglycerides         Prediabetes    Overview     10/2/24:  A1C = 6.3         Current Assessment & Plan     Glucosuria  Nonfasting glucose in UC was 215  Discussed lifestyle modifications  Rec'd to start metformin, low dose, pt declined, worried about UC  Will she consult nutritionist at her work  FU in 3 months with labs           Relevant Orders    Hemoglobin A1c    Comprehensive metabolic panel    Steatosis of liver    Overview     11/19/24  Fibroscan  Results:   E (median liver stiffness measurement): 7.2 kPa   CAP (controlled attenuation parameter): 356 dB/m   Interpretation:   This was a technically adequate study.   The Fibrosis score is consistent with Metavir F0-1.   The CAP score is consistent with " steatosis grade 3.          Current Assessment & Plan     Hepatology referral  Check hepatic function in 3 mo  Discussed lifestyle modification to improve prediabetes and fatty liver         Relevant Orders    Referral to Hepatology    Hepatic function panel    Comprehensive metabolic panel

## 2024-11-20 NOTE — PATIENT INSTRUCTIONS
[phone]  [facility]  Dr Eileen Abreu    If culture positive, take macrobid  If culture still pending, can begin macrobid if having mild symptoms until resulted    Diet and exercise  - 1 modification at a time    A1C and liver function test in jan    FU in 3 mo    Talk to work dietician

## 2024-11-21 ENCOUNTER — APPOINTMENT (OUTPATIENT)
Dept: PRIMARY CARE | Facility: CLINIC | Age: 59
End: 2024-11-21
Payer: COMMERCIAL

## 2024-11-21 DIAGNOSIS — R73.03 PREDIABETES: Primary | ICD-10-CM

## 2024-11-21 DIAGNOSIS — R81 GLUCOSURIA: ICD-10-CM

## 2024-11-21 PROBLEM — K76.0 STEATOSIS OF LIVER: Status: ACTIVE | Noted: 2024-11-21

## 2024-11-21 PROBLEM — N30.01 ACUTE CYSTITIS WITH HEMATURIA: Status: ACTIVE | Noted: 2024-11-21

## 2024-11-21 PROBLEM — R10.9 ABDOMINAL PAIN: Status: RESOLVED | Noted: 2024-02-21 | Resolved: 2024-11-21

## 2024-11-21 RX ORDER — MESALAMINE 0.38 G/1
1.5 CAPSULE, EXTENDED RELEASE ORAL DAILY
Qty: 360 CAPSULE | Refills: 3 | Status: SHIPPED | OUTPATIENT
Start: 2024-11-21 | End: 2025-11-21

## 2024-11-21 NOTE — ASSESSMENT & PLAN NOTE
Hepatology referral  Check hepatic function in 3 mo  Discussed lifestyle modification to improve prediabetes and fatty liver

## 2024-11-21 NOTE — ASSESSMENT & PLAN NOTE
Glucosuria  Nonfasting glucose in UC was 215  Discussed lifestyle modifications  Rec'd to start metformin, low dose, pt declined, worried about UC  Will she consult nutritionist at her work  FU in 3 months with labs

## 2024-11-21 NOTE — ASSESSMENT & PLAN NOTE
Had 2 UTIs, with 2 rounds of keflex  Will recheck today to ensure it is resolved  She is going out of town, macrobid sent to take if culture is positive

## 2024-11-21 NOTE — ASSESSMENT & PLAN NOTE
Discussed lifestyle modification  She will speak with her nutritionist at work  FU in 3 mo  Consider fish oil for high triglycerides

## 2024-11-22 ENCOUNTER — TELEPHONE (OUTPATIENT)
Dept: PRIMARY CARE | Facility: CLINIC | Age: 59
End: 2024-11-22
Payer: COMMERCIAL

## 2024-11-22 LAB — BACTERIA UR CULT: NO GROWTH

## 2024-11-22 NOTE — TELEPHONE ENCOUNTER
Patient left a voicemail saying she wasn't sure if she would start her antibiotics due to her urine culture coming back negative.    Tried to call patient back, no answer

## 2024-12-04 ENCOUNTER — APPOINTMENT (OUTPATIENT)
Dept: GASTROENTEROLOGY | Facility: HOSPITAL | Age: 59
End: 2024-12-04
Payer: COMMERCIAL

## 2024-12-10 ENCOUNTER — TELEPHONE (OUTPATIENT)
Dept: PRIMARY CARE | Facility: CLINIC | Age: 59
End: 2024-12-10
Payer: COMMERCIAL

## 2024-12-10 NOTE — TELEPHONE ENCOUNTER
Pt's  called in regarding his wife. He stated that his wife needed to repeat UA to make sure bacteria has subsided. I informed him that orders are in system and that she can have collected at any  lab. He verbally understood.

## 2024-12-13 ENCOUNTER — LAB (OUTPATIENT)
Dept: LAB | Facility: LAB | Age: 59
End: 2024-12-13
Payer: COMMERCIAL

## 2024-12-13 DIAGNOSIS — E11.9 TYPE 2 DIABETES MELLITUS WITHOUT COMPLICATION, WITHOUT LONG-TERM CURRENT USE OF INSULIN (MULTI): Primary | ICD-10-CM

## 2024-12-13 DIAGNOSIS — K76.0 STEATOSIS OF LIVER: ICD-10-CM

## 2024-12-13 DIAGNOSIS — R73.03 PREDIABETES: ICD-10-CM

## 2024-12-13 DIAGNOSIS — R81 GLUCOSURIA: ICD-10-CM

## 2024-12-13 LAB
ALBUMIN SERPL BCP-MCNC: 4.4 G/DL (ref 3.4–5)
ALP SERPL-CCNC: 108 U/L (ref 33–110)
ALT SERPL W P-5'-P-CCNC: 48 U/L (ref 7–45)
ANION GAP SERPL CALC-SCNC: 10 MMOL/L (ref 10–20)
APPEARANCE UR: CLEAR
AST SERPL W P-5'-P-CCNC: 27 U/L (ref 9–39)
BILIRUB SERPL-MCNC: 0.8 MG/DL (ref 0–1.2)
BILIRUB UR STRIP.AUTO-MCNC: NEGATIVE MG/DL
BUN SERPL-MCNC: 13 MG/DL (ref 6–23)
CALCIUM SERPL-MCNC: 9.3 MG/DL (ref 8.6–10.3)
CHLORIDE SERPL-SCNC: 102 MMOL/L (ref 98–107)
CO2 SERPL-SCNC: 29 MMOL/L (ref 21–32)
COLOR UR: YELLOW
CREAT SERPL-MCNC: 0.8 MG/DL (ref 0.5–1.05)
EGFRCR SERPLBLD CKD-EPI 2021: 86 ML/MIN/1.73M*2
GLUCOSE SERPL-MCNC: 133 MG/DL (ref 74–99)
GLUCOSE UR STRIP.AUTO-MCNC: NORMAL MG/DL
KETONES UR STRIP.AUTO-MCNC: NEGATIVE MG/DL
LEUKOCYTE ESTERASE UR QL STRIP.AUTO: NEGATIVE
NITRITE UR QL STRIP.AUTO: NEGATIVE
PH UR STRIP.AUTO: 5.5 [PH]
POTASSIUM SERPL-SCNC: 4.1 MMOL/L (ref 3.5–5.3)
PROT SERPL-MCNC: 6.9 G/DL (ref 6.4–8.2)
PROT UR STRIP.AUTO-MCNC: NEGATIVE MG/DL
RBC # UR STRIP.AUTO: NEGATIVE /UL
SODIUM SERPL-SCNC: 137 MMOL/L (ref 136–145)
SP GR UR STRIP.AUTO: 1.01
UROBILINOGEN UR STRIP.AUTO-MCNC: NORMAL MG/DL

## 2024-12-13 PROCEDURE — 80053 COMPREHEN METABOLIC PANEL: CPT

## 2024-12-13 PROCEDURE — 36415 COLL VENOUS BLD VENIPUNCTURE: CPT

## 2024-12-13 PROCEDURE — 81003 URINALYSIS AUTO W/O SCOPE: CPT

## 2024-12-13 PROCEDURE — 83036 HEMOGLOBIN GLYCOSYLATED A1C: CPT

## 2024-12-14 LAB
EST. AVERAGE GLUCOSE BLD GHB EST-MCNC: 140 MG/DL
HBA1C MFR BLD: 6.5 %

## 2025-01-03 ENCOUNTER — APPOINTMENT (OUTPATIENT)
Dept: PRIMARY CARE | Facility: CLINIC | Age: 60
End: 2025-01-03
Payer: COMMERCIAL

## 2025-01-16 ENCOUNTER — APPOINTMENT (OUTPATIENT)
Dept: UROLOGY | Facility: CLINIC | Age: 60
End: 2025-01-16
Payer: COMMERCIAL

## 2025-01-16 VITALS — SYSTOLIC BLOOD PRESSURE: 123 MMHG | DIASTOLIC BLOOD PRESSURE: 80 MMHG | HEART RATE: 97 BPM

## 2025-01-16 DIAGNOSIS — R39.9 UTI SYMPTOMS: Primary | ICD-10-CM

## 2025-01-16 DIAGNOSIS — R31.9 HEMATURIA, UNSPECIFIED TYPE: ICD-10-CM

## 2025-01-16 LAB
POC APPEARANCE, URINE: CLEAR
POC BILIRUBIN, URINE: NEGATIVE
POC BLOOD, URINE: ABNORMAL
POC COLOR, URINE: YELLOW
POC GLUCOSE, URINE: NEGATIVE MG/DL
POC KETONES, URINE: NEGATIVE MG/DL
POC LEUKOCYTES, URINE: NEGATIVE
POC NITRITE,URINE: NEGATIVE
POC PH, URINE: 5.5 PH
POC PROTEIN, URINE: NEGATIVE MG/DL
POC SPECIFIC GRAVITY, URINE: 1.01
POC UROBILINOGEN, URINE: 0.2 EU/DL

## 2025-01-16 PROCEDURE — G2211 COMPLEX E/M VISIT ADD ON: HCPCS | Performed by: NURSE PRACTITIONER

## 2025-01-16 PROCEDURE — 81003 URINALYSIS AUTO W/O SCOPE: CPT | Performed by: NURSE PRACTITIONER

## 2025-01-16 PROCEDURE — 3079F DIAST BP 80-89 MM HG: CPT | Performed by: NURSE PRACTITIONER

## 2025-01-16 PROCEDURE — 3074F SYST BP LT 130 MM HG: CPT | Performed by: NURSE PRACTITIONER

## 2025-01-16 PROCEDURE — 99203 OFFICE O/P NEW LOW 30 MIN: CPT | Performed by: NURSE PRACTITIONER

## 2025-01-16 NOTE — PROGRESS NOTES
Subjective   Patient ID: Jolly Adame is a 59 y.o. female who presents for recurrent UTI  and Blood in Urine.  Patient presents for evaluation of UTI. Describes 1st UTI in decades in August. Treated with antibiotic without full resolution of symptoms , treated with 2nd course of antibiotics. Culture identified E. Coli.     Developed recurrent symptoms in Nov in the absence of infection with gross hematuria, again treated with antibiotics. Denies history of nephrolithiasis. States this previously happened in Oct 2020 when she lived in [place]. Underwent TURBT at that time which was unremarkable.     Hysterectomy in 1998. Recently , previously  in 2011. Never smoked, previously had significant 2nd had smoke exposure. Denies drug use.     Unremarkable non contrast CT in Nov.         Review of Systems   All other systems reviewed and are negative.      Objective   Physical Exam  Vitals reviewed.     Constitutional: Patient generally appears stated age. Good nutrition. No deformities. Good attention to grooming.  Neck: No neck masses. Good symmetry. No crepitus. Normal thyroid size and consistency with no masses.  Respiratory: Normal respiratory effort. No intercostal retractions. No use of accessory muscles.  Cardiovascular: Examination of the peripheral vascular system reveals no swelling or varicosities with good pulses. Normal extremity temperature, no edema or tenderness.  Abdomen: Examination of the abdomen reveals no masses or tenderness. No hernias detected. Normal liver and spleen size.   Lymphatic: No palpable lymph nodes in the neck, axilla, groin or other locations  Skin: Inspection of the skin reveals no rashes, lesions, ulcers.  Neurologic/Psychiatric: Oriented to time, place and person. Normal mood and affect with no depression, anxiety, or agitation.    Office Visit on 01/16/2025   Component Date Value Ref Range Status    POC Color, Urine 01/16/2025 Yellow  Straw, Yellow, Light-Yellow  Final    POC Appearance, Urine 01/16/2025 Clear  Clear Final    POC Glucose, Urine 01/16/2025 NEGATIVE  NEGATIVE mg/dl Final    POC Bilirubin, Urine 01/16/2025 NEGATIVE  NEGATIVE Final    POC Ketones, Urine 01/16/2025 NEGATIVE  NEGATIVE mg/dl Final    POC Specific Gravity, Urine 01/16/2025 1.015  1.005 - 1.035 Final    POC Blood, Urine 01/16/2025 TRACE-Intact (A)  NEGATIVE Final    POC PH, Urine 01/16/2025 5.5  No Reference Range Established PH Final    POC Protein, Urine 01/16/2025 NEGATIVE  NEGATIVE mg/dl Final    POC Urobilinogen, Urine 01/16/2025 0.2  0.2, 1.0 EU/DL Final    Poc Nitrite, Urine 01/16/2025 NEGATIVE  NEGATIVE Final    POC Leukocytes, Urine 01/16/2025 NEGATIVE  NEGATIVE Final   Lab on 12/13/2024   Component Date Value Ref Range Status    Glucose 12/13/2024 133 (H)  74 - 99 mg/dL Final    Sodium 12/13/2024 137  136 - 145 mmol/L Final    Potassium 12/13/2024 4.1  3.5 - 5.3 mmol/L Final    Chloride 12/13/2024 102  98 - 107 mmol/L Final    Bicarbonate 12/13/2024 29  21 - 32 mmol/L Final    Anion Gap 12/13/2024 10  10 - 20 mmol/L Final    Urea Nitrogen 12/13/2024 13  6 - 23 mg/dL Final    Creatinine 12/13/2024 0.80  0.50 - 1.05 mg/dL Final    eGFR 12/13/2024 86  >60 mL/min/1.73m*2 Final    Calculations of estimated GFR are performed using the 2021 CKD-EPI Study Refit equation without the race variable for the IDMS-Traceable creatinine methods.  https://jasn.asnjournals.org/content/early/2021/09/22/ASN.8438063638    Calcium 12/13/2024 9.3  8.6 - 10.3 mg/dL Final    Albumin 12/13/2024 4.4  3.4 - 5.0 g/dL Final    Alkaline Phosphatase 12/13/2024 108  33 - 110 U/L Final    Total Protein 12/13/2024 6.9  6.4 - 8.2 g/dL Final    AST 12/13/2024 27  9 - 39 U/L Final    Bilirubin, Total 12/13/2024 0.8  0.0 - 1.2 mg/dL Final    ALT 12/13/2024 48 (H)  7 - 45 U/L Final    Patients treated with Sulfasalazine may generate falsely decreased results for ALT.    Color, Urine 12/13/2024 Yellow  Light-Yellow, Yellow,  Dark-Yellow Final    Appearance, Urine 12/13/2024 Clear  Clear Final    Specific Gravity, Urine 12/13/2024 1.014  1.005 - 1.035 Final    pH, Urine 12/13/2024 5.5  5.0, 5.5, 6.0, 6.5, 7.0, 7.5, 8.0 Final    Protein, Urine 12/13/2024 NEGATIVE  NEGATIVE, 10 (TRACE), 20 (TRACE) mg/dL Final    Glucose, Urine 12/13/2024 Normal  Normal mg/dL Final    Blood, Urine 12/13/2024 NEGATIVE  NEGATIVE Final    Ketones, Urine 12/13/2024 NEGATIVE  NEGATIVE mg/dL Final    Bilirubin, Urine 12/13/2024 NEGATIVE  NEGATIVE Final    Urobilinogen, Urine 12/13/2024 Normal  Normal mg/dL Final    Nitrite, Urine 12/13/2024 NEGATIVE  NEGATIVE Final    Leukocyte Esterase, Urine 12/13/2024 NEGATIVE  NEGATIVE Final    Hemoglobin A1C 12/13/2024 6.5 (H)  See comment % Final    Estimated Average Glucose 12/13/2024 140  Not Established mg/dL Final   Office Visit on 11/20/2024   Component Date Value Ref Range Status    POC Color, Urine 11/20/2024 Yellow  Straw, Yellow, Light-Yellow Final    POC Appearance, Urine 11/20/2024 Cloudy (A)  Clear Final    POC Glucose, Urine 11/20/2024 250 (2+) (A)  NEGATIVE mg/dl Final    POC Bilirubin, Urine 11/20/2024 NEGATIVE  NEGATIVE Final    POC Ketones, Urine 11/20/2024 NEGATIVE  NEGATIVE mg/dl Final    POC Specific Gravity, Urine 11/20/2024 >=1.030  1.005 - 1.035 Final    POC Blood, Urine 11/20/2024 NEGATIVE  NEGATIVE Final    POC PH, Urine 11/20/2024 6.0  No Reference Range Established PH Final    POC Protein, Urine 11/20/2024 NEGATIVE  NEGATIVE, 30 (1+) mg/dl Final    POC Urobilinogen, Urine 11/20/2024 0.2  0.2, 1.0 EU/DL Final    Poc Nitrite, Urine 11/20/2024 NEGATIVE  NEGATIVE Final    POC Leukocytes, Urine 11/20/2024 NEGATIVE  NEGATIVE Final    Color, Urine 11/20/2024 Orange (N)  Light-Yellow, Yellow, Dark-Yellow Final    Appearance, Urine 11/20/2024 Ex.Turbid (N)  Clear Final    Specific Gravity, Urine 11/20/2024 1.029  1.005 - 1.035 Final    pH, Urine 11/20/2024 5.5  5.0, 5.5, 6.0, 6.5, 7.0, 7.5, 8.0 Final     Protein, Urine 11/20/2024 10 (TRACE)  NEGATIVE, 10 (TRACE), 20 (TRACE) mg/dL Final    Glucose, Urine 11/20/2024 1000 (4+) (A)  Normal mg/dL Final    Blood, Urine 11/20/2024 NEGATIVE  NEGATIVE Final    Ketones, Urine 11/20/2024 NEGATIVE  NEGATIVE mg/dL Final    Bilirubin, Urine 11/20/2024 NEGATIVE  NEGATIVE Final    Urobilinogen, Urine 11/20/2024 Normal  Normal mg/dL Final    Nitrite, Urine 11/20/2024 NEGATIVE  NEGATIVE Final    Leukocyte Esterase, Urine 11/20/2024 75 Aston/µL (A)  NEGATIVE Final    Urine Culture 11/20/2024 No growth   Final    WBC, Urine 11/20/2024 NONE  1-5, NONE /HPF Final    RBC, Urine 11/20/2024 NONE  NONE, 1-2, 3-5 /HPF Final    Amorphous Crystals, Urine 11/20/2024 4+ (A)  NONE, 1+, 2+ /HPF Final   Admission on 11/10/2024, Discharged on 11/11/2024   Component Date Value Ref Range Status    Color, Urine 11/10/2024 Dark-Brown (N)  Light-Yellow, Yellow, Dark-Yellow Final    Appearance, Urine 11/10/2024 Ex.Turbid (N)  Clear Final    Specific Gravity, Urine 11/10/2024 1.025  1.005 - 1.035 Final    pH, Urine 11/10/2024 5.5  5.0, 5.5, 6.0, 6.5, 7.0, 7.5, 8.0 Final    Protein, Urine 11/10/2024 100 (2+) (A)  NEGATIVE, 10 (TRACE), 20 (TRACE) mg/dL Final    Glucose, Urine 11/10/2024 Normal  Normal mg/dL Final    Blood, Urine 11/10/2024 OVER (3+) (A)  NEGATIVE Final    Ketones, Urine 11/10/2024 NEGATIVE  NEGATIVE mg/dL Final    Bilirubin, Urine 11/10/2024 NEGATIVE  NEGATIVE Final    Urobilinogen, Urine 11/10/2024 3 (1+) (A)  Normal mg/dL Final    Some pigments and medications may cause a false positive urobilinogen.    Nitrite, Urine 11/10/2024 1+ (A)  NEGATIVE Final    Leukocyte Esterase, Urine 11/10/2024 500 Aston/µL (A)  NEGATIVE Final    Extra Tube 11/10/2024 Hold for add-ons.   Final    Auto resulted.    WBC, Urine 11/10/2024 >50 (A)  1-5, NONE /HPF Final    RBC, Urine 11/10/2024 >20 (A)  NONE, 1-2, 3-5 /HPF Final    Squamous Epithelial Cells, Urine 11/10/2024 1-9 (SPARSE)  Reference range not  established. /HPF Final    Transitional Epithelial Cells, Uri* 11/10/2024 1-2 (FEW)  Reference range not established. /HPF Final    Bacteria, Urine 11/10/2024 2+ (A)  NONE SEEN /HPF Final    Mucus, Urine 11/10/2024 1+  Reference range not established. /LPF Final    Urine Culture 11/10/2024 >100,000 Escherichia coli (A)   Final   Office Visit on 11/10/2024   Component Date Value Ref Range Status    POC Color, Urine 11/10/2024 Yellow  Straw, Yellow, Light-Yellow Final    POC Appearance, Urine 11/10/2024 Clear  Clear Final    POC Glucose, Urine 11/10/2024 250 (2+) (A)  NEGATIVE mg/dl Final    POC Bilirubin, Urine 11/10/2024 NEGATIVE  NEGATIVE Final    POC Ketones, Urine 11/10/2024 NEGATIVE  NEGATIVE mg/dl Final    POC Specific Gravity, Urine 11/10/2024 1.010  1.005 - 1.035 Final    POC Blood, Urine 11/10/2024 NEGATIVE  NEGATIVE Final    POC PH, Urine 11/10/2024 6.0  No Reference Range Established PH Final    POC Protein, Urine 11/10/2024 NEGATIVE  NEGATIVE, 30 (1+) mg/dl Final    POC Urobilinogen, Urine 11/10/2024 0.2  0.2, 1.0 EU/DL Final    Poc Nitrite, Urine 11/10/2024 NEGATIVE  NEGATIVE Final    POC Leukocytes, Urine 11/10/2024 NEGATIVE  NEGATIVE Final    POC Fingerstick Blood Glucose 11/10/2024 215 (A)  70 - 100 mg/dl Final    Urine Culture 11/10/2024 Multiple organisms present, probable contamination. Repeat culture if clinically indicated. (A)   Final         Assessment/Plan   Diagnoses and all orders for this visit:  UTI symptoms  Hematuria, unspecified type  -     Referral to Urology  -     POCT UA Automated manually resulted  -     Post-Void Residual  -     Urinalysis with Reflex Culture and Microscopic; Future  -     Non-gynecologic cytology; Future  -     Cystourethroscopy; Future    Discussed further evaluation given recurrent gross hematuria. Reviewed unremarkable CT. Urine to be sent for culture and cytology. Will plan for follow up cysto. Patient is in agreement with plan.          Brittani Patricio,  APRN-CNP 01/16/25 5:40 PM

## 2025-02-04 DIAGNOSIS — F41.9 ANXIETY: ICD-10-CM

## 2025-02-04 RX ORDER — NORTRIPTYLINE HYDROCHLORIDE 50 MG/1
CAPSULE ORAL
Qty: 60 CAPSULE | Refills: 11 | Status: SHIPPED | OUTPATIENT
Start: 2025-02-04

## 2025-02-04 NOTE — TELEPHONE ENCOUNTER
"Refill Request      Last OV with PCP 11/20/2024     Future Appointments       Date / Time Provider Department Dept Phone    2/19/2025 3:00 PM (Arrive by 2:45 PM) Sabina Vitale, APRN-CNP  [facility] [phone]    3/13/2025 3:00 PM Av Hunter MD; UROLOGY [id number] PROCEDURE ROOM 2 [facility] [phone]    7/11/2025 1:00 PM Lynn Barrios, APRN-CNP [facility] [phone]          Lab Results   Component Value Date    HGBA1C 6.5 (H) 12/13/2024     Lab Results   Component Value Date    CHOL 150 10/02/2024    CHOL 126 02/14/2024    CHOL 146 09/13/2023     Lab Results   Component Value Date    HDL 15.8 10/02/2024    HDL 20.9 02/14/2024    HDL 22.8 (A) 09/13/2023     Lab Results   Component Value Date    LDLCALC 69 10/02/2024    LDLCALC 66 02/14/2024     Lab Results   Component Value Date    TRIG 326 (H) 10/02/2024    TRIG 195 (H) 02/14/2024    TRIG 192 (H) 09/13/2023     No components found for: \"CHOLHDL\"  Lab Results   Component Value Date    TSH 3.09 10/02/2024     Lab Results   Component Value Date    GLUCOSE 133 (H) 12/13/2024    CALCIUM 9.3 12/13/2024     12/13/2024    K 4.1 12/13/2024    CO2 29 12/13/2024     12/13/2024    BUN 13 12/13/2024    CREATININE 0.80 12/13/2024       "

## 2025-02-19 ENCOUNTER — APPOINTMENT (OUTPATIENT)
Dept: PRIMARY CARE | Facility: CLINIC | Age: 60
End: 2025-02-19
Payer: COMMERCIAL

## 2025-02-22 ENCOUNTER — HOSPITAL ENCOUNTER (INPATIENT)
Facility: HOSPITAL | Age: 60
DRG: 386 | End: 2025-02-22
Attending: EMERGENCY MEDICINE
Payer: COMMERCIAL

## 2025-02-22 ENCOUNTER — APPOINTMENT (OUTPATIENT)
Dept: RADIOLOGY | Facility: HOSPITAL | Age: 60
DRG: 386 | End: 2025-02-22
Payer: COMMERCIAL

## 2025-02-22 DIAGNOSIS — K52.9 ENTEROCOLITIS: ICD-10-CM

## 2025-02-22 DIAGNOSIS — K51.911: Primary | ICD-10-CM

## 2025-02-22 DIAGNOSIS — R19.7 BLOODY DIARRHEA: ICD-10-CM

## 2025-02-22 LAB
ALBUMIN SERPL BCP-MCNC: 4.5 G/DL (ref 3.4–5)
ALP SERPL-CCNC: 92 U/L (ref 33–110)
ALT SERPL W P-5'-P-CCNC: 69 U/L (ref 7–45)
ANION GAP SERPL CALC-SCNC: 18 MMOL/L (ref 10–20)
APPEARANCE UR: CLEAR
AST SERPL W P-5'-P-CCNC: 63 U/L (ref 9–39)
BASOPHILS # BLD AUTO: 0.04 X10*3/UL (ref 0–0.1)
BASOPHILS NFR BLD AUTO: 0.2 %
BILIRUB SERPL-MCNC: 1 MG/DL (ref 0–1.2)
BILIRUB UR STRIP.AUTO-MCNC: NEGATIVE MG/DL
BUN SERPL-MCNC: 17 MG/DL (ref 6–23)
CALCIUM SERPL-MCNC: 9.7 MG/DL (ref 8.6–10.3)
CHLORIDE SERPL-SCNC: 100 MMOL/L (ref 98–107)
CO2 SERPL-SCNC: 20 MMOL/L (ref 21–32)
COLOR UR: YELLOW
CREAT SERPL-MCNC: 0.93 MG/DL (ref 0.5–1.05)
EGFRCR SERPLBLD CKD-EPI 2021: 71 ML/MIN/1.73M*2
EOSINOPHIL # BLD AUTO: 0.01 X10*3/UL (ref 0–0.7)
EOSINOPHIL NFR BLD AUTO: 0.1 %
ERYTHROCYTE [DISTWIDTH] IN BLOOD BY AUTOMATED COUNT: 13.2 % (ref 11.5–14.5)
FLUAV RNA RESP QL NAA+PROBE: NOT DETECTED
FLUBV RNA RESP QL NAA+PROBE: NOT DETECTED
GLUCOSE SERPL-MCNC: 297 MG/DL (ref 74–99)
GLUCOSE UR STRIP.AUTO-MCNC: ABNORMAL MG/DL
HCT VFR BLD AUTO: 43.1 % (ref 36–46)
HGB BLD-MCNC: 14.4 G/DL (ref 12–16)
IMM GRANULOCYTES # BLD AUTO: 0.11 X10*3/UL (ref 0–0.7)
IMM GRANULOCYTES NFR BLD AUTO: 0.6 % (ref 0–0.9)
INR PPP: 1.1 (ref 0.9–1.1)
KETONES UR STRIP.AUTO-MCNC: ABNORMAL MG/DL
LACTATE SERPL-SCNC: 3.2 MMOL/L (ref 0.4–2)
LEUKOCYTE ESTERASE UR QL STRIP.AUTO: ABNORMAL
LIPASE SERPL-CCNC: 19 U/L (ref 9–82)
LYMPHOCYTES # BLD AUTO: 1.47 X10*3/UL (ref 1.2–4.8)
LYMPHOCYTES NFR BLD AUTO: 7.7 %
MCH RBC QN AUTO: 29.9 PG (ref 26–34)
MCHC RBC AUTO-ENTMCNC: 33.4 G/DL (ref 32–36)
MCV RBC AUTO: 90 FL (ref 80–100)
MONOCYTES # BLD AUTO: 1.02 X10*3/UL (ref 0.1–1)
MONOCYTES NFR BLD AUTO: 5.4 %
NEUTROPHILS # BLD AUTO: 16.35 X10*3/UL (ref 1.2–7.7)
NEUTROPHILS NFR BLD AUTO: 86 %
NITRITE UR QL STRIP.AUTO: NEGATIVE
NRBC BLD-RTO: 0 /100 WBCS (ref 0–0)
PH UR STRIP.AUTO: 5 [PH]
PLATELET # BLD AUTO: 305 X10*3/UL (ref 150–450)
POTASSIUM SERPL-SCNC: 5 MMOL/L (ref 3.5–5.3)
PROT SERPL-MCNC: 7.3 G/DL (ref 6.4–8.2)
PROT UR STRIP.AUTO-MCNC: NEGATIVE MG/DL
PROTHROMBIN TIME: 11.9 SECONDS (ref 9.8–12.8)
RBC # BLD AUTO: 4.81 X10*6/UL (ref 4–5.2)
RBC # UR STRIP.AUTO: ABNORMAL MG/DL
RBC #/AREA URNS AUTO: >20 /HPF
RSV RNA RESP QL NAA+PROBE: NOT DETECTED
SARS-COV-2 RNA RESP QL NAA+PROBE: NOT DETECTED
SODIUM SERPL-SCNC: 133 MMOL/L (ref 136–145)
SP GR UR STRIP.AUTO: 1.02
SQUAMOUS #/AREA URNS AUTO: ABNORMAL /HPF
UROBILINOGEN UR STRIP.AUTO-MCNC: NORMAL MG/DL
WBC # BLD AUTO: 19 X10*3/UL (ref 4.4–11.3)
WBC #/AREA URNS AUTO: ABNORMAL /HPF

## 2025-02-22 PROCEDURE — 83690 ASSAY OF LIPASE: CPT | Performed by: EMERGENCY MEDICINE

## 2025-02-22 PROCEDURE — 99285 EMERGENCY DEPT VISIT HI MDM: CPT | Mod: 25 | Performed by: EMERGENCY MEDICINE

## 2025-02-22 PROCEDURE — 87637 SARSCOV2&INF A&B&RSV AMP PRB: CPT | Performed by: EMERGENCY MEDICINE

## 2025-02-22 PROCEDURE — 36415 COLL VENOUS BLD VENIPUNCTURE: CPT | Performed by: EMERGENCY MEDICINE

## 2025-02-22 PROCEDURE — 80053 COMPREHEN METABOLIC PANEL: CPT | Performed by: EMERGENCY MEDICINE

## 2025-02-22 PROCEDURE — 96375 TX/PRO/DX INJ NEW DRUG ADDON: CPT

## 2025-02-22 PROCEDURE — 74176 CT ABD & PELVIS W/O CONTRAST: CPT

## 2025-02-22 PROCEDURE — 85025 COMPLETE CBC W/AUTO DIFF WBC: CPT | Performed by: EMERGENCY MEDICINE

## 2025-02-22 PROCEDURE — 96361 HYDRATE IV INFUSION ADD-ON: CPT

## 2025-02-22 PROCEDURE — 83605 ASSAY OF LACTIC ACID: CPT | Performed by: EMERGENCY MEDICINE

## 2025-02-22 PROCEDURE — 2500000004 HC RX 250 GENERAL PHARMACY W/ HCPCS (ALT 636 FOR OP/ED)

## 2025-02-22 PROCEDURE — 87040 BLOOD CULTURE FOR BACTERIA: CPT | Mod: STJLAB | Performed by: EMERGENCY MEDICINE

## 2025-02-22 PROCEDURE — 81001 URINALYSIS AUTO W/SCOPE: CPT | Performed by: EMERGENCY MEDICINE

## 2025-02-22 PROCEDURE — 85610 PROTHROMBIN TIME: CPT | Performed by: EMERGENCY MEDICINE

## 2025-02-22 PROCEDURE — 87086 URINE CULTURE/COLONY COUNT: CPT | Mod: STJLAB | Performed by: EMERGENCY MEDICINE

## 2025-02-22 PROCEDURE — 74176 CT ABD & PELVIS W/O CONTRAST: CPT | Mod: FOREIGN READ | Performed by: STUDENT IN AN ORGANIZED HEALTH CARE EDUCATION/TRAINING PROGRAM

## 2025-02-22 PROCEDURE — 85652 RBC SED RATE AUTOMATED: CPT

## 2025-02-22 PROCEDURE — 86140 C-REACTIVE PROTEIN: CPT

## 2025-02-22 RX ORDER — ONDANSETRON HYDROCHLORIDE 2 MG/ML
4 INJECTION, SOLUTION INTRAVENOUS ONCE
Status: COMPLETED | OUTPATIENT
Start: 2025-02-22 | End: 2025-02-22

## 2025-02-22 RX ORDER — MORPHINE SULFATE 4 MG/ML
4 INJECTION, SOLUTION INTRAMUSCULAR; INTRAVENOUS ONCE
Status: COMPLETED | OUTPATIENT
Start: 2025-02-22 | End: 2025-02-22

## 2025-02-22 RX ADMIN — ONDANSETRON 4 MG: 2 INJECTION INTRAMUSCULAR; INTRAVENOUS at 21:04

## 2025-02-22 RX ADMIN — HYDROMORPHONE HYDROCHLORIDE 0.5 MG: 1 INJECTION, SOLUTION INTRAMUSCULAR; INTRAVENOUS; SUBCUTANEOUS at 23:51

## 2025-02-22 RX ADMIN — SODIUM CHLORIDE, POTASSIUM CHLORIDE, SODIUM LACTATE AND CALCIUM CHLORIDE 1000 ML: 600; 310; 30; 20 INJECTION, SOLUTION INTRAVENOUS at 21:05

## 2025-02-22 RX ADMIN — MORPHINE SULFATE 4 MG: 4 INJECTION, SOLUTION INTRAMUSCULAR; INTRAVENOUS at 21:04

## 2025-02-22 ASSESSMENT — PAIN SCALES - GENERAL
PAINLEVEL_OUTOF10: 8
PAINLEVEL_OUTOF10: 5 - MODERATE PAIN
PAINLEVEL_OUTOF10: 10 - WORST POSSIBLE PAIN
PAINLEVEL_OUTOF10: 10 - WORST POSSIBLE PAIN

## 2025-02-22 ASSESSMENT — PAIN DESCRIPTION - PAIN TYPE
TYPE: ACUTE PAIN
TYPE: ACUTE PAIN

## 2025-02-22 ASSESSMENT — PAIN DESCRIPTION - DESCRIPTORS
DESCRIPTORS: CRAMPING
DESCRIPTORS: CRAMPING

## 2025-02-22 ASSESSMENT — PAIN - FUNCTIONAL ASSESSMENT
PAIN_FUNCTIONAL_ASSESSMENT: 0-10
PAIN_FUNCTIONAL_ASSESSMENT: 0-10

## 2025-02-22 ASSESSMENT — PAIN DESCRIPTION - LOCATION
LOCATION: ABDOMEN

## 2025-02-22 ASSESSMENT — PAIN DESCRIPTION - ORIENTATION: ORIENTATION: LOWER

## 2025-02-22 ASSESSMENT — PAIN DESCRIPTION - FREQUENCY: FREQUENCY: CONSTANT/CONTINUOUS

## 2025-02-22 ASSESSMENT — PAIN SCALES - PAIN ASSESSMENT IN ADVANCED DEMENTIA (PAINAD): TOTALSCORE: MEDICATION (SEE MAR)

## 2025-02-22 ASSESSMENT — PAIN DESCRIPTION - PROGRESSION: CLINICAL_PROGRESSION: NOT CHANGED

## 2025-02-22 ASSESSMENT — PAIN DESCRIPTION - ONSET: ONSET: AWAKENED FROM SLEEP

## 2025-02-23 VITALS
HEIGHT: 62 IN | DIASTOLIC BLOOD PRESSURE: 77 MMHG | RESPIRATION RATE: 16 BRPM | WEIGHT: 159.83 LBS | SYSTOLIC BLOOD PRESSURE: 140 MMHG | BODY MASS INDEX: 29.41 KG/M2 | OXYGEN SATURATION: 95 % | HEART RATE: 108 BPM | TEMPERATURE: 96.4 F

## 2025-02-23 PROBLEM — K51.911: Status: ACTIVE | Noted: 2025-02-23

## 2025-02-23 LAB
ALBUMIN SERPL BCP-MCNC: 4.1 G/DL (ref 3.4–5)
ALP SERPL-CCNC: 117 U/L (ref 33–110)
ALT SERPL W P-5'-P-CCNC: 191 U/L (ref 7–45)
ANION GAP SERPL CALC-SCNC: 13 MMOL/L (ref 10–20)
AST SERPL W P-5'-P-CCNC: 221 U/L (ref 9–39)
BACTERIA BLD CULT: NORMAL
BACTERIA BLD CULT: NORMAL
BILIRUB SERPL-MCNC: 1.7 MG/DL (ref 0–1.2)
BUN SERPL-MCNC: 14 MG/DL (ref 6–23)
CALCIUM SERPL-MCNC: 9.2 MG/DL (ref 8.6–10.3)
CHLORIDE SERPL-SCNC: 99 MMOL/L (ref 98–107)
CO2 SERPL-SCNC: 26 MMOL/L (ref 21–32)
CREAT SERPL-MCNC: 0.69 MG/DL (ref 0.5–1.05)
CRP SERPL-MCNC: 1.28 MG/DL
EGFRCR SERPLBLD CKD-EPI 2021: >90 ML/MIN/1.73M*2
ERYTHROCYTE [DISTWIDTH] IN BLOOD BY AUTOMATED COUNT: 13.4 % (ref 11.5–14.5)
ERYTHROCYTE [SEDIMENTATION RATE] IN BLOOD BY WESTERGREN METHOD: 10 MM/H (ref 0–30)
FERRITIN SERPL-MCNC: 744 NG/ML (ref 8–150)
GLUCOSE SERPL-MCNC: 236 MG/DL (ref 74–99)
HAV IGM SER QL: NONREACTIVE
HAV IGM SER QL: NONREACTIVE
HBV CORE IGM SER QL: NONREACTIVE
HBV SURFACE AG SERPL QL IA: NONREACTIVE
HCT VFR BLD AUTO: 40.6 % (ref 36–46)
HCV AB SER QL: NONREACTIVE
HGB BLD-MCNC: 13.1 G/DL (ref 12–16)
HOLD SPECIMEN: NORMAL
LACTATE SERPL-SCNC: 1.9 MMOL/L (ref 0.4–2)
MAGNESIUM SERPL-MCNC: 1.77 MG/DL (ref 1.6–2.4)
MCH RBC QN AUTO: 28.9 PG (ref 26–34)
MCHC RBC AUTO-ENTMCNC: 32.3 G/DL (ref 32–36)
MCV RBC AUTO: 89 FL (ref 80–100)
NRBC BLD-RTO: 0 /100 WBCS (ref 0–0)
PHOSPHATE SERPL-MCNC: 2.7 MG/DL (ref 2.5–4.9)
PLATELET # BLD AUTO: 296 X10*3/UL (ref 150–450)
POTASSIUM SERPL-SCNC: 4.1 MMOL/L (ref 3.5–5.3)
PROT SERPL-MCNC: 6.6 G/DL (ref 6.4–8.2)
RBC # BLD AUTO: 4.54 X10*6/UL (ref 4–5.2)
SODIUM SERPL-SCNC: 134 MMOL/L (ref 136–145)
WBC # BLD AUTO: 12.7 X10*3/UL (ref 4.4–11.3)

## 2025-02-23 PROCEDURE — 2500000004 HC RX 250 GENERAL PHARMACY W/ HCPCS (ALT 636 FOR OP/ED)

## 2025-02-23 PROCEDURE — 80074 ACUTE HEPATITIS PANEL: CPT | Mod: STJLAB

## 2025-02-23 PROCEDURE — 83735 ASSAY OF MAGNESIUM: CPT

## 2025-02-23 PROCEDURE — 85027 COMPLETE CBC AUTOMATED: CPT

## 2025-02-23 PROCEDURE — 99223 1ST HOSP IP/OBS HIGH 75: CPT | Performed by: STUDENT IN AN ORGANIZED HEALTH CARE EDUCATION/TRAINING PROGRAM

## 2025-02-23 PROCEDURE — 2500000004 HC RX 250 GENERAL PHARMACY W/ HCPCS (ALT 636 FOR OP/ED): Mod: JZ

## 2025-02-23 PROCEDURE — 80053 COMPREHEN METABOLIC PANEL: CPT

## 2025-02-23 PROCEDURE — 84100 ASSAY OF PHOSPHORUS: CPT

## 2025-02-23 PROCEDURE — 2500000001 HC RX 250 WO HCPCS SELF ADMINISTERED DRUGS (ALT 637 FOR MEDICARE OP): Performed by: INTERNAL MEDICINE

## 2025-02-23 PROCEDURE — 96376 TX/PRO/DX INJ SAME DRUG ADON: CPT

## 2025-02-23 PROCEDURE — 96365 THER/PROPH/DIAG IV INF INIT: CPT

## 2025-02-23 PROCEDURE — 96375 TX/PRO/DX INJ NEW DRUG ADDON: CPT

## 2025-02-23 PROCEDURE — 2500000004 HC RX 250 GENERAL PHARMACY W/ HCPCS (ALT 636 FOR OP/ED): Performed by: EMERGENCY MEDICINE

## 2025-02-23 PROCEDURE — 82728 ASSAY OF FERRITIN: CPT

## 2025-02-23 PROCEDURE — 99223 1ST HOSP IP/OBS HIGH 75: CPT

## 2025-02-23 PROCEDURE — 87506 IADNA-DNA/RNA PROBE TQ 6-11: CPT | Mod: STJLAB

## 2025-02-23 PROCEDURE — 36415 COLL VENOUS BLD VENIPUNCTURE: CPT

## 2025-02-23 PROCEDURE — 87328 CRYPTOSPORIDIUM AG IA: CPT

## 2025-02-23 PROCEDURE — 1100000001 HC PRIVATE ROOM DAILY

## 2025-02-23 PROCEDURE — 83605 ASSAY OF LACTIC ACID: CPT | Performed by: EMERGENCY MEDICINE

## 2025-02-23 PROCEDURE — 86709 HEPATITIS A IGM ANTIBODY: CPT | Mod: STJLAB | Performed by: STUDENT IN AN ORGANIZED HEALTH CARE EDUCATION/TRAINING PROGRAM

## 2025-02-23 PROCEDURE — 87329 GIARDIA AG IA: CPT

## 2025-02-23 PROCEDURE — 2500000002 HC RX 250 W HCPCS SELF ADMINISTERED DRUGS (ALT 637 FOR MEDICARE OP, ALT 636 FOR OP/ED): Performed by: INTERNAL MEDICINE

## 2025-02-23 PROCEDURE — 36415 COLL VENOUS BLD VENIPUNCTURE: CPT | Performed by: EMERGENCY MEDICINE

## 2025-02-23 PROCEDURE — 2500000004 HC RX 250 GENERAL PHARMACY W/ HCPCS (ALT 636 FOR OP/ED): Mod: JZ | Performed by: INTERNAL MEDICINE

## 2025-02-23 RX ORDER — DILTIAZEM HYDROCHLORIDE 120 MG/1
120 CAPSULE, COATED, EXTENDED RELEASE ORAL
Status: DISCONTINUED | OUTPATIENT
Start: 2025-02-23 | End: 2025-02-25 | Stop reason: HOSPADM

## 2025-02-23 RX ORDER — METOPROLOL SUCCINATE 25 MG/1
25 TABLET, EXTENDED RELEASE ORAL NIGHTLY
Status: DISCONTINUED | OUTPATIENT
Start: 2025-02-23 | End: 2025-02-25 | Stop reason: HOSPADM

## 2025-02-23 RX ORDER — PANTOPRAZOLE SODIUM 20 MG/1
20 TABLET, DELAYED RELEASE ORAL
Status: DISCONTINUED | OUTPATIENT
Start: 2025-02-23 | End: 2025-02-25 | Stop reason: HOSPADM

## 2025-02-23 RX ORDER — NORTRIPTYLINE HYDROCHLORIDE 25 MG/1
50 CAPSULE ORAL NIGHTLY
Status: DISCONTINUED | OUTPATIENT
Start: 2025-02-23 | End: 2025-02-25 | Stop reason: HOSPADM

## 2025-02-23 RX ORDER — ONDANSETRON HYDROCHLORIDE 2 MG/ML
INJECTION, SOLUTION INTRAVENOUS
Status: COMPLETED
Start: 2025-02-23 | End: 2025-02-23

## 2025-02-23 RX ORDER — HYDROMORPHONE HYDROCHLORIDE 1 MG/ML
0.8 INJECTION, SOLUTION INTRAMUSCULAR; INTRAVENOUS; SUBCUTANEOUS EVERY 4 HOURS PRN
Status: DISCONTINUED | OUTPATIENT
Start: 2025-02-23 | End: 2025-02-25 | Stop reason: HOSPADM

## 2025-02-23 RX ORDER — VALSARTAN 80 MG/1
80 TABLET ORAL NIGHTLY
Status: DISCONTINUED | OUTPATIENT
Start: 2025-02-23 | End: 2025-02-25 | Stop reason: HOSPADM

## 2025-02-23 RX ORDER — BUSPIRONE HYDROCHLORIDE 15 MG/1
7.5 TABLET ORAL DAILY
Status: DISCONTINUED | OUTPATIENT
Start: 2025-02-23 | End: 2025-02-25 | Stop reason: HOSPADM

## 2025-02-23 RX ORDER — METRONIDAZOLE 500 MG/100ML
500 INJECTION, SOLUTION INTRAVENOUS EVERY 8 HOURS
Status: DISCONTINUED | OUTPATIENT
Start: 2025-02-23 | End: 2025-02-23

## 2025-02-23 RX ORDER — ONDANSETRON HYDROCHLORIDE 2 MG/ML
4 INJECTION, SOLUTION INTRAVENOUS ONCE
Status: COMPLETED | OUTPATIENT
Start: 2025-02-23 | End: 2025-02-23

## 2025-02-23 RX ORDER — SIMVASTATIN 20 MG/1
20 TABLET, FILM COATED ORAL
Status: DISCONTINUED | OUTPATIENT
Start: 2025-02-23 | End: 2025-02-25 | Stop reason: HOSPADM

## 2025-02-23 RX ORDER — PREDNISONE 50 MG/1
50 TABLET ORAL ONCE
Status: DISCONTINUED | OUTPATIENT
Start: 2025-02-23 | End: 2025-02-23

## 2025-02-23 RX ORDER — MESALAMINE 1.2 G/1
1.2 TABLET, DELAYED RELEASE ORAL
Status: DISCONTINUED | OUTPATIENT
Start: 2025-02-23 | End: 2025-02-25 | Stop reason: HOSPADM

## 2025-02-23 RX ORDER — SODIUM CHLORIDE, SODIUM LACTATE, POTASSIUM CHLORIDE, CALCIUM CHLORIDE 600; 310; 30; 20 MG/100ML; MG/100ML; MG/100ML; MG/100ML
75 INJECTION, SOLUTION INTRAVENOUS CONTINUOUS
Status: ACTIVE | OUTPATIENT
Start: 2025-02-23 | End: 2025-02-23

## 2025-02-23 RX ORDER — ONDANSETRON HYDROCHLORIDE 2 MG/ML
4 INJECTION, SOLUTION INTRAVENOUS EVERY 4 HOURS PRN
Status: DISCONTINUED | OUTPATIENT
Start: 2025-02-23 | End: 2025-02-25 | Stop reason: HOSPADM

## 2025-02-23 RX ORDER — ACETAMINOPHEN 500 MG
10 TABLET ORAL NIGHTLY
Status: DISCONTINUED | OUTPATIENT
Start: 2025-02-23 | End: 2025-02-25 | Stop reason: HOSPADM

## 2025-02-23 RX ADMIN — Medication 10 MG: at 21:59

## 2025-02-23 RX ADMIN — METOPROLOL SUCCINATE 25 MG: 25 TABLET, EXTENDED RELEASE ORAL at 21:59

## 2025-02-23 RX ADMIN — METHYLPREDNISOLONE SODIUM SUCCINATE 40 MG: 40 INJECTION, POWDER, FOR SOLUTION INTRAMUSCULAR; INTRAVENOUS at 02:57

## 2025-02-23 RX ADMIN — PIPERACILLIN SODIUM AND TAZOBACTAM SODIUM 3.38 G: 3; .375 INJECTION, SOLUTION INTRAVENOUS at 15:37

## 2025-02-23 RX ADMIN — PANTOPRAZOLE SODIUM 20 MG: 20 TABLET, DELAYED RELEASE ORAL at 12:23

## 2025-02-23 RX ADMIN — VALSARTAN 80 MG: 80 TABLET, FILM COATED ORAL at 21:59

## 2025-02-23 RX ADMIN — PIPERACILLIN SODIUM AND TAZOBACTAM SODIUM 3.38 G: 3; .375 INJECTION, SOLUTION INTRAVENOUS at 23:14

## 2025-02-23 RX ADMIN — ONDANSETRON HYDROCHLORIDE 4 MG: 2 INJECTION, SOLUTION INTRAVENOUS at 00:09

## 2025-02-23 RX ADMIN — SIMVASTATIN 20 MG: 20 TABLET, FILM COATED ORAL at 12:22

## 2025-02-23 RX ADMIN — METHYLPREDNISOLONE SODIUM SUCCINATE 40 MG: 40 INJECTION, POWDER, FOR SOLUTION INTRAMUSCULAR; INTRAVENOUS at 15:37

## 2025-02-23 RX ADMIN — PIPERACILLIN SODIUM AND TAZOBACTAM SODIUM 3.38 G: 3; .375 INJECTION, SOLUTION INTRAVENOUS at 08:37

## 2025-02-23 RX ADMIN — DILTIAZEM HYDROCHLORIDE 120 MG: 120 CAPSULE, COATED, EXTENDED RELEASE ORAL at 12:22

## 2025-02-23 RX ADMIN — SODIUM CHLORIDE, POTASSIUM CHLORIDE, SODIUM LACTATE AND CALCIUM CHLORIDE 75 ML/HR: 600; 310; 30; 20 INJECTION, SOLUTION INTRAVENOUS at 02:57

## 2025-02-23 RX ADMIN — MESALAMINE 1.2 G: 1.2 TABLET, DELAYED RELEASE ORAL at 21:59

## 2025-02-23 RX ADMIN — PIPERACILLIN SODIUM AND TAZOBACTAM SODIUM 3.38 G: 3; .375 INJECTION, SOLUTION INTRAVENOUS at 00:10

## 2025-02-23 RX ADMIN — BUSPIRONE HYDROCHLORIDE 7.5 MG: 15 TABLET ORAL at 10:26

## 2025-02-23 RX ADMIN — ONDANSETRON 4 MG: 2 INJECTION INTRAMUSCULAR; INTRAVENOUS at 00:09

## 2025-02-23 SDOH — ECONOMIC STABILITY: FOOD INSECURITY: WITHIN THE PAST 12 MONTHS, THE FOOD YOU BOUGHT JUST DIDN'T LAST AND YOU DIDN'T HAVE MONEY TO GET MORE.: NEVER TRUE

## 2025-02-23 SDOH — SOCIAL STABILITY: SOCIAL INSECURITY
WITHIN THE LAST YEAR, HAVE YOU BEEN KICKED, HIT, SLAPPED, OR OTHERWISE PHYSICALLY HURT BY YOUR PARTNER OR EX-PARTNER?: NO

## 2025-02-23 SDOH — SOCIAL STABILITY: SOCIAL INSECURITY: ABUSE: ADULT

## 2025-02-23 SDOH — ECONOMIC STABILITY: HOUSING INSECURITY: IN THE PAST 12 MONTHS, HOW MANY TIMES HAVE YOU MOVED WHERE YOU WERE LIVING?: 0

## 2025-02-23 SDOH — ECONOMIC STABILITY: FOOD INSECURITY: WITHIN THE PAST 12 MONTHS, YOU WORRIED THAT YOUR FOOD WOULD RUN OUT BEFORE YOU GOT THE MONEY TO BUY MORE.: NEVER TRUE

## 2025-02-23 SDOH — ECONOMIC STABILITY: HOUSING INSECURITY: IN THE LAST 12 MONTHS, WAS THERE A TIME WHEN YOU WERE NOT ABLE TO PAY THE MORTGAGE OR RENT ON TIME?: NO

## 2025-02-23 SDOH — ECONOMIC STABILITY: INCOME INSECURITY: IN THE PAST 12 MONTHS HAS THE ELECTRIC, GAS, OIL, OR WATER COMPANY THREATENED TO SHUT OFF SERVICES IN YOUR HOME?: NO

## 2025-02-23 SDOH — ECONOMIC STABILITY: TRANSPORTATION INSECURITY: IN THE PAST 12 MONTHS, HAS LACK OF TRANSPORTATION KEPT YOU FROM MEDICAL APPOINTMENTS OR FROM GETTING MEDICATIONS?: NO

## 2025-02-23 SDOH — SOCIAL STABILITY: SOCIAL INSECURITY
WITHIN THE LAST YEAR, HAVE YOU BEEN RAPED OR FORCED TO HAVE ANY KIND OF SEXUAL ACTIVITY BY YOUR PARTNER OR EX-PARTNER?: NO

## 2025-02-23 SDOH — SOCIAL STABILITY: SOCIAL INSECURITY: DO YOU FEEL ANYONE HAS EXPLOITED OR TAKEN ADVANTAGE OF YOU FINANCIALLY OR OF YOUR PERSONAL PROPERTY?: NO

## 2025-02-23 SDOH — SOCIAL STABILITY: SOCIAL INSECURITY: WITHIN THE LAST YEAR, HAVE YOU BEEN AFRAID OF YOUR PARTNER OR EX-PARTNER?: NO

## 2025-02-23 SDOH — SOCIAL STABILITY: SOCIAL INSECURITY: ARE YOU OR HAVE YOU BEEN THREATENED OR ABUSED PHYSICALLY, EMOTIONALLY, OR SEXUALLY BY ANYONE?: NO

## 2025-02-23 SDOH — SOCIAL STABILITY: SOCIAL INSECURITY: WERE YOU ABLE TO COMPLETE ALL THE BEHAVIORAL HEALTH SCREENINGS?: YES

## 2025-02-23 SDOH — ECONOMIC STABILITY: HOUSING INSECURITY: AT ANY TIME IN THE PAST 12 MONTHS, WERE YOU HOMELESS OR LIVING IN A SHELTER (INCLUDING NOW)?: NO

## 2025-02-23 SDOH — ECONOMIC STABILITY: FOOD INSECURITY: HOW HARD IS IT FOR YOU TO PAY FOR THE VERY BASICS LIKE FOOD, HOUSING, MEDICAL CARE, AND HEATING?: NOT HARD AT ALL

## 2025-02-23 SDOH — SOCIAL STABILITY: SOCIAL INSECURITY: ARE THERE ANY APPARENT SIGNS OF INJURIES/BEHAVIORS THAT COULD BE RELATED TO ABUSE/NEGLECT?: NO

## 2025-02-23 SDOH — SOCIAL STABILITY: SOCIAL INSECURITY: HAS ANYONE EVER THREATENED TO HURT YOUR FAMILY OR YOUR PETS?: NO

## 2025-02-23 SDOH — SOCIAL STABILITY: SOCIAL INSECURITY: DOES ANYONE TRY TO KEEP YOU FROM HAVING/CONTACTING OTHER FRIENDS OR DOING THINGS OUTSIDE YOUR HOME?: NO

## 2025-02-23 SDOH — SOCIAL STABILITY: SOCIAL INSECURITY: WITHIN THE LAST YEAR, HAVE YOU BEEN HUMILIATED OR EMOTIONALLY ABUSED IN OTHER WAYS BY YOUR PARTNER OR EX-PARTNER?: NO

## 2025-02-23 SDOH — SOCIAL STABILITY: SOCIAL INSECURITY: DO YOU FEEL UNSAFE GOING BACK TO THE PLACE WHERE YOU ARE LIVING?: NO

## 2025-02-23 SDOH — SOCIAL STABILITY: SOCIAL INSECURITY: HAVE YOU HAD THOUGHTS OF HARMING ANYONE ELSE?: NO

## 2025-02-23 SDOH — SOCIAL STABILITY: SOCIAL INSECURITY: HAVE YOU HAD ANY THOUGHTS OF HARMING ANYONE ELSE?: NO

## 2025-02-23 ASSESSMENT — LIFESTYLE VARIABLES
SKIP TO QUESTIONS 9-10: 1
HOW MANY STANDARD DRINKS CONTAINING ALCOHOL DO YOU HAVE ON A TYPICAL DAY: 1 OR 2
HOW OFTEN DO YOU HAVE 6 OR MORE DRINKS ON ONE OCCASION: NEVER
AUDIT-C TOTAL SCORE: 1
AUDIT-C TOTAL SCORE: 1
HOW OFTEN DO YOU HAVE A DRINK CONTAINING ALCOHOL: MONTHLY OR LESS

## 2025-02-23 ASSESSMENT — COGNITIVE AND FUNCTIONAL STATUS - GENERAL
DAILY ACTIVITIY SCORE: 24
MOBILITY SCORE: 24
MOBILITY SCORE: 24
PATIENT BASELINE BEDBOUND: NO
DAILY ACTIVITIY SCORE: 24

## 2025-02-23 ASSESSMENT — PAIN SCALES - GENERAL
PAINLEVEL_OUTOF10: 5 - MODERATE PAIN
PAINLEVEL_OUTOF10: 3
PAINLEVEL_OUTOF10: 5 - MODERATE PAIN

## 2025-02-23 ASSESSMENT — ACTIVITIES OF DAILY LIVING (ADL)
TOILETING: INDEPENDENT
GROOMING: INDEPENDENT
DRESSING YOURSELF: INDEPENDENT
BATHING: INDEPENDENT
JUDGMENT_ADEQUATE_SAFELY_COMPLETE_DAILY_ACTIVITIES: YES
ASSISTIVE_DEVICE: EYEGLASSES
FEEDING YOURSELF: INDEPENDENT
LACK_OF_TRANSPORTATION: NO
HEARING - LEFT EAR: FUNCTIONAL
HEARING - RIGHT EAR: FUNCTIONAL
PATIENT'S MEMORY ADEQUATE TO SAFELY COMPLETE DAILY ACTIVITIES?: YES
WALKS IN HOME: INDEPENDENT
LACK_OF_TRANSPORTATION: NO
ADEQUATE_TO_COMPLETE_ADL: YES

## 2025-02-23 ASSESSMENT — PAIN SCALES - WONG BAKER
WONGBAKER_NUMERICALRESPONSE: HURTS LITTLE MORE
WONGBAKER_NUMERICALRESPONSE: NO HURT

## 2025-02-23 ASSESSMENT — PATIENT HEALTH QUESTIONNAIRE - PHQ9
2. FEELING DOWN, DEPRESSED OR HOPELESS: NOT AT ALL
1. LITTLE INTEREST OR PLEASURE IN DOING THINGS: NOT AT ALL
SUM OF ALL RESPONSES TO PHQ9 QUESTIONS 1 & 2: 0

## 2025-02-23 ASSESSMENT — PAIN - FUNCTIONAL ASSESSMENT
PAIN_FUNCTIONAL_ASSESSMENT: 0-10
PAIN_FUNCTIONAL_ASSESSMENT: 0-10

## 2025-02-23 NOTE — CARE PLAN
The patient's goals for the shift include see poc    The clinical goals for the shift include see POC    At 1400, the patient remains alert and oriented to person, place, time, and situation, and is ambulating ad karla in the room with stable vital signs. Medications have been administered and well tolerated; however, the patient now reports bloody stool and rectal bleeding. A GI consultation was obtained, and the GI team has given approval for continued management from their standpoint. Additionally, Solumedrol 40 mg daily is being continued for now as the patient appears to be improving. The patient is also maintained on a modified diet, and telemetry continues to show normal sinus rhythm.  The patient reported no complaints of pain during this shift and has not experienced any further bloody bowel movements. Medications were administered and tolerated well.

## 2025-02-23 NOTE — CONSULTS
Reason For Consult    UC flare vs infectious colitis?       History Of Present Illness  Jolly Adame is a 59 y.o. female with hx of ulcerative colitis on Apriso (1.5 g) daily, CCY who presented to the ED with a one day hx of abdominal pain followed by several episodes of BRBPR.     Patient reports earlier in the week she was experiencing constipation with no bowel movement for about 3 days.  She states she took multiple laxatives on the day of admission (not atypical) and after eating a cheeseburger had cramping abdominal pain that was significant followed by several episodes of bloody diarrhea.  She states she has not had a flare of ulcerative colitis in almost 10 years and has been compliant with mesalamine daily.  She has been in endoscopic remission based on her most recent colonoscopies, last completed in 2023.    On arrival to the emergency department labs are notable for leukocytosis to 19 with a normal hemoglobin.  She did have elevated liver enzymes with ALT of 69 and ALT of 63.  Total bilirubin 1.7.  CT abdomen pelvis demonstrating pericolonic fat stranding involving the distal transverse and proximal ascending colon.  She was given a dose of Zosyn as well as steroids.    On evaluation patient states she feels improved compared with admission.  Stool PCR and ova and parasites collected but no C. difficile PCR collected.    Patient reports hx of Crohn's disease in sister.     She is requesting if she can eat. She has not needed pain medication or anti-nausea medications today     Past Medical History  She has a past medical history of Abnormal MRI, shoulder (10/29/2022), Anxiety, Depression, GERD (gastroesophageal reflux disease), Hyperlipidemia, Hypertension, and Seasonal allergies.    Surgical History  She has no past surgical history on file.     Social History  She reports that she has never smoked. She has never been exposed to tobacco smoke. She has never used smokeless tobacco. She reports current  "alcohol use of about 2.0 standard drinks of alcohol per week. She reports that she does not use drugs.    Family History  Family History   Problem Relation Name Age of Onset    Stroke Mother      Dementia Mother      Heart attack Mother      Diabetes Sister      Crohn's disease Sister      Irritable bowel syndrome Sister      Cancer Daughter      Breast cancer Maternal Grandmother      Colon cancer Maternal Grandfather      Breast cancer Paternal Grandmother      Breast cancer Mother's Sister      Breast cancer Mother's Sister          Allergies  Azithromycin, Bactrim [sulfamethoxazole-trimethoprim], Bee venom protein (honey bee), Erythromycin, Ibuprofen, Iodinated contrast media, and Paxlovid (eua) [nirmatrelvir-ritonavir]    Review of Systems  As documented in HPI     Physical Exam  Visit Vitals  /90   Pulse 101   Temp 36.2 °C (97.2 °F) (Temporal)   Resp 16        GEN: in no acute distress  Head/Eyes: sclera anicteric  Lungs: normal work of breathing  CV: RRR   ABD: normal active bowel sounds, nontender, nondistended, nontympanitic, no hepatosplenomegaly, no palpable masses.  Skin: No rashes on extremities   Neuro: grossly non-focal.  Alert and oriented.  Psych: Appropriate affect       Last Recorded Vitals  Blood pressure 158/90, pulse 101, temperature 36.2 °C (97.2 °F), temperature source Temporal, resp. rate 16, height 1.575 m (5' 2\"), weight 72.5 kg (159 lb 13.3 oz), SpO2 95%.    Relevant Results  As documented in HPI  Assessment/Plan     Jolly Adame is a 59 y.o. female with hx of ulcerative colitis on Apriso (1.5 g) daily, CCY who presented to the ED with a one day hx of abdominal pain followed by several episodes of BRBPR in setting of CT AP demonstrating descending colon colitis. Hospital course c/b elevated liver enzymes in hepatocellular picture as well.     CRP 1.28    Clinical history most suspicious for an ulcerative colitis flare.  It is possible that she may have superimposed component " infectious colitis so would agree with ruling out infectious colitis.  As she is nontoxic on exam I would still recommend continuing with IV Solu-Medrol 40 mg daily and trending CRP while she is inpatient. Will follow results of viral hepatitis serologies given up trending liver enzymes     I spent 60 minutes in the professional and overall care of this patient.      Berna Hwang MD

## 2025-02-23 NOTE — H&P
History Of Present Illness  59 YOM PMH of ulcerative colitis on mesalamine presenting to [facility] ED C/O sudden onset abdominal pain.    In her normal state of health, was eating a cheeseburger, about an hour later had sudden onset abdominal pain, Nausea without emesis.  She hadn't had a bowel movement in 3 days, and had took multiple laxities earlier in the day, and had a normal, formed BM movement, but then had multiple more BM that progressed to diarrhea and then bloody diarrhea. On mesalamine for UC, has not had a UC flare in almost 10 years. Her last 2 colonoscopies the biopsies were normal.  Denies fever, chills, night sweats, HA, blurry vision, dysphagia/odynphagia, productive cough, HUFFMAN, orthopnea, chest pain, palpitations, or swelling of lower extremities. Denies any recent travel.    In the ED: AF HDS & SpO2 WNL on RA but tachycardic at 111.  CBC significant for WBC 19, normal HgB.  CMP showed mild metabolic acidosis with bicarb of 20. Mild transaminitis with ALT 69 &  ALT 63 with normal T brie.  Lactate 3.2 ->1.9 RSV/Flu/SARS-CoV-2 PCRs negative.  CT abd/pelvis: .Wall thickening and pericolonic fat stranding of the distal transverse colon and proximal descending colon at the splenic flexure, compatible with colitis. BCX drawn in ED and pending, Zosyn given and steroids.  Admitted for possible UC Flare.         Past Medical History  She has a past medical history of Abnormal MRI, shoulder (10/29/2022), Anxiety, Depression, GERD (gastroesophageal reflux disease), Hyperlipidemia, Hypertension, and Seasonal allergies.    Surgical History  She has no past surgical history on file.     Social History  She reports that she has never smoked. She has never been exposed to tobacco smoke. She has never used smokeless tobacco. She reports current alcohol use of about 2.0 standard drinks of alcohol per week. She reports that she does not use drugs.    Family History  Family History   Problem Relation Name Age of Onset     Stroke Mother      Dementia Mother      Heart attack Mother      Diabetes Sister      Crohn's disease Sister      Irritable bowel syndrome Sister      Cancer Daughter      Breast cancer Maternal Grandmother      Colon cancer Maternal Grandfather      Breast cancer Paternal Grandmother      Breast cancer Mother's Sister      Breast cancer Mother's Sister          Allergies  Bactrim [sulfamethoxazole-trimethoprim], Bee venom protein (honey bee), Erythromycin, Ibuprofen, Iodinated contrast media, Paxlovid (eua) [nirmatrelvir-ritonavir], and Azithromycin    Review of Systems    12 pt ROS obtained: positives & pertinent negatives listed in HPI   Physical Exam   Constitutional: A&Ox4, NAD, resting comfortable   Head and Face: Atraumatic, normocephalic   Eyes: Normal external exam, EOMI  ENT: Normal external inspection of ears and nose. Oropharynx normal.  Cardiovascular: tachy, normal rate, S1/S2, no murmurs, rubs, or gallops, radial pulses +2  Pulmonary: CTAB, no respiratory distress, no wheezing, rales or rhonchi, on RA  Abdomen: +BS, soft, Mild TTP in periumbilical region, nondistended, no guarding rigidity or rebound tenderness, no masses noted  MSK: Negative for edema, No joint swelling, normal movements of all extremities.   Neuro: No focal deficits, normal motor function, normal sensation, follows all commands  Skin- No lesions, contusions, or erythema.  Psychiatric: Judgment intact. Appropriate mood, affect and behavior   Last Recorded Vitals  /75 (BP Location: Right arm)   Pulse 99   Temp 36.7 °C (98.1 °F) (Temporal)   Resp 16   Wt 74.4 kg (164 lb)   SpO2 94%     Relevant Results             Assessment/Plan   Assessment & Plan  Ulcerative colitis, acute, with rectal bleeding (Multi)  Infectious colitis?   2/4 SIRS -> possible sepsis  Lactate acidosis  Metabolic acidosis     59 YOM PMH of ulcerative colitis on mesalamine presenting to [facility] ED C/O sudden onset abdominal pain.  In her normal state of  health, was eating a cheeseburger, about an hour later had sudden onset abdominal pain, Nausea without emesis.  She hadn't had a bowel movement in 3 days, and had took multiple laxities earlier in the day, and had a normal, formed BM movement, but then had multiple more BM that progressed to diarrhea and then bloody diarrhea. CBC significant for WBC 19, normal HgB.  CMP showed mild metabolic acidosis with bicarb of 20. Mild transaminitis with ALT 69 &  ALT 63 with normal T brie.  Lactate 3.2 ->1.9  CT abd/pelvis: Wall thickening and pericolonic fat stranding of the distal transverse colon and proximal descending colon at the splenic flexure, compatible with colitis.     - BCX 02/23: pending  - Start Zosyn  - GI consulted, appreciate recommendations  - CRP/ESR pending   - Keep NPO until seen by GI  - IVF, antiemetics, and pain control  - Stool pathogen PCR pending   - Hep A IGM AB pending   - Trend H/H    IVF: LR   DVT Ppx: Holding   Diet: NPO  Code Status: FULL CODE    Admitted for UC flare versus infectious colitis with 2/4 SIRS met.  Complexity high, anticipate at least 2 MN stays           Van Barba DO

## 2025-02-23 NOTE — NURSING NOTE
0355: Patient arrives on unit and is stable at time of arrival.    EOS: Patient rested well throughout the early morning. Safety has been maintained. Patient is stable at the time of writing.

## 2025-02-23 NOTE — PROGRESS NOTES
Emergency Medicine Transition of Care Note.    I received Jolly Adame in signout from Dr. Lezama.  Please see the previous ED provider note for all HPI, PE and MDM up to the time of signout at 1400. This is in addition to the primary record.    In brief Jolly Adame is an 59 y.o. female presenting for   Chief Complaint   Patient presents with    Abdominal Pain     Pt presents to ED with c/o generalized abdominal cramping, blood in stool, diarrhea, nausea today     Rectal Bleeding    Diarrhea    Nausea     At the time of signout we were awaiting: CT imaging    ED Course as of 02/23/25 0712   Sun Feb 23, 2025   0155 CT abdomen pelvis wo IV contrast  Thickened bowel and stranding to suggest inflammation.  No terminal endpoint dilation to suggest obstruction. [ES]   0215 Patient updated on findings and recommendation for admission in which she was agreeable with.  Informed of suspicion for ulcerative colitis flare and plan to provide prednisone.  She does report that she has been on prednisone in the past for a year due to inability to wean.  States she feels comfortable right now and does not need any nausea or pain meds. [ES]   0236 Spoke with hospitalist who requested for 40 mg solumedrol instead of 50 mg prednisone pending GI consultation. Admitted to the medicine service. [ES]      ED Course User Index  [ES] Mc Tao MD         Diagnoses as of 02/23/25 0712   Enterocolitis   Bloody diarrhea   Ulcerative colitis, acute, with rectal bleeding (Multi)       Medical Decision Making  This is a 59-year-old female with a history of ulcerative colitis?  evaluated in the ED with complaint of abdominal pain and bloody diarrhea.  She was triggering SIRS in which she was covered with Zosyn for possible intra-abdominal infection.  CT imaging confirms colitis of the transverse and descending colon concerning for ulcerative colitis flare.  Patient provided 40 mg Solu-Medrol.  She is agreeable to staying in the hospital.   Patient admitted to the medicine service in stable condition.        Final diagnoses:   [K52.9] Enterocolitis   [R19.7] Bloody diarrhea   [K51.911] Ulcerative colitis, acute, with rectal bleeding (Multi)           Procedure  Procedures    Mc Tao MD   Emergency medicine, PGY3

## 2025-02-23 NOTE — ED PROVIDER NOTES
Emergency Department Provider Note        History of Present Illness     History provided by: Patient  Limitations to History: None  External Records Reviewed with Brief Summary: None    HPI:  Jolly Adame is a 59 y.o. female with history of ulcerative colitis, HTN, HLD, and GERD, who presents to the ED with complaint of abdominal pain and rectal bleed.   Stated that pain is localized in the epigastric region.  Rates the pain a 10/10 in severity.  Patient stated that she has had ulcerative colitis since she was 20 years old.  Stated that she recently had colonoscopy last year which was followed by biopsy confirming that she no longer has ulcerative colitis.  Stated that they were having lunch and she ate French fries which triggered some abdominal discomfort.  Of note she stated that she had last bowel movement about 3 days ago.  That she attempted to use the bathroom but unable to move her bowel.  Patient went home and took MiraLAX and Dulcolax bowel regimen.  Stated that she had forced bowel movement of normal solid stool.  Subsequently she had several episode of diarrhea and eventually bloody diarrhea. Denies fever, headache, lightheadedness, shortness of breath, chest pain, or vomiting.    Physical Exam   Triage vitals:  T 36.7 °C (98.1 °F)  HR (!) 111  /79  RR 20  O2 97 % None (Room air)    General: Awake, alert, in no acute distress  Eyes: Gaze conjugate.  No scleral icterus or injection  HENT: Normo-cephalic, atraumatic. No stridor  CV: Tachycardic rate, regular rhythm. Radial pulses 2+ bilaterally  Resp: Breathing non-labored, speaking in full sentences.  Clear to auscultation bilaterally  GI: Soft, non-distended, non-tender. No rebound or guarding.  : Not examined.  MSK/Extremities: No gross bony deformities. Moving all extremities  Skin: Warm. Appropriate color  Neuro: Alert. Oriented. Face symmetric. Speech is fluent.  Gross strength and sensation intact in b/l UE and LEs  Psych: Appropriate  mood and affect    Medical Decision Making & ED Course   Medical Decision Makin y.o. female with history of ulcerative colitis, HTN, HLD, and GERD, who presents to the ED with complaint of abdominal pain and rectal bleed. On arrival to the ED, the patient was stable, A&Ox3, non-toxic, well-appearing, and in no acute distress. Primary assessment is evident for intact ABC. The patient was able to give account of current event and verbalize presenting symptoms.    My personal assessment is directed towards ordering labs including CBC, CMP, coagulation status and key cardiac enzyme to rule out anemia, electrolyte abnormality, and cardiac event. Also, I ordered EKG and chest x-ray to rule out possible cardiopulmonary abnormality, or critical esophageal etiology.    Lab shows no evidence of significant anemia.  There is leukocytosis with WBC of 19.0.  PT/INR is normal.  Chemistry panel is notable for hyperglycemia with glucose of 297.  Renal function is normal.  Liver enzymes mildly elevated.  Urinalysis was negative for UTI.  Lipase is normal.  Patient tested negative for COVID, flu, and RSV viruses.  Lactate was normal.  CT abdomen and pelvis shows      The patient was signed out to the night shift resident, Dr. Tao.  ----  Differential diagnoses considered include but are not limited to: ***    Care Considerations: As documented above in MDM    ED Course:     Disposition   {ED Disposition:72588}    Procedures   Procedures    This was a shared visit with an ED attending.  The patient was seen and discussed with the ED attending Dr. Sorto.    Link Lezama MD  Emergency Medicine, PGY-2   not need any nausea or pain meds. [ES]   0236 Spoke with hospitalist who requested for 40 mg solumedrol instead of 50 mg prednisone pending GI consultation. Admitted to the medicine service. [ES]      ED Course User Index  [ES] Mc Tao MD         Diagnoses as of 25   Enterocolitis   Bloody diarrhea   Ulcerative colitis, acute, with rectal bleeding (Multi)     Disposition   As a result of their workup, the patient will require admission to the hospital.  The patient was informed of her diagnosis.  The patient was given the opportunity to ask questions and I answered them. The patient agreed to be admitted to the hospital.    Procedures   Procedures    This was a shared visit with an ED attending.  The patient was seen and discussed with the ED attending Dr. Sorto.    Link Lezama MD  Emergency Medicine, PGY-2          ED ATTENDING ATTESTATION    The patient was seen by the resident/fellow.  I have personally performed a substantive portion of the encounter.  I have seen and examined the patient; agree with the workup, evaluation, MDM, management and diagnosis.  The care plan has been discussed with the resident/fellow; I have reviewed the resident/fellow’s note and agree with the documented findings with the exception/addition of the followin-year-old female with questionable hx of UC, HTN, HLD presenting with abdominal pain and diarrhea.  Patient reports she has had history of UC, but recent scopes of showed no evidence of it, so prior diagnosis being question.  She has had prior issues with GI symptoms though.  She states otherwise she has been feeling well recently, and had abdominal discomfort, and then several episodes of diarrhea, first watery, and last p.o. more bloody.  Nausea but no vomiting.  Denies fevers.  On arrival, patient noted to be tachycardic to 110, did improve with fluids.  Other vital stable.  Did have some mild diffuse abdominal tenderness on exam.   Nontoxic-appearing though.  Labs significant for leukocytosis of 19, otherwise unremarkable though.  Lactate normal.  Blood cultures ordered, and we will obtain CT abdomen/pelvis to look for signs of sepsis including infectious colitis.  Due to her elevated white count as well as blood in her diarrhea, I am concerned for potential infectious colitis.  She denies any prior known history of diverticulosis on colonoscopy, so less likely to be diverticulitis. No recent antibiotics but cdiff is in differential as well. She was covered with Zosyn prior to Ct results since she was SIRS positive.  Patient signed out to oncoming provider pending CT imaging and reevaluation. I suspect patient will need to be admitted due to her leukocytosis as well as further symptomatic control.      MD Dom Blackburn MD  03/01/25 0068

## 2025-02-24 LAB
ALBUMIN SERPL BCP-MCNC: 3.9 G/DL (ref 3.4–5)
ALP SERPL-CCNC: 97 U/L (ref 33–110)
ALT SERPL W P-5'-P-CCNC: 110 U/L (ref 7–45)
ANION GAP SERPL CALC-SCNC: 13 MMOL/L (ref 10–20)
AST SERPL W P-5'-P-CCNC: 38 U/L (ref 9–39)
BACTERIA UR CULT: NORMAL
BILIRUB SERPL-MCNC: 0.7 MG/DL (ref 0–1.2)
BUN SERPL-MCNC: 23 MG/DL (ref 6–23)
C COLI+JEJ+UPSA DNA STL QL NAA+PROBE: NOT DETECTED
CALCIUM SERPL-MCNC: 9.1 MG/DL (ref 8.6–10.3)
CHLORIDE SERPL-SCNC: 102 MMOL/L (ref 98–107)
CO2 SERPL-SCNC: 25 MMOL/L (ref 21–32)
CREAT SERPL-MCNC: 0.7 MG/DL (ref 0.5–1.05)
CRP SERPL-MCNC: 4.17 MG/DL
EC STX1 GENE STL QL NAA+PROBE: NOT DETECTED
EC STX2 GENE STL QL NAA+PROBE: NOT DETECTED
EGFRCR SERPLBLD CKD-EPI 2021: >90 ML/MIN/1.73M*2
ERYTHROCYTE [DISTWIDTH] IN BLOOD BY AUTOMATED COUNT: 13.8 % (ref 11.5–14.5)
GLUCOSE SERPL-MCNC: 287 MG/DL (ref 74–99)
HCT VFR BLD AUTO: 36.4 % (ref 36–46)
HGB BLD-MCNC: 11.6 G/DL (ref 12–16)
MAGNESIUM SERPL-MCNC: 2.03 MG/DL (ref 1.6–2.4)
MCH RBC QN AUTO: 29.1 PG (ref 26–34)
MCHC RBC AUTO-ENTMCNC: 31.9 G/DL (ref 32–36)
MCV RBC AUTO: 92 FL (ref 80–100)
NOROVIRUS GI + GII RNA STL NAA+PROBE: NOT DETECTED
NRBC BLD-RTO: 0 /100 WBCS (ref 0–0)
PHOSPHATE SERPL-MCNC: 3 MG/DL (ref 2.5–4.9)
PLATELET # BLD AUTO: 309 X10*3/UL (ref 150–450)
POTASSIUM SERPL-SCNC: 4.2 MMOL/L (ref 3.5–5.3)
PROT SERPL-MCNC: 6.2 G/DL (ref 6.4–8.2)
RBC # BLD AUTO: 3.98 X10*6/UL (ref 4–5.2)
RV RNA STL NAA+PROBE: NOT DETECTED
SALMONELLA DNA STL QL NAA+PROBE: NOT DETECTED
SHIGELLA DNA SPEC QL NAA+PROBE: NOT DETECTED
SODIUM SERPL-SCNC: 136 MMOL/L (ref 136–145)
V CHOLERAE DNA STL QL NAA+PROBE: NOT DETECTED
WBC # BLD AUTO: 18 X10*3/UL (ref 4.4–11.3)
Y ENTEROCOL DNA STL QL NAA+PROBE: NOT DETECTED

## 2025-02-24 PROCEDURE — 2500000001 HC RX 250 WO HCPCS SELF ADMINISTERED DRUGS (ALT 637 FOR MEDICARE OP): Performed by: INTERNAL MEDICINE

## 2025-02-24 PROCEDURE — 1100000001 HC PRIVATE ROOM DAILY

## 2025-02-24 PROCEDURE — 85027 COMPLETE CBC AUTOMATED: CPT | Performed by: INTERNAL MEDICINE

## 2025-02-24 PROCEDURE — 80053 COMPREHEN METABOLIC PANEL: CPT | Performed by: INTERNAL MEDICINE

## 2025-02-24 PROCEDURE — 2500000004 HC RX 250 GENERAL PHARMACY W/ HCPCS (ALT 636 FOR OP/ED)

## 2025-02-24 PROCEDURE — 99232 SBSQ HOSP IP/OBS MODERATE 35: CPT | Performed by: NURSE PRACTITIONER

## 2025-02-24 PROCEDURE — 84100 ASSAY OF PHOSPHORUS: CPT | Performed by: INTERNAL MEDICINE

## 2025-02-24 PROCEDURE — 83735 ASSAY OF MAGNESIUM: CPT | Performed by: INTERNAL MEDICINE

## 2025-02-24 PROCEDURE — 36415 COLL VENOUS BLD VENIPUNCTURE: CPT | Performed by: INTERNAL MEDICINE

## 2025-02-24 PROCEDURE — 86140 C-REACTIVE PROTEIN: CPT | Performed by: INTERNAL MEDICINE

## 2025-02-24 PROCEDURE — 2500000002 HC RX 250 W HCPCS SELF ADMINISTERED DRUGS (ALT 637 FOR MEDICARE OP, ALT 636 FOR OP/ED): Performed by: INTERNAL MEDICINE

## 2025-02-24 PROCEDURE — 99232 SBSQ HOSP IP/OBS MODERATE 35: CPT | Performed by: INTERNAL MEDICINE

## 2025-02-24 PROCEDURE — 2500000004 HC RX 250 GENERAL PHARMACY W/ HCPCS (ALT 636 FOR OP/ED): Mod: JZ | Performed by: INTERNAL MEDICINE

## 2025-02-24 RX ADMIN — VALSARTAN 80 MG: 80 TABLET, FILM COATED ORAL at 20:50

## 2025-02-24 RX ADMIN — METHYLPREDNISOLONE SODIUM SUCCINATE 40 MG: 40 INJECTION, POWDER, FOR SOLUTION INTRAMUSCULAR; INTRAVENOUS at 14:26

## 2025-02-24 RX ADMIN — BUSPIRONE HYDROCHLORIDE 7.5 MG: 15 TABLET ORAL at 08:26

## 2025-02-24 RX ADMIN — PIPERACILLIN SODIUM AND TAZOBACTAM SODIUM 3.38 G: 3; .375 INJECTION, SOLUTION INTRAVENOUS at 07:52

## 2025-02-24 RX ADMIN — Medication 10 MG: at 20:50

## 2025-02-24 RX ADMIN — MESALAMINE 1.2 G: 1.2 TABLET, DELAYED RELEASE ORAL at 12:25

## 2025-02-24 RX ADMIN — NORTRIPTYLINE HYDROCHLORIDE 50 MG: 25 CAPSULE ORAL at 20:50

## 2025-02-24 RX ADMIN — PIPERACILLIN SODIUM AND TAZOBACTAM SODIUM 3.38 G: 3; .375 INJECTION, SOLUTION INTRAVENOUS at 15:39

## 2025-02-24 RX ADMIN — METOPROLOL SUCCINATE 25 MG: 25 TABLET, EXTENDED RELEASE ORAL at 20:51

## 2025-02-24 RX ADMIN — DILTIAZEM HYDROCHLORIDE 120 MG: 120 CAPSULE, COATED, EXTENDED RELEASE ORAL at 12:25

## 2025-02-24 RX ADMIN — SIMVASTATIN 20 MG: 20 TABLET, FILM COATED ORAL at 12:25

## 2025-02-24 RX ADMIN — PANTOPRAZOLE SODIUM 20 MG: 20 TABLET, DELAYED RELEASE ORAL at 12:25

## 2025-02-24 ASSESSMENT — COGNITIVE AND FUNCTIONAL STATUS - GENERAL
DAILY ACTIVITIY SCORE: 24
MOBILITY SCORE: 24

## 2025-02-24 ASSESSMENT — ACTIVITIES OF DAILY LIVING (ADL): LACK_OF_TRANSPORTATION: NO

## 2025-02-24 ASSESSMENT — PAIN SCALES - GENERAL: PAINLEVEL_OUTOF10: 0 - NO PAIN

## 2025-02-24 NOTE — CARE PLAN
Pt had an uneventful day with no acute changes noted. Pt remains on precaution. Pt was able to tolerate IV ATB well. Pt had no BM noted this shift. Pt  is at bedside. Pt safety been maintained.

## 2025-02-24 NOTE — PROGRESS NOTES
"Subjective  Patient sitting up on side of bed with  at bedside.  Patient had formed bowel movement yesterday though did have diarrhea today after eating and initiation of antibiotics.  Stool studies and viral hepatitis panels WNL.  Plan to continue IV Solu-Medrol.  Will transition to oral prednisone prior to discharge.  Patient will follow-up in GI clinic.    Objective  Blood pressure 117/74, pulse 89, temperature 35.9 °C (96.6 °F), temperature source Temporal, resp. rate 18, height 1.575 m (5' 2\"), weight 72.5 kg (159 lb 13.3 oz), SpO2 97%.    Physical Exam  Constitutional: Alert, pleasant and interactive, in NAD,  at bedside  Eyes: PERRL, sclera clear, no conjunctival injection  Skin: Warm and dry, no rash or ecchymosis  ENMT: Mucous membranes moist, no lesions noted  Resp: CTAB, even and unlabored  CV: RRR, normal S1, S2, no m,r,g  GI: +BS, soft, round, NT, no rebound tenderness or guarding, no palpable masses or organomegaly  Extremities: Extremities warm, no edema, contusions, wounds or cyanosis  Neuro: Alert and oriented x3  Psych: Appropriate mood and behavior    Medications  Scheduled medications  busPIRone, 7.5 mg, oral, Daily  dilTIAZem CD, 120 mg, oral, Daily with lunch  melatonin, 10 mg, oral, Nightly  mesalamine, 1.2 g, oral, Daily with lunch  methylPREDNISolone sodium succinate (PF), 40 mg, intravenous, q24h  metoprolol succinate XL, 25 mg, oral, Nightly  nortriptyline, 50 mg, oral, Nightly  pantoprazole, 20 mg, oral, Daily with lunch  piperacillin-tazobactam, 3.375 g, intravenous, q8h  simvastatin, 20 mg, oral, Daily with lunch  valsartan, 80 mg, oral, Nightly      Continuous medications     PRN medications  PRN medications: HYDROmorphone, HYDROmorphone, ondansetron     Labs  Lab Results   Component Value Date    WBC 18.0 (H) 02/24/2025    HGB 11.6 (L) 02/24/2025    HCT 36.4 02/24/2025    MCV 92 02/24/2025     02/24/2025     Lab Results   Component Value Date    GLUCOSE 287 (H) " 02/24/2025    CALCIUM 9.1 02/24/2025     02/24/2025    K 4.2 02/24/2025    CO2 25 02/24/2025     02/24/2025    BUN 23 02/24/2025    CREATININE 0.70 02/24/2025     Lab Results   Component Value Date     (H) 02/24/2025    AST 38 02/24/2025    ALKPHOS 97 02/24/2025    BILITOT 0.7 02/24/2025     Lab Results   Component Value Date    IRON 87 09/13/2023    TIBC 376 09/13/2023    FERRITIN 744 (H) 02/23/2025     Lab Results   Component Value Date    INR 1.1 02/22/2025    INR 0.9 02/25/2023    PROTIME 11.9 02/22/2025    PROTIME 10.8 02/25/2023       Radiology  CT A/P without IV contrast 2/22/2025 noting:  Impression:     1.Wall thickening and pericolonic fat stranding of the distal  transverse colon and proximal descending colon at the splenic flexure,  compatible with colitis.  2.Hepatic steatosis.  Signed by Hubert Kenmare     Assessment  Jolly dAame is a 59 y.o. female with PMH of UC on a Apriso, CCY presenting after 1 day of abdominal discomfort with several episodes of BRBPR.  Leukocytosis of 19 on arrival with normal Hgb.  CT noting pericolonic fat stranding in distal transverse and proximal ascending colon.  She was started on Zosyn as well as IV Solu-Medrol.  Symptoms are improving.  Tolerating diet.    # ulcerative colitis flare  # leukocytosis  # transaminitis/ hyperbilirubinemia- normalizing  # elevated CRP  # previous CCY    Plan:  - continue supportive care  - diet as tolerated ok from GI standpoint  - continue IV solumedrol per current order  - continue antibiotics per primary team/ID recs  - continue daily PPI  - pt will establish care in GI clinic    Plan has been discussed with Dr. Hwang. GI will continue to follow.     Diane Potter, APRN/CNP

## 2025-02-24 NOTE — PROGRESS NOTES
"Jolly Adame is a 59 y.o. female on day 1 of admission presenting with Ulcerative colitis, acute, with rectal bleeding (Multi).    Subjective   Patient is feeling better, she did have a several episodes of loose stools today, she was attributing it initially to her diet, although after discussion more likely this is due to antibiotic.  Although she does endorse that there is no blood, she is feeling better, she denies any fevers or chills.  Her  is at the bedside.    Objective     Physical Exam  Constitutional: A&Ox4, NAD, resting comfortable   Head and Face: Atraumatic, normocephalic   Eyes: Normal external exam  ENT: Normal external inspection of ears and nose. Oropharynx normal.  Cardiovascular: tachy, normal rate, S1/S2, no murmurs, rubs, or gallops, radial pulses +2  Pulmonary: CTAB, no respiratory distress, no wheezing, rales or rhonchi, on RA  Abdomen: +BS, soft, Mild TTP in periumbilical region, nondistended, no guarding rigidity or rebound tenderness, no masses noted  MSK: Negative for edema, No joint swelling, normal movements of all extremities.   Neuro: No focal deficits, normal motor function, normal sensation, follows all commands  Skin- No lesions, contusions, or erythema.  Psychiatric: Judgment intact. Appropriate mood, affect and behavior     Last Recorded Vitals  Blood pressure 117/74, pulse 89, temperature 35.9 °C (96.6 °F), temperature source Temporal, resp. rate 18, height 1.575 m (5' 2\"), weight 72.5 kg (159 lb 13.3 oz), SpO2 97%.  Intake/Output last 3 Shifts:  I/O last 3 completed shifts:  In: 973.8 (13.4 mL/kg) [P.O.:240; I.V.:633.8 (8.7 mL/kg); IV Piggyback:100]  Out: - (0 mL/kg)   Weight: 72.5 kg     Relevant Results  Scheduled medications  busPIRone, 7.5 mg, oral, Daily  dilTIAZem CD, 120 mg, oral, Daily with lunch  melatonin, 10 mg, oral, Nightly  mesalamine, 1.2 g, oral, Daily with lunch  methylPREDNISolone sodium succinate (PF), 40 mg, intravenous, q24h  metoprolol succinate " XL, 25 mg, oral, Nightly  nortriptyline, 50 mg, oral, Nightly  pantoprazole, 20 mg, oral, Daily with lunch  piperacillin-tazobactam, 3.375 g, intravenous, q8h  simvastatin, 20 mg, oral, Daily with lunch  valsartan, 80 mg, oral, Nightly      Continuous medications     PRN medications  PRN medications: HYDROmorphone, HYDROmorphone, ondansetron    Results for orders placed or performed during the hospital encounter of 02/22/25 (from the past 24 hours)   CBC   Result Value Ref Range    WBC 18.0 (H) 4.4 - 11.3 x10*3/uL    nRBC 0.0 0.0 - 0.0 /100 WBCs    RBC 3.98 (L) 4.00 - 5.20 x10*6/uL    Hemoglobin 11.6 (L) 12.0 - 16.0 g/dL    Hematocrit 36.4 36.0 - 46.0 %    MCV 92 80 - 100 fL    MCH 29.1 26.0 - 34.0 pg    MCHC 31.9 (L) 32.0 - 36.0 g/dL    RDW 13.8 11.5 - 14.5 %    Platelets 309 150 - 450 x10*3/uL   Comprehensive Metabolic Panel   Result Value Ref Range    Glucose 287 (H) 74 - 99 mg/dL    Sodium 136 136 - 145 mmol/L    Potassium 4.2 3.5 - 5.3 mmol/L    Chloride 102 98 - 107 mmol/L    Bicarbonate 25 21 - 32 mmol/L    Anion Gap 13 10 - 20 mmol/L    Urea Nitrogen 23 6 - 23 mg/dL    Creatinine 0.70 0.50 - 1.05 mg/dL    eGFR >90 >60 mL/min/1.73m*2    Calcium 9.1 8.6 - 10.3 mg/dL    Albumin 3.9 3.4 - 5.0 g/dL    Alkaline Phosphatase 97 33 - 110 U/L    Total Protein 6.2 (L) 6.4 - 8.2 g/dL    AST 38 9 - 39 U/L    Bilirubin, Total 0.7 0.0 - 1.2 mg/dL     (H) 7 - 45 U/L   Magnesium   Result Value Ref Range    Magnesium 2.03 1.60 - 2.40 mg/dL   Phosphorus   Result Value Ref Range    Phosphorus 3.0 2.5 - 4.9 mg/dL   C-reactive protein   Result Value Ref Range    C-Reactive Protein 4.17 (H) <1.00 mg/dL     CT abdomen pelvis wo IV contrast    Result Date: 2/23/2025  STUDY: CT Abdomen and Pelvis without IV Contrast; 2/22/2025 at 11:43 PM. INDICATION: Pain. COMPARISON: CT AP 10/9/2024; US abdomen 2/22/2024. ACCESSION NUMBER(S): [id number] ORDERING CLINICIAN: MILO CLARK TECHNIQUE: CT of the abdomen and pelvis was  performed.  Contiguous axial images were obtained at 3 mm slice thickness through the abdomen and pelvis. Coronal and sagittal reconstructions at 3 mm slice thickness were performed. No intravenous contrast was administered.  Automated mA/kV exposure control was utilized and patient examination was performed in strict accordance with principles of ALARA. FINDINGS: Please note that the evaluation of vessels, lymph nodes and organs is limited without intravenous contrast.  LOWER CHEST: No cardiomegaly.  No pericardial effusion.  Lung bases are clear.  ABDOMEN:  LIVER: Diffuse hepatic steatosis.  BILE DUCTS: No intrahepatic or extrahepatic biliary ductal dilatation.  GALLBLADDER: The gallbladder is unremarkable. STOMACH: No abnormalities identified.  PANCREAS: No masses or ductal dilatation.  SPLEEN: No splenomegaly or focal splenic lesion.  ADRENAL GLANDS: No thickening or nodules.  KIDNEYS AND URETERS: Kidneys are normal in size and location.  No renal or ureteral calculi.  PELVIS:  BLADDER: Decompressed urinary bladder.  REPRODUCTIVE ORGANS: No abnormalities identified.  BOWEL: Underdistended bowel limits evaluation.  Wall thickening and pericolonic fat stranding of the distal transverse colon and proximal descending colon at the splenic flexure, compatible with colitis.  No pneumoperitoneum or drainable collection.  VESSELS: Scattered atherosclerotic vascular calcifications.  PERITONEUM/RETROPERITONEUM/LYMPH NODES: No free fluid.  No pneumoperitoneum.  No lymphadenopathy.  ABDOMINAL WALL: No abnormalities identified. SOFT TISSUES: No abnormalities identified.  BONES: No acute fracture or aggressive osseous lesion.    1.Wall thickening and pericolonic fat stranding of the distal transverse colon and proximal descending colon at the splenic flexure, compatible with colitis. 2.Hepatic steatosis. Signed by Hubert Arora         Assessment/Plan   This is a very pleasant 59-year-old lady with a known history of  ulcerative colitis who presented with abdominal pain and bloody bowel movements after coming back from vacation in [place], symptoms congruent with?  Ulcerative colitis flare versus possible concomitant infectious colitis, symptoms improving on glucocorticoids and anti-infectives.  Assessment & Plan  Ulcerative colitis, acute, with rectal bleeding (Multi)  ?infectious colitis  Lactic acidosis and Metabolic acidosis improved    Plan:  - Patient stable to remain at inpatient level of care  - Continue antibiotics  until stool pathogen panel results  - Continue steroids at current rate  - Likely to be discharged to home tomorrow  -Appreciate GI assistance in care    Diet: Regular  VTE PPx: Ambulate  Code: Full    Plan of care discussed in detail with the patient and her  at the bedside at the time of rounding this morning         I spent 35 minutes in the professional and overall care of this patient.    Brady Bright MD

## 2025-02-24 NOTE — PROGRESS NOTES
02/24/25 1152   Discharge Planning   Living Arrangements Spouse/significant other   Support Systems Spouse/significant other   Assistance Needed none   Type of Residence Private residence   Home or Post Acute Services None   Expected Discharge Disposition Home   Does the patient need discharge transport arranged? No   Financial Resource Strain   How hard is it for you to pay for the very basics like food, housing, medical care, and heating? Not hard   Housing Stability   In the last 12 months, was there a time when you were not able to pay the mortgage or rent on time? N   At any time in the past 12 months, were you homeless or living in a shelter (including now)? N   Transportation Needs   In the past 12 months, has lack of transportation kept you from medical appointments or from getting medications? no   In the past 12 months, has lack of transportation kept you from meetings, work, or from getting things needed for daily living? No   Intensity of Service   Intensity of Service 0-30 min     Spoke to patient at bedside to explain my role in discharge planning. Patient states she lives at home with her . PCP is Dr Raza. Patient uses [facility] in [place] for prescriptions. Patient states she feels she effectively manages her health at home and plans to return home when medically ready.

## 2025-02-24 NOTE — NURSING NOTE
EOS: Patient had one formed bowel movement during shift, and therefore was not able to send formed BM to lab for Cdiff sample, provider notified. Patient rested well throughout the night. Patient had minimal complaints of pain. Safety has been maintained. Patient is stable at the time of writing.

## 2025-02-25 ENCOUNTER — PHARMACY VISIT (OUTPATIENT)
Dept: PHARMACY | Facility: CLINIC | Age: 60
End: 2025-02-25
Payer: MEDICARE

## 2025-02-25 VITALS
DIASTOLIC BLOOD PRESSURE: 81 MMHG | OXYGEN SATURATION: 99 % | SYSTOLIC BLOOD PRESSURE: 146 MMHG | RESPIRATION RATE: 18 BRPM | TEMPERATURE: 96.6 F | BODY MASS INDEX: 29.41 KG/M2 | HEART RATE: 90 BPM | HEIGHT: 62 IN | WEIGHT: 159.83 LBS

## 2025-02-25 DIAGNOSIS — I10 HYPERTENSION, ESSENTIAL: ICD-10-CM

## 2025-02-25 LAB
ALBUMIN SERPL BCP-MCNC: 3.9 G/DL (ref 3.4–5)
ALP SERPL-CCNC: 81 U/L (ref 33–110)
ALT SERPL W P-5'-P-CCNC: 76 U/L (ref 7–45)
ANION GAP SERPL CALC-SCNC: 14 MMOL/L (ref 10–20)
AST SERPL W P-5'-P-CCNC: 16 U/L (ref 9–39)
BILIRUB SERPL-MCNC: 0.5 MG/DL (ref 0–1.2)
BUN SERPL-MCNC: 25 MG/DL (ref 6–23)
CALCIUM SERPL-MCNC: 9.1 MG/DL (ref 8.6–10.3)
CHLORIDE SERPL-SCNC: 102 MMOL/L (ref 98–107)
CO2 SERPL-SCNC: 25 MMOL/L (ref 21–32)
CREAT SERPL-MCNC: 0.67 MG/DL (ref 0.5–1.05)
CRYPTOSP AG STL QL IA: NEGATIVE
EGFRCR SERPLBLD CKD-EPI 2021: >90 ML/MIN/1.73M*2
ERYTHROCYTE [DISTWIDTH] IN BLOOD BY AUTOMATED COUNT: 13.9 % (ref 11.5–14.5)
G LAMBLIA AG STL QL IA: NEGATIVE
GLUCOSE SERPL-MCNC: 215 MG/DL (ref 74–99)
HCT VFR BLD AUTO: 36.5 % (ref 36–46)
HGB BLD-MCNC: 11.8 G/DL (ref 12–16)
MAGNESIUM SERPL-MCNC: 2.26 MG/DL (ref 1.6–2.4)
MCH RBC QN AUTO: 29.6 PG (ref 26–34)
MCHC RBC AUTO-ENTMCNC: 32.3 G/DL (ref 32–36)
MCV RBC AUTO: 92 FL (ref 80–100)
NRBC BLD-RTO: 0 /100 WBCS (ref 0–0)
PHOSPHATE SERPL-MCNC: 3.8 MG/DL (ref 2.5–4.9)
PLATELET # BLD AUTO: 332 X10*3/UL (ref 150–450)
POTASSIUM SERPL-SCNC: 4 MMOL/L (ref 3.5–5.3)
PROT SERPL-MCNC: 6.2 G/DL (ref 6.4–8.2)
RBC # BLD AUTO: 3.98 X10*6/UL (ref 4–5.2)
SODIUM SERPL-SCNC: 137 MMOL/L (ref 136–145)
WBC # BLD AUTO: 19.2 X10*3/UL (ref 4.4–11.3)

## 2025-02-25 PROCEDURE — RXMED WILLOW AMBULATORY MEDICATION CHARGE

## 2025-02-25 PROCEDURE — 80053 COMPREHEN METABOLIC PANEL: CPT | Performed by: INTERNAL MEDICINE

## 2025-02-25 PROCEDURE — 99239 HOSP IP/OBS DSCHRG MGMT >30: CPT | Performed by: INTERNAL MEDICINE

## 2025-02-25 PROCEDURE — 85027 COMPLETE CBC AUTOMATED: CPT | Performed by: INTERNAL MEDICINE

## 2025-02-25 PROCEDURE — 2500000002 HC RX 250 W HCPCS SELF ADMINISTERED DRUGS (ALT 637 FOR MEDICARE OP, ALT 636 FOR OP/ED): Performed by: INTERNAL MEDICINE

## 2025-02-25 PROCEDURE — 2500000001 HC RX 250 WO HCPCS SELF ADMINISTERED DRUGS (ALT 637 FOR MEDICARE OP): Performed by: INTERNAL MEDICINE

## 2025-02-25 PROCEDURE — 84100 ASSAY OF PHOSPHORUS: CPT | Performed by: INTERNAL MEDICINE

## 2025-02-25 PROCEDURE — 2500000004 HC RX 250 GENERAL PHARMACY W/ HCPCS (ALT 636 FOR OP/ED)

## 2025-02-25 PROCEDURE — 2500000004 HC RX 250 GENERAL PHARMACY W/ HCPCS (ALT 636 FOR OP/ED): Performed by: INTERNAL MEDICINE

## 2025-02-25 PROCEDURE — 83735 ASSAY OF MAGNESIUM: CPT | Performed by: INTERNAL MEDICINE

## 2025-02-25 PROCEDURE — 36415 COLL VENOUS BLD VENIPUNCTURE: CPT | Performed by: INTERNAL MEDICINE

## 2025-02-25 RX ORDER — PREDNISONE 10 MG/1
TABLET ORAL
Qty: 126 TABLET | Refills: 0 | Status: SHIPPED | OUTPATIENT
Start: 2025-02-25 | End: 2025-04-22

## 2025-02-25 RX ORDER — PREDNISONE 20 MG/1
40 TABLET ORAL ONCE
Status: COMPLETED | OUTPATIENT
Start: 2025-02-25 | End: 2025-02-25

## 2025-02-25 RX ORDER — VALSARTAN 80 MG/1
80 TABLET ORAL DAILY
Qty: 90 TABLET | Refills: 3 | Status: SHIPPED | OUTPATIENT
Start: 2025-02-25 | End: 2026-02-25

## 2025-02-25 RX ADMIN — PANTOPRAZOLE SODIUM 20 MG: 20 TABLET, DELAYED RELEASE ORAL at 11:51

## 2025-02-25 RX ADMIN — MESALAMINE 1.2 G: 1.2 TABLET, DELAYED RELEASE ORAL at 11:51

## 2025-02-25 RX ADMIN — PIPERACILLIN SODIUM AND TAZOBACTAM SODIUM 3.38 G: 3; .375 INJECTION, SOLUTION INTRAVENOUS at 01:00

## 2025-02-25 RX ADMIN — SIMVASTATIN 20 MG: 20 TABLET, FILM COATED ORAL at 11:51

## 2025-02-25 RX ADMIN — PREDNISONE 40 MG: 20 TABLET ORAL at 12:09

## 2025-02-25 RX ADMIN — BUSPIRONE HYDROCHLORIDE 7.5 MG: 15 TABLET ORAL at 08:52

## 2025-02-25 RX ADMIN — PIPERACILLIN SODIUM AND TAZOBACTAM SODIUM 3.38 G: 3; .375 INJECTION, SOLUTION INTRAVENOUS at 08:52

## 2025-02-25 RX ADMIN — DILTIAZEM HYDROCHLORIDE 120 MG: 120 CAPSULE, COATED, EXTENDED RELEASE ORAL at 11:51

## 2025-02-25 ASSESSMENT — PAIN SCALES - GENERAL
PAINLEVEL_OUTOF10: 2
PAINLEVEL_OUTOF10: 0 - NO PAIN

## 2025-02-25 NOTE — PROGRESS NOTES
"Subjective  Patient sitting up on side of bed with  at bedside.  Patient continues to have episodes of diarrhea though no further bleeding noted. Tolerating diet.  Plan to transition to oral prednisone.  Will stop antibiotics given negative stool pathogen's.  Patient will follow-up in GI clinic.    Objective  Blood pressure 146/81, pulse 90, temperature 35.9 °C (96.6 °F), temperature source Temporal, resp. rate 18, height 1.575 m (5' 2\"), weight 72.5 kg (159 lb 13.3 oz), SpO2 99%.    Physical Exam  Constitutional: Alert, pleasant and interactive, in NAD,  at bedside  Eyes: PERRL, sclera clear, no conjunctival injection  Skin: Warm and dry, no rash or ecchymosis  ENMT: Mucous membranes moist, no lesions noted  Resp: CTAB, even and unlabored  CV: RRR, normal S1, S2, no m,r,g  GI: +BS, soft, round, NT, no rebound tenderness or guarding, no palpable masses or organomegaly  Extremities: Extremities warm, no edema, contusions, wounds or cyanosis  Neuro: Alert and oriented x3  Psych: Appropriate mood and behavior    Medications  Scheduled medications  busPIRone, 7.5 mg, oral, Daily  dilTIAZem CD, 120 mg, oral, Daily with lunch  melatonin, 10 mg, oral, Nightly  mesalamine, 1.2 g, oral, Daily with lunch  metoprolol succinate XL, 25 mg, oral, Nightly  nortriptyline, 50 mg, oral, Nightly  pantoprazole, 20 mg, oral, Daily with lunch  piperacillin-tazobactam, 3.375 g, intravenous, q8h  simvastatin, 20 mg, oral, Daily with lunch  valsartan, 80 mg, oral, Nightly      Continuous medications     PRN medications  PRN medications: HYDROmorphone, HYDROmorphone, ondansetron     Labs  Lab Results   Component Value Date    WBC 19.2 (H) 02/25/2025    HGB 11.8 (L) 02/25/2025    HCT 36.5 02/25/2025    MCV 92 02/25/2025     02/25/2025     Lab Results   Component Value Date    GLUCOSE 215 (H) 02/25/2025    CALCIUM 9.1 02/25/2025     02/25/2025    K 4.0 02/25/2025    CO2 25 02/25/2025     02/25/2025    BUN 25 " (H) 02/25/2025    CREATININE 0.67 02/25/2025     Lab Results   Component Value Date    ALT 76 (H) 02/25/2025    AST 16 02/25/2025    ALKPHOS 81 02/25/2025    BILITOT 0.5 02/25/2025     Lab Results   Component Value Date    IRON 87 09/13/2023    TIBC 376 09/13/2023    FERRITIN 744 (H) 02/23/2025     Lab Results   Component Value Date    INR 1.1 02/22/2025    INR 0.9 02/25/2023    PROTIME 11.9 02/22/2025    PROTIME 10.8 02/25/2023       Radiology  CT A/P without IV contrast 2/22/2025 noting:  Impression:     1.Wall thickening and pericolonic fat stranding of the distal  transverse colon and proximal descending colon at the splenic flexure,  compatible with colitis.  2.Hepatic steatosis.  Signed by Hubert Floyd  Jolly Adame is a 59 y.o. female with PMH of UC on a Apriso, CCY presenting after 1 day of abdominal discomfort with several episodes of BRBPR.  Leukocytosis of 19 on arrival with normal Hgb.  CT noting pericolonic fat stranding in distal transverse and proximal ascending colon.  She was started on Zosyn as well as IV Solu-Medrol.  Symptoms continue to improve. Tolerating diet.    # ulcerative colitis flare  # leukocytosis  # transaminitis/ hyperbilirubinemia- normalizing  # elevated CRP  # previous CCY    Plan:  - continue supportive care  - diet as tolerated ok from GI standpoint  - please transition to oral prednisone 40 mg daily x 7 days, decrease by 5 mg weekly until follow-up  - continue antibiotics per primary team/ID recs  - continue daily PPI  - pt will establish care in GI clinic    Plan has been discussed with Dr. Hwang. GI will sign off.     Diane Potter, APRN/CNP

## 2025-02-25 NOTE — DISCHARGE SUMMARY
Discharge Diagnosis  Ulcerative colitis, acute, with rectal bleeding (Multi)    Issues Requiring Follow-Up  It was a pleasure to meet you in the hospital  You were diagnosed as having a flareup of ulcerative colitis  Although it was unclear at first, you were worked up for the potential of having an infection and treated with an IV antibiotic  Test of your stool showed no pathogens that were unexpected and the antibiotics are safely discontinued  As part of the treatment for the flareup of ulcerative colitis, you were started on an IV steroid while you are in the hospital, you will be converted to an oral version of the steroid called prednisone, information on this drug is included in your discharge packet information  You will start on 40 mg of prednisone daily, you will take this for a week, every week you will decrease the amount of prednisone by 5 mg; ultimately, you can stop after you have completed a week of 5 mg of prednisone; in total, this is a prolonged taper for several months   You will follow-up with gastroenterology within a few weeks, they will assist you in managing the taper of prednisone as well for the ulcerative colitis flare  It is prudent that you follow-up with a gastroenterologist, you may be a candidate for more targeted treatments for ulcerative colitis long-term, in the meantime you should continue taking your mesalamine as you previously were  It is also a good idea to follow-up with your primary care physician within 2 weeks of discharge from this hospital stay       Test Results Pending At Discharge  Pending Labs       Order Current Status    Cryptosporidium Antigen, Stool In process    Giardia Antigen In process    Ova and Parasite Examination In process    Ova/Para + Giardia/Cryptosporidium Antigen In process    Blood Culture Preliminary result    Blood Culture Preliminary result          Hospital Course  This is a very pleasant 59-year-old lady with a known history of ulcerative  colitis who presented with abdominal pain and bloody bowel movements after coming back from vacation in [place], symptoms congruent with Ulcerative colitis flare, stool pathogen panel negative, ruling out the likelihood of this being an infectious colitis, she was treated with IV antibiotics while she was in the hospital but will be sent home with a steroid taper and to resume her home mesalamine; patient was feeling drastically improved by the day of discharge, she was given the above-mentioned instructions.  Greater than 30 minutes was spent facilitating this patients discharge from the hospital which included examining the patient, reconciling medications, and making arrangements for future care.    Brady Bright MD  [facility]  Internal Medicine    This document was generated in whole or in part using the Dragon One medical voice recognition software and there may be some incorrect words/wording, spelling, or punctuation errors that were not corrected prior to finalization in the medical record.    Pertinent Physical Exam At Time of Discharge  Physical Exam  Constitutional: A&Ox4, NAD, resting comfortable   Head and Face: Atraumatic, normocephalic   Eyes: Normal external exam  ENT: Normal external inspection of ears and nose. Oropharynx normal.  Cardiovascular: tachy, normal rate, S1/S2, no murmurs, rubs, or gallops, radial pulses +2  Pulmonary: CTAB, no respiratory distress, no wheezing, rales or rhonchi, on RA  Abdomen: +BS, soft, Mild TTP in periumbilical region, nondistended, no guarding rigidity or rebound tenderness, no masses noted  MSK: Negative for edema, No joint swelling, normal movements of all extremities.   Neuro: No focal deficits, normal motor function, normal sensation, follows all commands  Skin- No lesions, contusions, or erythema.  Psychiatric: Judgment intact. Appropriate mood, affect and behavior     Home Medications     Medication List      ASK your doctor about these  medications     ascorbic acid 500 mg tablet; Commonly known as: Vitamin C   busPIRone 15 mg tablet; Commonly known as: Buspar; TAKE ONE-HALF TABLET   BY MOUTH DAILY   cholecalciferol 50 mcg (2,000 unit) capsule; Commonly known as: Vitamin   D-3   colesevelam 625 mg tablet; Commonly known as: Welchol   dilTIAZem  mg 24 hr capsule; Commonly known as: Cardizem CD; TAKE   ONE CAPSULE BY MOUTH EVERY DAY   EpiPen 0.3 mg/0.3 mL injection syringe; Generic drug: EPINEPHrine   hydrocortisone 2.5 % ointment; Apply topically 2 times a day as needed   for irritation or rash.   melatonin 10 mg tablet   mesalamine ER 0.375 gram 24 hr capsule; Commonly known as: Apriso; Take   4 capsules (1.5 g) by mouth once daily. Do not crush or chew.   metoprolol succinate XL 25 mg 24 hr tablet; Commonly known as:   Toprol-XL; TAKE ONE TABLET BY MOUTH EVERY MORNING AND TAKE 1 TABLET BEFORE   BEDTIME.  DO NOT CRUSH OR CHEW   MULTI VITAMIN ORAL   nortriptyline 50 mg capsule; Commonly known as: Pamelor; TAKE 2 CAPSULES   BY MOUTH EVERY NIGHT   omeprazole 20 mg capsule,delayed release(DR/EC); Commonly known as:   PriLOSEC   simvastatin 20 mg tablet; Commonly known as: Zocor; TAKE ONE TABLET BY   MOUTH AT BEDTIME   valsartan 80 mg tablet; Commonly known as: Diovan; Take 1 tablet (80 mg)   by mouth once daily.; Ask about: Which instructions should I use?       Outpatient Follow-Up  Future Appointments   Date Time Provider Department Center   3/13/2025  3:00 PM Av Hunter MD [id number] [facility]   3/17/2025 10:00 AM Berna Hwang MD [id number] [facility]   7/11/2025  1:00 PM Lynn Barrios APRN-CNP [id number] [facility]       Brady Bright MD

## 2025-02-25 NOTE — DISCHARGE INSTRUCTIONS
It was a pleasure to meet you in the hospital  You were diagnosed as having a flareup of ulcerative colitis  Although it was unclear at first, you were worked up for the potential of having an infection and treated with an IV antibiotic  Test of your stool showed no pathogens that were unexpected and the antibiotics are safely discontinued  As part of the treatment for the flareup of ulcerative colitis, you were started on an IV steroid while you are in the hospital, you will be converted to an oral version of the steroid called prednisone, information on this drug is included in your discharge packet information  You will start on 40 mg of prednisone daily, you will take this for a week, every week you will decrease the amount of prednisone by 5 mg; ultimately, you can stop after you have completed a week of 5 mg of prednisone; in total, this is a prolonged taper for several months   You will follow-up with gastroenterology within a few weeks, they will assist you in managing the taper of prednisone as well for the ulcerative colitis flare  It is prudent that you follow-up with a gastroenterologist, you may be a candidate for more targeted treatments for ulcerative colitis long-term, in the meantime you should continue taking your mesalamine as you previously were  It is also a good idea to follow-up with your primary care physician within 2 weeks of discharge from this hospital stay

## 2025-02-26 ENCOUNTER — PATIENT OUTREACH (OUTPATIENT)
Dept: PRIMARY CARE | Facility: CLINIC | Age: 60
End: 2025-02-26
Payer: COMMERCIAL

## 2025-02-26 NOTE — PROGRESS NOTES
[place] [facility] [place]  Discharge diagnosis: Ulcerative colitis, acute, with rectal bleeding     Issues Requiring Follow-Up  It was a pleasure to meet you in the hospital  You were diagnosed as having a flareup of ulcerative colitis  Although it was unclear at first, you were worked up for the potential of having an infection and treated with an IV antibiotic  Test of your stool showed no pathogens that were unexpected and the antibiotics are safely discontinued  As part of the treatment for the flareup of ulcerative colitis, you were started on an IV steroid while you are in the hospital, you will be converted to an oral version of the steroid called prednisone, information on this drug is included in your discharge packet information  You will start on 40 mg of prednisone daily, you will take this for a week, every week you will decrease the amount of prednisone by 5 mg; ultimately, you can stop after you have completed a week of 5 mg of prednisone; in total, this is a prolonged taper for several months   You will follow-up with gastroenterology within a few weeks, they will assist you in managing the taper of prednisone as well for the ulcerative colitis flare  It is prudent that you follow-up with a gastroenterologist, you may be a candidate for more targeted treatments for ulcerative colitis long-term, in the meantime you should continue taking your mesalamine as you previously were  It is also a good idea to follow-up with your primary care physician within 2 weeks of discharge from this hospital stay      Admission date: 2/23/25  Discharge date: 2/25/25    PCP Appointment Date: 3/10/25  Specialist Appointment Date: 3/10/25 GI, 3/13/25 urology  Hospital Encounter and Summary: Linked   ED to Hosp-Admission (Discharged) with Brady Bright MD; Van Barba DO (02/22/2025)     See Discharge assessment below for further details    Wrap Up  Wrap Up Additional Comments: Spoke with patient.  She feels she is doing ok. She continues with diarrhea. She has not seen any further blood. She is taking prednisone as prescribed. Reports she is drinking a lot of fluids. Encouraged smaller meals more frequently as her body can tolerate. She has follow up with GI scheduled. No concerns at this time. (2/26/2025  2:11 PM)    Medications  Medications reviewed with patient/caregiver?: Yes (2/26/2025  2:11 PM)  Is the patient having any side effects they believe may be caused by any medication additions or changes?: No (2/26/2025  2:11 PM)  Does the patient have all medications ordered at discharge?: Yes (2/26/2025  2:11 PM)  Care Management Interventions: Provided patient education (2/26/2025  2:11 PM)  Prescription Comments: Prescriptionss from [facility]  for predniSONE, from [facility] [address]  for  valsartan (2/26/2025  2:11 PM)  Is the patient taking all medications as directed (includes completed medication regime)?: Yes (2/26/2025  2:11 PM)  Care Management Interventions: Provided patient education (2/26/2025  2:11 PM)  Medication Comments: Instructed new medicaton of predniSONE 10 mg tablet  Commonly known as: Deltasone  Start taking on: February 25, 2025  Take 4 tablets (40 mg) by mouth once daily for 7  days, THEN 3.5 tablets (35 mg) once daily for 7  days, THEN 3 tablets (30 mg) once daily for 7  days, THEN 2.5 tablets (25 mg) once daily for 7  days, THEN 2 tablets (20 mg) once daily for 7  days, THEN 1.5 tablets (15 mg) once daily for 7  days, THEN 1 tablet (10 mg) once daily for 7  days, THEN 0.5 tablets (5 mg) once daily for 7  days (2/26/2025  2:11 PM)  Follow Up Tasks: -- (n/a) (2/26/2025  2:11 PM)    Appointments  Does the patient have a primary care provider?: Yes (2/26/2025  2:11 PM)  Care Management Interventions: Verified appointment date/time/provider (3/10/25) (2/26/2025  2:11 PM)  Has the patient kept scheduled appointments due by today?:  Yes (2/26/2025  2:11 PM)  Care Management Interventions: Educated on importance of keeping appointment; Advised patient to keep appointment (2/26/2025  2:11 PM)  Follow Up Tasks: -- (n/a) (2/26/2025  2:11 PM)    Self Management  What is the home health agency?: n/a (2/26/2025  2:11 PM)  Has home health visited the patient within 72 hours of discharge?: Not applicable (2/26/2025  2:11 PM)  Home Health Interventions: -- (n/a) (2/26/2025  2:11 PM)  What Durable Medical Equipment (DME) was ordered?: n/a (2/26/2025  2:11 PM)  Has all Durable Medical Equipment (DME) been delivered?: -- (n/a) (2/26/2025  2:11 PM)  DME Interventions: -- (n/a) (2/26/2025  2:11 PM)  Care Management Interventions: Notified PCP/provider (2/26/2025  2:11 PM)  Follow Up Tasks: -- (n/a) (2/26/2025  2:11 PM)    Patient Teaching  Does the patient have access to their discharge instructions?: Yes (2/26/2025  2:11 PM)  Care Management Interventions: Reviewed instructions with patient (2/26/2025  2:11 PM)  What is the patient's perception of their health status since discharge?: Improving (2/26/2025  2:11 PM)  Is the patient/caregiver able to teach back the hierarchy of who to call/visit for symptoms/problems? PCP, Specialist, Home Health nurse, Urgent Care, ED, 911: Yes (2/26/2025  2:11 PM)  Patient/Caregiver Education Comments: Instructed on hospital discharge instructions. Instructed on new and changed medications. Instructed if increased shortness of breath or chest pains to call 911. Provided my contact information and encouraged to call with any questions (2/26/2025  2:11 PM)

## 2025-02-27 ENCOUNTER — TELEPHONE (OUTPATIENT)
Dept: GASTROENTEROLOGY | Facility: CLINIC | Age: 60
End: 2025-02-27
Payer: COMMERCIAL

## 2025-02-27 LAB
BACTERIA BLD CULT: NORMAL
BACTERIA BLD CULT: NORMAL

## 2025-03-03 LAB — O+P STL MICRO: NEGATIVE

## 2025-03-03 NOTE — PROGRESS NOTES
Jolly Adame is a 59 y.o. female with past medical history of  HTN, hyperlipidemia, and cholecystectomy 1995 who presents today for ulcerative colitis. Previously followed with Roxana Child NP. Initially diagnosed with UC at age 20. Initially treated with Asacol which worked well. Steroids intermittently for flares - has been about 8 years.      1995 Hospitalized for severe flare during lots of stress.    6/2024 Seen, taking Apriso 1.5 g daily. States that she occasionally takes WelChol if she has severe diarrhea but it has been months since she has had to do this (typically once or twice per year). Alternating diarrhea and constipation at baseline. May skip a few days and then have BM. Rarely has BM daily. Occasional diarrhea but stools mainly formed. No blood in stool. Random abdominal pain episodes that is described as mild and improves with defecation. Not frequent. No nausea, vomiting. Takes omeprazole 20 mg for reflux which works well.     Last colonoscopy 12/2023 normal. All biopsies with normal colonic mucosa. Colonoscopy prior to that was around 2020 in [place] (no records). Reportedly normal. Surgical history includes tonsillectomy, hysterectomy, and cholecystectomy.    2/2025 Hospitalized for UC flare requiring IV steroids.abdominal pain and bloody bowel movements after coming back from vacation in [place], symptoms congruent with Ulcerative colitis flare, stool pathogen panel negative, ruling out the likelihood of this being an infectious colitis, she was treated with IV antibiotics. Discharged on prednisone 40 mg taper.    Presents today for follow up.     Social history: No tobacco. Some alcohol a few days a week. Denies illicit drug use.     Family history: Sister has Crohn's disease. Other sister has IBS. Believes maternal grandfather had colon cancer.     Past Medical History:   Diagnosis Date    Abnormal MRI, shoulder 10/29/2022    Nondisplaced greater tuberosity left proximal humeral fracture.   Multiple foci of rotator cuff calcific tendinitis.  Small amount of bursal surface fraying of the distal supraspinatus tendon without evidence of full-thickness rotator cuff tear    Anxiety     Depression     GERD (gastroesophageal reflux disease)     Hyperlipidemia     Hypertension     Seasonal allergies      No past surgical history on file.  Current Outpatient Medications   Medication Sig Dispense Refill    ascorbic acid (Vitamin C) 500 mg tablet Take 1 tablet (500 mg) by mouth once daily.      busPIRone (Buspar) 15 mg tablet TAKE ONE-HALF TABLET BY MOUTH DAILY 90 tablet 0    cholecalciferol (Vitamin D-3) 50 mcg (2,000 unit) capsule Take 1 capsule (50 mcg) by mouth once daily.      colesevelam (Welchol) 625 mg tablet Take 1 tablet (625 mg) by mouth if needed (diarrhea flare up).      dilTIAZem CD (Cardizem CD) 120 mg 24 hr capsule TAKE ONE CAPSULE BY MOUTH EVERY DAY (Patient taking differently: Take 1 capsule (120 mg) by mouth once daily at noon. Take with meals.) 90 capsule 1    EPINEPHrine (EpiPen) 0.3 mg/0.3 mL injection syringe Inject as directed if needed. Inject into upper leg. Call 911 after use.      hydrocortisone 2.5 % ointment Apply topically 2 times a day as needed for irritation or rash. (Patient not taking: Reported on 2/23/2025) 453.6 g 2    melatonin 10 mg tablet Take 1 tablet (10 mg) by mouth once daily at bedtime.      mesalamine ER (Apriso) 0.375 gram 24 hr capsule Take 4 capsules (1.5 g) by mouth once daily. Do not crush or chew. (Patient taking differently: Take 4 capsules (1.5 g) by mouth once daily at noon. Take with meals. Do not crush or chew.) 360 capsule 3    metoprolol succinate XL (Toprol-XL) 25 mg 24 hr tablet TAKE ONE TABLET BY MOUTH EVERY MORNING AND TAKE 1 TABLET BEFORE BEDTIME.  DO NOT CRUSH OR CHEW (Patient taking differently: Take 2 tablets (50 mg) by mouth once daily at bedtime.) 60 tablet 11    multivit-min/ferrous fumarate (MULTI VITAMIN ORAL) Take 1 tablet by mouth once  daily.      nortriptyline (Pamelor) 50 mg capsule TAKE 2 CAPSULES BY MOUTH EVERY NIGHT 60 capsule 11    omeprazole (PriLOSEC) 20 mg capsule,delayed release(DR/EC) Take 1 capsule (20 mg) by mouth once daily at noon. Take with meals. Patient requires brand name Prilosec      predniSONE (Deltasone) 10 mg tablet Take 4 tablets (40 mg) by mouth once daily for 7 days, THEN 3.5 tablets (35 mg) once daily for 7 days, THEN 3 tablets (30 mg) once daily for 7 days, THEN 2.5 tablets (25 mg) once daily for 7 days, THEN 2 tablets (20 mg) once daily for 7 days, THEN 1.5 tablets (15 mg) once daily for 7 days, THEN 1 tablet (10 mg) once daily for 7 days, THEN 0.5 tablets (5 mg) once daily for 7 days. 126 tablet 0    simvastatin (Zocor) 20 mg tablet TAKE ONE TABLET BY MOUTH AT BEDTIME (Patient taking differently: Take 1 tablet (20 mg) by mouth once daily at noon. Take with meals.) 30 tablet 11    valsartan (Diovan) 80 mg tablet Take 1 tablet (80 mg) by mouth once daily. 90 tablet 3     No current facility-administered medications for this visit.     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    Bee Venom Protein (Honey Bee) Swelling    Erythromycin Unknown     Infant reaction    Ibuprofen Rash    Iodinated Contrast Media Nausea/vomiting    Paxlovid (Eua) [Nirmatrelvir-Ritonavir] Rash, Fever and Nausea/vomiting     Family History   Problem Relation Name Age of Onset    Stroke Mother      Dementia Mother      Heart attack Mother      Diabetes Sister      Crohn's disease Sister      Irritable bowel syndrome Sister      Cancer Daughter      Breast cancer Maternal Grandmother      Colon cancer Maternal Grandfather      Breast cancer Paternal Grandmother      Breast cancer Mother's Sister      Breast cancer Mother's Sister         Review of Systems  Review of Systems negative except as noted in HPI.    Objective     There were no vitals taken for this visit.      Physical Exam  Constitutional:  No acute distress. Normal appearance. Not ill-appearing.  HENT:  Head normocephalic and atraumatic. Conjunctivae normal.  Cardiovascular:  Normal rate. Regular rhythm.  Pulmonary:  Pulmonary effort normal. No respiratory distress. Breath sounds clear.  Abdominal:  Abdomen is flat and soft. There is no distension. No tenderness or guarding.  Skin: Dry.  Neurological:  Alert and oriented.  Psychiatric:  Mood and affect normal.    Assessment/Plan     9 y.o. female with history of HTN, hyperlipidemia, and cholecystectomy 1995 who presents today for clinic visit for UC. She has had UC diagnosed age 20. Treated with oral mesalamine with good improvement. She has not had a flare in many years and her last 2 colonoscopies 2020 and 2023 show a deep remission with no evidence of IBD.      Discussed attempting to lower mesalamine dose versus continuing with current medication regimen. She would like to continue at her current dose as her sister has Crohn's disease requiring biologic and she would like to prevent any flares/potential worsening of her disease.     Recommendations  Obtain labs.  Continue Apriso.  As long as you continue to do well follow up in 1 year.     Health maintenance  - T spot:  - Shingrix:  - Pneumonia:  - Influenza:   - Covid:  - Hepatitis B:  - Dermatology:  - Pap smear:  - DEXA:  - Eye exam:    Electronically signed by: Lynn Barrios CNP on 3/3/2025 at 9:07 AM

## 2025-03-05 NOTE — PROGRESS NOTES
Jolly Adame is a 59 y.o. female with past medical history of  HTN, hyperlipidemia, and cholecystectomy 1995 who presents today for ulcerative colitis. Previously followed with Roxana Child NP. Diagnosed with UC age 20. Initially treated with Asacol which worked well. Steroids intermittently for flares.     1995 Hospitalized for severe flare during lots of stress. After this did very well for many years on Apriso. Did not have any flares or need steroids for over a decade. At baseline alternating diarrhea and constipation. May skip a few days and then have BM. Occasional diarrhea, mild infrequent abdominal pain. Omeprazole 20 mg for reflux.     12/2023 Colonoscopy normal. All biopsies normal. Colonoscopy prior to that was around 2020 in [place] (no records). Reportedly normal.     2/2025 Hospitalized for UC flare with severe abdominal pain and over 10 bloody bowel movements after returning from [place] (however symptoms started 10 days after returning home). Given IV steroids and antibiotics although stool pathogen panel negative. Discharged on prednisone 40 mg taper.    Presents today for hospital follow up. Continues Apriso 4 per day. Currently at 35 mg prednisone. She is back to baseline. Skipping a couple days and then will have a few formed BM, about 4-5. No longer seeing blood. Pain and nausea resolved. She is back to regular diet. She lost 5 pounds in hospital but has gained this back.    Surgical history: Tonsillectomy, hysterectomy, and cholecystectomy.     Social history: No tobacco. Some alcohol a few days a week. Denies illicit drug use.     Family history: Sister has Crohn's disease. Other sister has IBS. Believes maternal grandfather had colon cancer.     Past Medical History:   Diagnosis Date    Abnormal MRI, shoulder 10/29/2022    Nondisplaced greater tuberosity left proximal humeral fracture.  Multiple foci of rotator cuff calcific tendinitis.  Small amount of bursal surface fraying of the  distal supraspinatus tendon without evidence of full-thickness rotator cuff tear    Anxiety     Depression     GERD (gastroesophageal reflux disease)     Hyperlipidemia     Hypertension     Seasonal allergies      Current Outpatient Medications   Medication Sig Dispense Refill    ascorbic acid (Vitamin C) 500 mg tablet Take 1 tablet (500 mg) by mouth once daily.      busPIRone (Buspar) 15 mg tablet TAKE ONE-HALF TABLET BY MOUTH DAILY 90 tablet 0    cholecalciferol (Vitamin D-3) 50 mcg (2,000 unit) capsule Take 1 capsule (50 mcg) by mouth once daily.      colesevelam (Welchol) 625 mg tablet Take 1 tablet (625 mg) by mouth if needed (diarrhea flare up).      dilTIAZem CD (Cardizem CD) 120 mg 24 hr capsule TAKE ONE CAPSULE BY MOUTH EVERY DAY 90 capsule 1    EPINEPHrine (EpiPen) 0.3 mg/0.3 mL injection syringe Inject as directed if needed. Inject into upper leg. Call 911 after use.      hydrocortisone 2.5 % ointment Apply topically 2 times a day as needed for irritation or rash. 453.6 g 2    melatonin 10 mg tablet Take 1 tablet (10 mg) by mouth once daily at bedtime.      mesalamine ER (Apriso) 0.375 gram 24 hr capsule Take 4 capsules (1.5 g) by mouth once daily. Do not crush or chew. 360 capsule 3    metoprolol succinate XL (Toprol-XL) 25 mg 24 hr tablet TAKE ONE TABLET BY MOUTH EVERY MORNING AND TAKE 1 TABLET BEFORE BEDTIME.  DO NOT CRUSH OR CHEW 60 tablet 11    multivit-min/ferrous fumarate (MULTI VITAMIN ORAL) Take 1 tablet by mouth once daily.      nortriptyline (Pamelor) 50 mg capsule TAKE 2 CAPSULES BY MOUTH EVERY NIGHT 60 capsule 11    omeprazole (PriLOSEC) 20 mg capsule,delayed release(DR/EC) Take 1 capsule (20 mg) by mouth once daily at noon. Take with meals. Patient requires brand name Prilosec      predniSONE (Deltasone) 10 mg tablet Take 4 tablets (40 mg) by mouth once daily for 7 days, THEN 3.5 tablets (35 mg) once daily for 7 days, THEN 3 tablets (30 mg) once daily for 7 days, THEN 2.5 tablets (25 mg)  "once daily for 7 days, THEN 2 tablets (20 mg) once daily for 7 days, THEN 1.5 tablets (15 mg) once daily for 7 days, THEN 1 tablet (10 mg) once daily for 7 days, THEN 0.5 tablets (5 mg) once daily for 7 days. 126 tablet 0    simvastatin (Zocor) 20 mg tablet TAKE ONE TABLET BY MOUTH AT BEDTIME (Patient taking differently: Take 1 tablet (20 mg) by mouth once daily at noon. Take with meals.) 30 tablet 11    valsartan (Diovan) 80 mg tablet Take 1 tablet (80 mg) by mouth once daily. 90 tablet 3     No current facility-administered medications for this visit.       Review of Systems  Review of Systems negative except as noted in HPI.    Objective     /76   Pulse 76   Temp 36.6 °C (97.9 °F)   Ht 1.575 m (5' 2\")   Wt 74.4 kg (164 lb)   BMI 30.00 kg/m²      Physical Exam  Constitutional:  No acute distress. Normal appearance. Not ill-appearing.  HENT:  Head normocephalic and atraumatic. Conjunctivae normal.  Cardiovascular:  Normal rate. Regular rhythm.  Pulmonary:  Pulmonary effort normal. No respiratory distress. Breath sounds clear.  Abdominal:  Abdomen is soft. There is no distension. No tenderness or guarding.  Skin: Dry.  Neurological:  Alert and oriented.  Psychiatric:  Mood and affect normal.    Assessment/Plan     59 y.o. female with history of HTN, hyperlipidemia, and cholecystectomy 1995 who presents today for follow up of UC. UC diagnosed age 20, in long-term clinical and endoscopic remission with Apriso. She did not have flare in about a decade with her last 2 colonoscopies 2020 and 2023 show a deep remission with no evidence of IBD.      Last month she was hospitalized for abdominal pain and bloody diarrhea after returning home from vacation. Has also had some increased stress. Stool pathogens negative. She has clinically improved with prednisone. We will obtain fecal calprotectin, which I expect to be a bit high. She will continue taper and will follow up in 8 weeks. We will plan to recheck fecal " calprotectin at some point in time and as long as she symptomatically improves with normalized FCP we will continue her current medication and plan for repeat colonoscopy 3-5 year interval. However should her symptoms return or stool test remain elevated she will require sooner colonoscopy to re-evaluate disease and guide medication management. This was discussed today and she is in agreement.     Recommendations  Continue Apriso.  Continue prednisone taper (decrease by 5 mg weekly).  Obtain baseline fecal calprotectin stool test.  Follow up in 8 weeks.    Electronically signed by: Lynn Barrios CNP on 3/7/2025 at 10:12 AM

## 2025-03-07 ENCOUNTER — OFFICE VISIT (OUTPATIENT)
Dept: GASTROENTEROLOGY | Facility: CLINIC | Age: 60
End: 2025-03-07
Payer: COMMERCIAL

## 2025-03-07 VITALS
BODY MASS INDEX: 30.18 KG/M2 | DIASTOLIC BLOOD PRESSURE: 76 MMHG | SYSTOLIC BLOOD PRESSURE: 117 MMHG | HEART RATE: 76 BPM | HEIGHT: 62 IN | TEMPERATURE: 97.9 F | WEIGHT: 164 LBS

## 2025-03-07 DIAGNOSIS — K51.819 OTHER ULCERATIVE COLITIS WITH COMPLICATION: Primary | ICD-10-CM

## 2025-03-07 PROCEDURE — 99214 OFFICE O/P EST MOD 30 MIN: CPT | Performed by: NURSE PRACTITIONER

## 2025-03-07 PROCEDURE — 3078F DIAST BP <80 MM HG: CPT | Performed by: NURSE PRACTITIONER

## 2025-03-07 PROCEDURE — 3008F BODY MASS INDEX DOCD: CPT | Performed by: NURSE PRACTITIONER

## 2025-03-07 PROCEDURE — 3074F SYST BP LT 130 MM HG: CPT | Performed by: NURSE PRACTITIONER

## 2025-03-07 ASSESSMENT — PAIN SCALES - GENERAL: PAINLEVEL_OUTOF10: 0-NO PAIN

## 2025-03-07 NOTE — PATIENT INSTRUCTIONS
Recommendations  Continue Apriso.  Continue prednisone taper (decrease by 5 mg weekly).  Obtain fecal calprotectin stool test.  Follow up in 8 weeks.

## 2025-03-10 ENCOUNTER — APPOINTMENT (OUTPATIENT)
Dept: GASTROENTEROLOGY | Facility: HOSPITAL | Age: 60
End: 2025-03-10
Payer: COMMERCIAL

## 2025-03-10 ENCOUNTER — APPOINTMENT (OUTPATIENT)
Dept: PRIMARY CARE | Facility: CLINIC | Age: 60
End: 2025-03-10
Payer: COMMERCIAL

## 2025-03-11 DIAGNOSIS — I10 HYPERTENSION, ESSENTIAL: ICD-10-CM

## 2025-03-11 RX ORDER — DILTIAZEM HYDROCHLORIDE 120 MG/1
120 CAPSULE, COATED, EXTENDED RELEASE ORAL DAILY
Qty: 90 CAPSULE | Refills: 0 | Status: SHIPPED | OUTPATIENT
Start: 2025-03-11

## 2025-03-11 NOTE — TELEPHONE ENCOUNTER
"Refill Request      Last OV with PCP 11/20/2024     Future Appointments       Date / Time Provider Department Dept Phone    3/12/2025 11:00 AM (Arrive by 10:45 AM) Sabina Vitale APRN-CNP  [facility] [phone]    3/13/2025 3:00 PM Av Hunter MD; UROLOGY [id number] PROCEDURE ROOM 2 [facility] [phone]    5/30/2025 11:40 AM Lynn Barrios APRN-CNP [facility] [phone]    7/11/2025 1:00 PM CROW Leahy-CNP [facility] [phone]          Lab Results   Component Value Date    HGBA1C 6.5 (H) 12/13/2024     Lab Results   Component Value Date    CHOL 150 10/02/2024    CHOL 126 02/14/2024    CHOL 146 09/13/2023     Lab Results   Component Value Date    HDL 15.8 10/02/2024    HDL 20.9 02/14/2024    HDL 22.8 (A) 09/13/2023     Lab Results   Component Value Date    LDLCALC 69 10/02/2024    LDLCALC 66 02/14/2024     Lab Results   Component Value Date    TRIG 326 (H) 10/02/2024    TRIG 195 (H) 02/14/2024    TRIG 192 (H) 09/13/2023     No components found for: \"CHOLHDL\"  Lab Results   Component Value Date    TSH 3.09 10/02/2024     Lab Results   Component Value Date    GLUCOSE 215 (H) 02/25/2025    CALCIUM 9.1 02/25/2025     02/25/2025    K 4.0 02/25/2025    CO2 25 02/25/2025     02/25/2025    BUN 25 (H) 02/25/2025    CREATININE 0.67 02/25/2025     "

## 2025-03-12 ENCOUNTER — APPOINTMENT (OUTPATIENT)
Dept: PRIMARY CARE | Facility: CLINIC | Age: 60
End: 2025-03-12
Payer: COMMERCIAL

## 2025-03-12 VITALS
BODY MASS INDEX: 30.18 KG/M2 | HEIGHT: 62 IN | DIASTOLIC BLOOD PRESSURE: 84 MMHG | SYSTOLIC BLOOD PRESSURE: 141 MMHG | TEMPERATURE: 97.6 F | OXYGEN SATURATION: 95 % | HEART RATE: 73 BPM | WEIGHT: 164 LBS

## 2025-03-12 DIAGNOSIS — K51.911: Primary | ICD-10-CM

## 2025-03-12 DIAGNOSIS — I10 HYPERTENSION, ESSENTIAL: ICD-10-CM

## 2025-03-12 DIAGNOSIS — E11.9 TYPE 2 DIABETES MELLITUS WITHOUT COMPLICATION, WITHOUT LONG-TERM CURRENT USE OF INSULIN (MULTI): ICD-10-CM

## 2025-03-12 PROCEDURE — 3077F SYST BP >= 140 MM HG: CPT | Performed by: NURSE PRACTITIONER

## 2025-03-12 PROCEDURE — 3008F BODY MASS INDEX DOCD: CPT | Performed by: NURSE PRACTITIONER

## 2025-03-12 PROCEDURE — 4010F ACE/ARB THERAPY RXD/TAKEN: CPT | Performed by: NURSE PRACTITIONER

## 2025-03-12 PROCEDURE — 99214 OFFICE O/P EST MOD 30 MIN: CPT | Performed by: NURSE PRACTITIONER

## 2025-03-12 PROCEDURE — 3079F DIAST BP 80-89 MM HG: CPT | Performed by: NURSE PRACTITIONER

## 2025-03-12 PROCEDURE — 1036F TOBACCO NON-USER: CPT | Performed by: NURSE PRACTITIONER

## 2025-03-12 NOTE — ASSESSMENT & PLAN NOTE
TCM completed today for hospitalization  Already follow up with GI, no preventative medications given  To continue mesalamine

## 2025-03-12 NOTE — PROGRESS NOTES
"NON BILLABLE TRANSITION OF CARE MANAGEMENT    Patient: Jolly Adame  : 1965  PCP: No primary care provider on file.  MRN: [id number]  Program: No linked episodes    Admit date: 25  Discharge date: 25  Discharge DX: Ulcerative colitis, acute, with rectal bleeding     Jolly Adame is a 59 y.o. female presenting today for follow-up after being discharged from the hospital  15  days ago. The main problem requiring admission was Ulcerative colitis with rectal bleeding. The discharge summary and/or Transitional Care Management documentation was reviewed. Medication reconciliation was performed as indicated via the \"Dave as Reviewed\" timestamp.     Jolly Adame was contacted by Transitional Care Management services two days after her discharge. This encounter and supporting documentation was reviewed.    The complexity of medical decision making for this patient's transitional care is moderate.    Feeling well  Tapering down on prednisone  Is on 30mg  Saw GI last week  Pain, diarrhea and bleeding resolve  GI is not thinking of doing preventative medication  To resume mesalamine  And is to follow up with GI in 8 weeks  BP controlled at home, and at the hospital  SBP 120s         Hospital course copied:   This is a very pleasant 59-year-old lady with a known history of ulcerative colitis who presented with abdominal pain and bloody bowel movements after coming back from vacation in [place], symptoms congruent with Ulcerative colitis flare, stool pathogen panel negative, ruling out the likelihood of this being an infectious colitis, she was treated with IV antibiotics while she was in the hospital but will be sent home with a steroid taper and to resume her home mesalamine; patient was feeling drastically improved by the day of discharge, she was given the above-mentioned instructions.     Labs copied:  Component      Latest Ref Rng 2025   GLUCOSE      74 - 99 mg/dL 236 (H)  287 " (H)  215 (H)    SODIUM      136 - 145 mmol/L 134 (L)  136  137    POTASSIUM      3.5 - 5.3 mmol/L 4.1  4.2  4.0    CHLORIDE      98 - 107 mmol/L 99  102  102    Bicarbonate      21 - 32 mmol/L 26  25  25    Anion Gap      10 - 20 mmol/L 13  13  14    Blood Urea Nitrogen      6 - 23 mg/dL 14  23  25 (H)    Creatinine      0.50 - 1.05 mg/dL 0.69  0.70  0.67    EGFR      >60 mL/min/1.73m*2 >90  >90  >90    Calcium      8.6 - 10.3 mg/dL 9.2  9.1  9.1    Albumin      3.4 - 5.0 g/dL 4.1  3.9  3.9    Alkaline Phosphatase      33 - 110 U/L 117 (H)  97  81    Total Protein      6.4 - 8.2 g/dL 6.6  6.2 (L)  6.2 (L)    AST      9 - 39 U/L 221 (H)  38  16    Bilirubin Total      0.0 - 1.2 mg/dL 1.7 (H)  0.7  0.5    ALT      7 - 45 U/L 191 (H)  110 (H)  76 (H)    WBC      4.4 - 11.3 x10*3/uL 12.7 (H)  18.0 (H)  19.2 (H)    nRBC      0.0 - 0.0 /100 WBCs 0.0  0.0  0.0    RBC      4.00 - 5.20 x10*6/uL 4.54  3.98 (L)  3.98 (L)    HEMOGLOBIN      12.0 - 16.0 g/dL 13.1  11.6 (L)  11.8 (L)    HEMATOCRIT      36.0 - 46.0 % 40.6  36.4  36.5    MCV      80 - 100 fL 89  92  92    MCH      26.0 - 34.0 pg 28.9  29.1  29.6    MCHC      32.0 - 36.0 g/dL 32.3  31.9 (L)  32.3    RED CELL DISTRIBUTION WIDTH      11.5 - 14.5 % 13.4  13.8  13.9    Platelets      150 - 450 x10*3/uL 296  309  332    Hepatitis B Surface AG      Nonreactive  Nonreactive      Hepatitis A  AB- IgM      Nonreactive  Nonreactive      Hepatitis A  AB- IgM      Nonreactive  Nonreactive      Hepatitis B Core AB; IgM      Nonreactive  Nonreactive      Hepatitis C AB      Nonreactive  Nonreactive      Lactate      0.4 - 2.0 mmol/L 1.9      MAGNESIUM      1.60 - 2.40 mg/dL 1.77  2.03  2.26    PHOSPHORUS      2.5 - 4.9 mg/dL 2.7  3.0  3.8    FERRITIN      8 - 150 ng/mL 744 (H)      C-Reactive Protein      <1.00 mg/dL  4.17 (H)        Legend:  (H) High  (L) Low      Imaging copied:   CT Abdomen and Pelvis without IV Contrast; 2/22/2025 at 11:43  PM.  INDICATION:  Pain.  COMPARISON:  CT AP 10/9/2024; US abdomen 2/22/2024.  ACCESSION NUMBER(S):  [id number]  ORDERING CLINICIAN:  MILO CLARK  TECHNIQUE:  CT of the abdomen and pelvis was performed.  Contiguous axial images  were obtained at 3 mm slice thickness through the abdomen and pelvis.   Coronal and sagittal reconstructions at 3 mm slice thickness were  performed. No intravenous contrast was administered.    Automated mA/kV exposure control was utilized and patient examination  was performed in strict accordance with principles of ALARA.  FINDINGS:  Please note that the evaluation of vessels, lymph nodes and organs is  limited without intravenous contrast.      LOWER CHEST:  No cardiomegaly.  No pericardial effusion.  Lung bases are clear.     ABDOMEN:     LIVER:  Diffuse hepatic steatosis.     BILE DUCTS:  No intrahepatic or extrahepatic biliary ductal dilatation.     GALLBLADDER:  The gallbladder is unremarkable.  STOMACH:  No abnormalities identified.     PANCREAS:  No masses or ductal dilatation.     SPLEEN:  No splenomegaly or focal splenic lesion.     ADRENAL GLANDS:  No thickening or nodules.     KIDNEYS AND URETERS:  Kidneys are normal in size and location.  No renal or ureteral  calculi.     PELVIS:     BLADDER:  Decompressed urinary bladder.     REPRODUCTIVE ORGANS:  No abnormalities identified.     BOWEL:  Underdistended bowel limits evaluation.  Wall thickening and  pericolonic fat stranding of the distal transverse colon and proximal  descending colon at the splenic flexure, compatible with colitis.  No  pneumoperitoneum or drainable collection.     VESSELS:  Scattered atherosclerotic vascular calcifications.     PERITONEUM/RETROPERITONEUM/LYMPH NODES:  No free fluid.  No pneumoperitoneum.  No lymphadenopathy.     ABDOMINAL WALL:  No abnormalities identified.  SOFT TISSUES:   No abnormalities identified.     BONES:  No acute fracture or aggressive osseous  lesion.  IMPRESSION:  1.Wall thickening and pericolonic fat stranding of the distal  transverse colon and proximal descending colon at the splenic flexure,  compatible with colitis.  2.Hepatic steatosis.    Review of Systems  ROS completely negative except what was mentioned in the HPI.  Problem List, surgical, social, and family histories which were reviewed and updated as necessary within the EMR. I also personally reviewed the notes, labs, and imaging that pertained to what was documented or discussed in the HPI.      Physical Exam  Vitals and nursing note reviewed.   Constitutional:       General: She is not in acute distress.     Appearance: Normal appearance.   HENT:      Head: Normocephalic and atraumatic.      Right Ear: Tympanic membrane, ear canal and external ear normal.      Left Ear: Tympanic membrane, ear canal and external ear normal.      Nose: Nose normal.      Mouth/Throat:      Mouth: Mucous membranes are moist.      Pharynx: Oropharynx is clear.   Eyes:      Extraocular Movements: Extraocular movements intact.      Conjunctiva/sclera: Conjunctivae normal.      Pupils: Pupils are equal, round, and reactive to light.   Cardiovascular:      Rate and Rhythm: Normal rate and regular rhythm.      Pulses: Normal pulses.      Heart sounds: Normal heart sounds.   Pulmonary:      Effort: Pulmonary effort is normal.      Breath sounds: Normal breath sounds.   Abdominal:      General: Abdomen is flat. Bowel sounds are normal.      Palpations: Abdomen is soft.      Tenderness: There is no abdominal tenderness.   Musculoskeletal:         General: Normal range of motion.      Cervical back: Normal range of motion and neck supple.   Lymphadenopathy:      Cervical: No cervical adenopathy.   Skin:     General: Skin is warm and dry.   Neurological:      General: No focal deficit present.      Mental Status: She is alert and oriented to person, place, and time. Mental status is at baseline.   Psychiatric:          Mood and Affect: Mood normal.         Behavior: Behavior normal.         Thought Content: Thought content normal.         Judgment: Judgment normal.         Problem List Items Addressed This Visit       Hypertension, essential    Current Assessment & Plan     Home SBP have been in the 120s  Monitor         Relevant Orders    CBC and Auto Differential    Comprehensive metabolic panel    Hemoglobin A1c    Type 2 diabetes mellitus without complication, without long-term current use of insulin (Multi)    Overview     10/2/24:  A1C = 6.3  12/13/24: A1C = 6.5  2/25/25 - 4/22/25 on long steroid taper for UC         Current Assessment & Plan     Recheck today and in 3 mo well after steroid taper completed         Relevant Orders    CBC and Auto Differential    Comprehensive metabolic panel    Hemoglobin A1c    Ulcerative colitis, acute, with rectal bleeding (Multi) - Primary    Current Assessment & Plan     TCM completed today for hospitalization  Already follow up with GI, no preventative medications given  To continue mesalamine         Relevant Orders    CBC and Auto Differential    Comprehensive metabolic panel    Hemoglobin A1c        Family History   Problem Relation Name Age of Onset    Stroke Mother      Dementia Mother      Heart attack Mother      Diabetes Sister      Crohn's disease Sister      Irritable bowel syndrome Sister      Cancer Daughter      Breast cancer Maternal Grandmother      Colon cancer Maternal Grandfather      Breast cancer Paternal Grandmother      Breast cancer Mother's Sister      Breast cancer Mother's Sister         No data recorded    No follow-ups on file.

## 2025-03-13 ENCOUNTER — APPOINTMENT (OUTPATIENT)
Dept: UROLOGY | Facility: CLINIC | Age: 60
End: 2025-03-13
Payer: COMMERCIAL

## 2025-03-13 VITALS — DIASTOLIC BLOOD PRESSURE: 75 MMHG | HEART RATE: 94 BPM | SYSTOLIC BLOOD PRESSURE: 118 MMHG | TEMPERATURE: 96.9 F

## 2025-03-13 DIAGNOSIS — R31.9 HEMATURIA, UNSPECIFIED TYPE: Primary | ICD-10-CM

## 2025-03-13 LAB
ALBUMIN SERPL-MCNC: 4.2 G/DL (ref 3.6–5.1)
ALBUMIN/GLOB SERPL: 2.1 (CALC) (ref 1–2.5)
ALP SERPL-CCNC: 84 U/L (ref 37–153)
ALT SERPL-CCNC: 62 U/L (ref 6–29)
AST SERPL-CCNC: 17 U/L (ref 10–35)
BILIRUB DIRECT SERPL-MCNC: 0.1 MG/DL
BILIRUB INDIRECT SERPL-MCNC: 0.5 MG/DL (CALC) (ref 0.2–1.2)
BILIRUB SERPL-MCNC: 0.6 MG/DL (ref 0.2–1.2)
EST. AVERAGE GLUCOSE BLD GHB EST-MCNC: 166 MG/DL
EST. AVERAGE GLUCOSE BLD GHB EST-SCNC: 9.2 MMOL/L
GLOBULIN SER CALC-MCNC: 2 G/DL (CALC) (ref 1.9–3.7)
HBA1C MFR BLD: 7.4 % OF TOTAL HGB
POC APPEARANCE, URINE: CLEAR
POC BILIRUBIN, URINE: NEGATIVE
POC BLOOD, URINE: ABNORMAL
POC COLOR, URINE: YELLOW
POC GLUCOSE, URINE: ABNORMAL MG/DL
POC KETONES, URINE: NEGATIVE MG/DL
POC LEUKOCYTES, URINE: NEGATIVE
POC NITRITE,URINE: NEGATIVE
POC PH, URINE: 6 PH
POC PROTEIN, URINE: NEGATIVE MG/DL
POC SPECIFIC GRAVITY, URINE: >=1.03
POC UROBILINOGEN, URINE: 0.2 EU/DL
PROT SERPL-MCNC: 6.2 G/DL (ref 6.1–8.1)

## 2025-03-13 PROCEDURE — 81003 URINALYSIS AUTO W/O SCOPE: CPT | Performed by: UROLOGY

## 2025-03-13 PROCEDURE — 52000 CYSTOURETHROSCOPY: CPT | Performed by: UROLOGY

## 2025-03-14 ENCOUNTER — PATIENT OUTREACH (OUTPATIENT)
Dept: PRIMARY CARE | Facility: CLINIC | Age: 60
End: 2025-03-14
Payer: COMMERCIAL

## 2025-03-17 ENCOUNTER — APPOINTMENT (OUTPATIENT)
Dept: GASTROENTEROLOGY | Facility: CLINIC | Age: 60
End: 2025-03-17
Payer: COMMERCIAL

## 2025-03-20 LAB — CALPROTECTIN STL-MCNT: 77 MCG/G

## 2025-04-02 DIAGNOSIS — E78.5 HYPERLIPIDEMIA, UNSPECIFIED HYPERLIPIDEMIA TYPE: ICD-10-CM

## 2025-04-02 RX ORDER — SIMVASTATIN 20 MG/1
20 TABLET, FILM COATED ORAL NIGHTLY
Qty: 90 TABLET | Refills: 3 | Status: SHIPPED | OUTPATIENT
Start: 2025-04-02

## 2025-04-02 NOTE — TELEPHONE ENCOUNTER
"Refill Request      Last OV with PCP 3/12/2025     Future Appointments       Date / Time Provider Department Dept Phone    5/16/2025 10:20 AM DANIELLE Leahy [facility] [phone]    7/11/2025 1:00 PM DANIELLE Leahy [facility] [phone]    7/14/2025 1:00 PM (Arrive by 12:45 PM) Rozina Raza MD  [facility] [phone]          Lab Results   Component Value Date    HGBA1C 7.4 (H) 03/12/2025     Lab Results   Component Value Date    CHOL 150 10/02/2024    CHOL 126 02/14/2024    CHOL 146 09/13/2023     Lab Results   Component Value Date    HDL 15.8 10/02/2024    HDL 20.9 02/14/2024    HDL 22.8 (A) 09/13/2023     Lab Results   Component Value Date    LDLCALC 69 10/02/2024    LDLCALC 66 02/14/2024     Lab Results   Component Value Date    TRIG 326 (H) 10/02/2024    TRIG 195 (H) 02/14/2024    TRIG 192 (H) 09/13/2023     No components found for: \"CHOLHDL\"  Lab Results   Component Value Date    TSH 3.09 10/02/2024     Lab Results   Component Value Date    GLUCOSE 215 (H) 02/25/2025    CALCIUM 9.1 02/25/2025     02/25/2025    K 4.0 02/25/2025    CO2 25 02/25/2025     02/25/2025    BUN 25 (H) 02/25/2025    CREATININE 0.67 02/25/2025      "

## 2025-04-09 ENCOUNTER — PATIENT OUTREACH (OUTPATIENT)
Dept: PRIMARY CARE | Facility: CLINIC | Age: 60
End: 2025-04-09
Payer: COMMERCIAL

## 2025-04-10 ENCOUNTER — TELEPHONE (OUTPATIENT)
Dept: GASTROENTEROLOGY | Facility: HOSPITAL | Age: 60
End: 2025-04-10
Payer: COMMERCIAL

## 2025-04-10 DIAGNOSIS — K51.819 OTHER ULCERATIVE COLITIS WITH COMPLICATION: Primary | ICD-10-CM

## 2025-04-10 RX ORDER — PREDNISONE 10 MG/1
TABLET ORAL
Qty: 35 TABLET | Refills: 0 | Status: SHIPPED | OUTPATIENT
Start: 2025-04-10 | End: 2025-05-08

## 2025-04-10 NOTE — TELEPHONE ENCOUNTER
Pt is in need of medical advice due to reaction from prednisone. Pt has been experiencing nausea and diarrhea and would like provider to give her a call.

## 2025-04-17 DIAGNOSIS — Z91.030 BEE STING ALLERGY: ICD-10-CM

## 2025-04-17 RX ORDER — EPINEPHRINE 0.3 MG/.3ML
1 INJECTION INTRAMUSCULAR AS NEEDED
Qty: 0.3 ML | Refills: 1 | Status: SHIPPED | OUTPATIENT
Start: 2025-04-17

## 2025-04-17 NOTE — TELEPHONE ENCOUNTER
Greetings, My epi-Pen has .  Can you send a new prescription?  We found out we will be working with bees Friday in [place].     I am Currently in [place],  I am thinking that the easiest thing to do is once the prescription hits the [facility] in [place], I can call the Pharmacy in [place] and have it transferred to the [facility] at [address] in [place].  If you could send it To [place], the  phone number is [phone].     Thank you in advance,     Jolly

## 2025-04-24 DIAGNOSIS — F41.9 ANXIETY: ICD-10-CM

## 2025-04-24 RX ORDER — BUSPIRONE HYDROCHLORIDE 15 MG/1
7.5 TABLET ORAL DAILY
Qty: 90 TABLET | Refills: 3 | Status: SHIPPED | OUTPATIENT
Start: 2025-04-24

## 2025-04-24 NOTE — TELEPHONE ENCOUNTER
"Refill Request      Last OV with PCP 3/12/2025     Future Appointments       Date / Time Provider Department Dept Phone    5/16/2025 10:20 AM DANIELLE Leahy [facility] [phone]    7/11/2025 1:00 PM CROW Leahy-CNP [facility] [phone]    7/16/2025 4:00 PM (Arrive by 3:45 PM) Rozina Raza MD  [facility] [phone]          Lab Results   Component Value Date    HGBA1C 7.4 (H) 03/12/2025     Lab Results   Component Value Date    CHOL 150 10/02/2024    CHOL 126 02/14/2024    CHOL 146 09/13/2023     Lab Results   Component Value Date    HDL 15.8 10/02/2024    HDL 20.9 02/14/2024    HDL 22.8 (A) 09/13/2023     Lab Results   Component Value Date    LDLCALC 69 10/02/2024    LDLCALC 66 02/14/2024     Lab Results   Component Value Date    TRIG 326 (H) 10/02/2024    TRIG 195 (H) 02/14/2024    TRIG 192 (H) 09/13/2023     No components found for: \"CHOLHDL\"  Lab Results   Component Value Date    TSH 3.09 10/02/2024     Lab Results   Component Value Date    GLUCOSE 215 (H) 02/25/2025    CALCIUM 9.1 02/25/2025     02/25/2025    K 4.0 02/25/2025    CO2 25 02/25/2025     02/25/2025    BUN 25 (H) 02/25/2025    CREATININE 0.67 02/25/2025       "

## 2025-05-01 ENCOUNTER — APPOINTMENT (OUTPATIENT)
Dept: RADIOLOGY | Facility: HOSPITAL | Age: 60
End: 2025-05-01
Payer: COMMERCIAL

## 2025-05-01 ENCOUNTER — HOSPITAL ENCOUNTER (EMERGENCY)
Facility: HOSPITAL | Age: 60
Discharge: HOME | End: 2025-05-01
Attending: EMERGENCY MEDICINE
Payer: COMMERCIAL

## 2025-05-01 VITALS
DIASTOLIC BLOOD PRESSURE: 71 MMHG | TEMPERATURE: 97.7 F | WEIGHT: 160 LBS | HEART RATE: 88 BPM | RESPIRATION RATE: 20 BRPM | SYSTOLIC BLOOD PRESSURE: 138 MMHG | BODY MASS INDEX: 28.35 KG/M2 | OXYGEN SATURATION: 98 % | HEIGHT: 63 IN

## 2025-05-01 DIAGNOSIS — N12 PYELONEPHRITIS: Primary | ICD-10-CM

## 2025-05-01 DIAGNOSIS — N30.91 HEMORRHAGIC CYSTITIS: ICD-10-CM

## 2025-05-01 LAB
ALBUMIN SERPL BCP-MCNC: 4.8 G/DL (ref 3.4–5)
ALP SERPL-CCNC: 63 U/L (ref 33–110)
ALT SERPL W P-5'-P-CCNC: 60 U/L (ref 7–45)
ANION GAP SERPL CALC-SCNC: 14 MMOL/L (ref 10–20)
APPEARANCE UR: ABNORMAL
AST SERPL W P-5'-P-CCNC: 32 U/L (ref 9–39)
BASOPHILS # BLD AUTO: 0.06 X10*3/UL (ref 0–0.1)
BASOPHILS NFR BLD AUTO: 0.5 %
BILIRUB SERPL-MCNC: 0.7 MG/DL (ref 0–1.2)
BILIRUB UR STRIP.AUTO-MCNC: NEGATIVE MG/DL
BUN SERPL-MCNC: 11 MG/DL (ref 6–23)
CALCIUM SERPL-MCNC: 10 MG/DL (ref 8.6–10.3)
CHLORIDE SERPL-SCNC: 100 MMOL/L (ref 98–107)
CO2 SERPL-SCNC: 28 MMOL/L (ref 21–32)
COLOR UR: ABNORMAL
CREAT SERPL-MCNC: 0.8 MG/DL (ref 0.5–1.05)
EGFRCR SERPLBLD CKD-EPI 2021: 86 ML/MIN/1.73M*2
EOSINOPHIL # BLD AUTO: 0.17 X10*3/UL (ref 0–0.7)
EOSINOPHIL NFR BLD AUTO: 1.4 %
ERYTHROCYTE [DISTWIDTH] IN BLOOD BY AUTOMATED COUNT: 13.1 % (ref 11.5–14.5)
GLUCOSE SERPL-MCNC: 127 MG/DL (ref 74–99)
GLUCOSE UR STRIP.AUTO-MCNC: NORMAL MG/DL
HCT VFR BLD AUTO: 43 % (ref 36–46)
HGB BLD-MCNC: 14 G/DL (ref 12–16)
IMM GRANULOCYTES # BLD AUTO: 0.05 X10*3/UL (ref 0–0.7)
IMM GRANULOCYTES NFR BLD AUTO: 0.4 % (ref 0–0.9)
KETONES UR STRIP.AUTO-MCNC: ABNORMAL MG/DL
LACTATE SERPL-SCNC: 1.6 MMOL/L (ref 0.4–2)
LEUKOCYTE ESTERASE UR QL STRIP.AUTO: ABNORMAL
LYMPHOCYTES # BLD AUTO: 2.93 X10*3/UL (ref 1.2–4.8)
LYMPHOCYTES NFR BLD AUTO: 24.1 %
MCH RBC QN AUTO: 30 PG (ref 26–34)
MCHC RBC AUTO-ENTMCNC: 32.6 G/DL (ref 32–36)
MCV RBC AUTO: 92 FL (ref 80–100)
MONOCYTES # BLD AUTO: 0.9 X10*3/UL (ref 0.1–1)
MONOCYTES NFR BLD AUTO: 7.4 %
NEUTROPHILS # BLD AUTO: 8.06 X10*3/UL (ref 1.2–7.7)
NEUTROPHILS NFR BLD AUTO: 66.2 %
NITRITE UR QL STRIP.AUTO: NEGATIVE
NRBC BLD-RTO: 0 /100 WBCS (ref 0–0)
PH UR STRIP.AUTO: 5.5 [PH]
PLATELET # BLD AUTO: 343 X10*3/UL (ref 150–450)
POTASSIUM SERPL-SCNC: 3.8 MMOL/L (ref 3.5–5.3)
PROT SERPL-MCNC: 7.1 G/DL (ref 6.4–8.2)
PROT UR STRIP.AUTO-MCNC: ABNORMAL MG/DL
RBC # BLD AUTO: 4.67 X10*6/UL (ref 4–5.2)
RBC # UR STRIP.AUTO: ABNORMAL MG/DL
RBC #/AREA URNS AUTO: >20 /HPF
SODIUM SERPL-SCNC: 138 MMOL/L (ref 136–145)
SP GR UR STRIP.AUTO: 1.01
UROBILINOGEN UR STRIP.AUTO-MCNC: NORMAL MG/DL
WBC # BLD AUTO: 12.2 X10*3/UL (ref 4.4–11.3)
WBC #/AREA URNS AUTO: >50 /HPF

## 2025-05-01 PROCEDURE — 83605 ASSAY OF LACTIC ACID: CPT | Performed by: STUDENT IN AN ORGANIZED HEALTH CARE EDUCATION/TRAINING PROGRAM

## 2025-05-01 PROCEDURE — 74176 CT ABD & PELVIS W/O CONTRAST: CPT | Performed by: STUDENT IN AN ORGANIZED HEALTH CARE EDUCATION/TRAINING PROGRAM

## 2025-05-01 PROCEDURE — 36415 COLL VENOUS BLD VENIPUNCTURE: CPT | Performed by: STUDENT IN AN ORGANIZED HEALTH CARE EDUCATION/TRAINING PROGRAM

## 2025-05-01 PROCEDURE — 85025 COMPLETE CBC W/AUTO DIFF WBC: CPT | Performed by: STUDENT IN AN ORGANIZED HEALTH CARE EDUCATION/TRAINING PROGRAM

## 2025-05-01 PROCEDURE — 87086 URINE CULTURE/COLONY COUNT: CPT | Mod: STJLAB | Performed by: EMERGENCY MEDICINE

## 2025-05-01 PROCEDURE — 80053 COMPREHEN METABOLIC PANEL: CPT | Performed by: EMERGENCY MEDICINE

## 2025-05-01 PROCEDURE — 85025 COMPLETE CBC W/AUTO DIFF WBC: CPT | Performed by: EMERGENCY MEDICINE

## 2025-05-01 PROCEDURE — 96365 THER/PROPH/DIAG IV INF INIT: CPT

## 2025-05-01 PROCEDURE — 74176 CT ABD & PELVIS W/O CONTRAST: CPT

## 2025-05-01 PROCEDURE — 99284 EMERGENCY DEPT VISIT MOD MDM: CPT | Mod: 25 | Performed by: EMERGENCY MEDICINE

## 2025-05-01 PROCEDURE — 2500000004 HC RX 250 GENERAL PHARMACY W/ HCPCS (ALT 636 FOR OP/ED): Mod: JZ

## 2025-05-01 PROCEDURE — 96361 HYDRATE IV INFUSION ADD-ON: CPT

## 2025-05-01 PROCEDURE — 2500000004 HC RX 250 GENERAL PHARMACY W/ HCPCS (ALT 636 FOR OP/ED): Mod: JZ | Performed by: STUDENT IN AN ORGANIZED HEALTH CARE EDUCATION/TRAINING PROGRAM

## 2025-05-01 PROCEDURE — 99285 EMERGENCY DEPT VISIT HI MDM: CPT | Performed by: STUDENT IN AN ORGANIZED HEALTH CARE EDUCATION/TRAINING PROGRAM

## 2025-05-01 PROCEDURE — 81003 URINALYSIS AUTO W/O SCOPE: CPT | Performed by: EMERGENCY MEDICINE

## 2025-05-01 PROCEDURE — 83605 ASSAY OF LACTIC ACID: CPT | Performed by: EMERGENCY MEDICINE

## 2025-05-01 PROCEDURE — 80053 COMPREHEN METABOLIC PANEL: CPT | Performed by: STUDENT IN AN ORGANIZED HEALTH CARE EDUCATION/TRAINING PROGRAM

## 2025-05-01 RX ORDER — CEFPODOXIME PROXETIL 200 MG/1
200 TABLET, FILM COATED ORAL 2 TIMES DAILY
Qty: 28 TABLET | Refills: 0 | Status: SHIPPED | OUTPATIENT
Start: 2025-05-01 | End: 2025-05-15

## 2025-05-01 RX ORDER — CEFTRIAXONE 1 G/50ML
1 INJECTION, SOLUTION INTRAVENOUS ONCE
Status: COMPLETED | OUTPATIENT
Start: 2025-05-01 | End: 2025-05-01

## 2025-05-01 RX ADMIN — CEFTRIAXONE 1 G: 1 INJECTION, SOLUTION INTRAVENOUS at 20:49

## 2025-05-01 RX ADMIN — SODIUM CHLORIDE, SODIUM LACTATE, POTASSIUM CHLORIDE, AND CALCIUM CHLORIDE 1000 ML: .6; .31; .03; .02 INJECTION, SOLUTION INTRAVENOUS at 20:42

## 2025-05-01 ASSESSMENT — PAIN SCALES - GENERAL: PAINLEVEL_OUTOF10: 2

## 2025-05-01 ASSESSMENT — COLUMBIA-SUICIDE SEVERITY RATING SCALE - C-SSRS
2. HAVE YOU ACTUALLY HAD ANY THOUGHTS OF KILLING YOURSELF?: NO
6. HAVE YOU EVER DONE ANYTHING, STARTED TO DO ANYTHING, OR PREPARED TO DO ANYTHING TO END YOUR LIFE?: NO
1. IN THE PAST MONTH, HAVE YOU WISHED YOU WERE DEAD OR WISHED YOU COULD GO TO SLEEP AND NOT WAKE UP?: NO

## 2025-05-01 ASSESSMENT — PAIN - FUNCTIONAL ASSESSMENT: PAIN_FUNCTIONAL_ASSESSMENT: 0-10

## 2025-05-01 ASSESSMENT — PAIN DESCRIPTION - PAIN TYPE: TYPE: ACUTE PAIN

## 2025-05-02 ENCOUNTER — TELEPHONE (OUTPATIENT)
Dept: UROLOGY | Facility: CLINIC | Age: 60
End: 2025-05-02
Payer: COMMERCIAL

## 2025-05-02 LAB
HOLD SPECIMEN: 293
HOLD SPECIMEN: NORMAL

## 2025-05-02 NOTE — TELEPHONE ENCOUNTER
Patient called and said she was in the Er with blood in urine yesterday asking if she needs repeat urine.

## 2025-05-02 NOTE — DISCHARGE INSTRUCTIONS
Please take cefpodoxime 200 mg 2 times a day for 14 days.  Please follow-up or notify your primary care provider and urology about your recent ER visit.    Please return to the ER or seek immediate medical attention if you experience new or worsening abdominal pain, persistent vomiting, persistent diarrhea, black tar stools, fever of 38C (100.4) or higher, chest pain, shortness of breath, or worsening of your current symptoms.    You are welcome back any time. Thank you for entrusting your care to us, I hope we made your visit as pleasant as possible. Wishing you well!    Dr. Romo

## 2025-05-02 NOTE — ED PROVIDER NOTES
Emergency Department Provider Note        History of Present Illness     History provided by: Patient  Limitations to History: None  External Records Reviewed with Brief Summary: Discharge Summary from 2/25/2025 which showed discharge for ulcerative colitis with bleeding. As well as H&P from 2/23    HPI:  Jolly Adame is a 59 y.o. female past medical history significant for ulcerative colitis on mesalamine, hypertension, hyperlipidemia, GERD presenting to the emergency department due to concern for right-sided flank pain, hematuria and frequency and burning with urination.  Patient states she was having ulcerative colitis flareup earlier today around 2 PM when she noticed what appeared to be blood in the toilet bowl.  She had multiple episodes of diarrhea that she felt was bloody until she took her emergency UC flareup medication Welchol which she states works for stopping her diarrhea.  After that she continued to have urinary frequency urgency and dysuria and noticed very dark bloody urine.  She has previously had follow-up with urology who did a cystoscopy and noted no hematuria.  She also is complaining of 2 out of 10 pressure-like flank pain on the left side since then.  Denies any nausea or vomiting, denies any chest pain/shortness of breath, patient states she is never had a renal stone.   Physical Exam   Triage vitals:  T 36.2 °C (97.2 °F)  HR (!) 106  /85  RR 18  O2 97 % None (Room air)    Physical Exam  Vitals and nursing note reviewed.   Constitutional:       Appearance: Normal appearance.   HENT:      Head: Normocephalic and atraumatic.      Nose: Nose normal.      Mouth/Throat:      Mouth: Mucous membranes are moist.   Eyes:      Extraocular Movements: Extraocular movements intact.      Pupils: Pupils are equal, round, and reactive to light.   Cardiovascular:      Rate and Rhythm: Regular rhythm. Tachycardia present.      Pulses: Normal pulses.   Pulmonary:      Effort: Pulmonary effort is  normal.      Breath sounds: Normal breath sounds.   Abdominal:      General: Abdomen is flat. Bowel sounds are normal.      Palpations: Abdomen is soft.      Tenderness: There is no abdominal tenderness. There is left CVA tenderness.   Musculoskeletal:         General: Normal range of motion.      Cervical back: Normal range of motion.      Right lower leg: No edema.      Left lower leg: No edema.   Skin:     General: Skin is warm.      Capillary Refill: Capillary refill takes less than 2 seconds.   Neurological:      General: No focal deficit present.      Mental Status: She is alert and oriented to person, place, and time.          Medical Decision Making & ED Course   Medical Decision Makin y.o. female past medical history and HPI presenting with hematuria and left-sided flank pain.  Workup to include UA, CMP, CBC.  Patient was mildly tachycardic and leukocytosis on initial evaluation, lactate added to workup.  Initial lactate of 1.6.  Patient does have a source with a UA showing 500 leukocyte esterase, greater than 50 WBCs and greater than 20 RBCs.  Patient does not appear to be septic shock however is meeting 2 SIRS criteria will likely secondary to dehydration in the setting of UC flare up earlier today.  Will treat tachycardia with 1 L of LR.  Will treat suspected hemorrhagic cystitis with ceftriaxone.  Cannot rule out infected renal stone without CT abdomen pelvis.  CT abdomen pelvis ordered revealing no nephrolithiasis or hydronephrosis, there is scattered colonic wall thickening consistent with history of ulcerative colitis as well as diffuse hepatic steatosis.  Patient notified of these findings.  Given flank pain, hematuria and UA concerning for infection, patient likely has complicated UTI versus pyelonephritis.  Patient given a initial dose of ceftriaxone and can be sent home with 14 days of cefpodoxime.  Patient given strict return precautions regarding worsening symptoms and advised to  follow-up with urology as well as primary care.  Patient will agreeable to this plan, discharged in stable condition.  ----    Differential diagnoses considered include but are not limited to: Nephrolithiasis, septic stone, hemorrhagic cystitis, ulcerative colitis flare, enterobladder fistula     Social Determinants of Health which Significantly Impact Care: None identified The following actions were taken to address these social determinants: n/a    EKG Independent Interpretation: EKG not obtained    Independent Result Review and Interpretation: Relevant laboratory and radiographic results were reviewed and independently interpreted by myself.  As necessary, they are commented on in the ED Course.    Chronic conditions affecting the patient's care: As documented above in MDM    The patient was discussed with the following consultants/services: None    Care Considerations: As documented above in [facility]    ED Course:  ED Course as of 05/01/25 2216   Thu May 01, 2025   2159 CT abdomen pelvis wo IV contrast  1. No nephrolithiasis or hydronephrosis.  2. Scattered areas of colonic wall thickening in the ascending colon,  transverse colon and splenic flexure may represent mild colitis in  keeping with patient's history of ulcerative colitis.  3. Diffuse hepatic steatosis.   [IS]      ED Course User Index  [IS] Daniel Romo MD         Diagnoses as of 05/01/25 2216   Pyelonephritis   Hemorrhagic cystitis     Disposition   Discharge    Procedures   Procedures    Patient seen and discussed with ED attending physician.    Daniel Romo MD  Emergency Medicine     Daniel Romo MD  Resident  05/01/25 2216

## 2025-05-04 LAB — BACTERIA UR CULT: ABNORMAL

## 2025-05-08 ENCOUNTER — TELEPHONE (OUTPATIENT)
Dept: PHARMACY | Facility: HOSPITAL | Age: 60
End: 2025-05-08
Payer: COMMERCIAL

## 2025-05-08 NOTE — PROGRESS NOTES
"  Jolly Adame is a 59 y.o. female with past medical history of HTN, hyperlipidemia, GERD, and cholecystectomy 1995 who presents today for ulcerative colitis. Diagnosed with UC age 20. Initially treated with Asacol which worked well. Steroids intermittently for flares.     1995 Hospitalized for severe flare during lots of stress. After this did very well for many years on Apriso. Did not have any flares or need steroids for over a decade. At baseline alternating diarrhea and constipation. May skip a few days and then have BM. Occasional diarrhea, mild infrequent abdominal pain.      12/2023 Colonoscopy normal. All biopsies normal. Colonoscopy prior to that was around 2020 in [place] (no records). Reportedly normal.      2/2025 Hospitalized for UC flare with severe abdominal pain and over 10 bloody BM after returning from [place] (however symptoms started 10 days after returning home). Given IV steroids and antibiotics although stool pathogen panel negative. Discharged on prednisone 40 mg taper.    3/2025 Fecal calprotectin 77.    Presents today for follow up. Completed prednisone taper. She is now feeling well. Continues on Apriso 4 daily. Having 2-3 BM per day which is her baseline. Occasional diarrhea which she feels is due to stress. No bleeding. No abdominal pain, nausea, vomiting.     Surgical history: Tonsillectomy, hysterectomy, and cholecystectomy.     Social history: No tobacco. Some alcohol a few days a week. Denies illicit drug use.     Family history: Sister has Crohn's disease. Other sister has IBS. Believes maternal grandfather had colon cancer.    Current Medications[1]      Review of Systems  Review of Systems negative except as noted in HPI.    Objective     /81   Pulse 82   Temp 36.8 °C (98.2 °F)   Ht 1.6 m (5' 3\")   Wt 74.4 kg (164 lb)   BMI 29.05 kg/m²      Physical Exam  Constitutional:  No acute distress. Normal appearance. Not ill-appearing.  HENT:  Head normocephalic and " atraumatic. Conjunctivae normal.  Cardiovascular:  Normal rate. Regular rhythm.  Pulmonary:  Pulmonary effort normal. No respiratory distress. Breath sounds clear.  Abdominal:  Abdomen is flat and soft. There is no distension. No tenderness or guarding.  Skin: Dry.  Neurological:  Alert and oriented.  Psychiatric:  Mood and affect normal.    Assessment/Plan     59 y.o. female with history of HTN, hyperlipidemia, GERD, and cholecystectomy who presents today for follow up of UC. UC diagnosed age 20, in long-term clinical and endoscopic remission with Apriso. She did not have flare in about a decade with her last 2 colonoscopies 2020 and 2023 show a deep remission with no evidence of IBD.      Earlier this year she was hospitalized for abdominal pain and bloody diarrhea, which she feels was due to stress. She completed prednisone taper with improvement. Fecal calprotectin is reassuring at 77. As long as she continues to feel well we will plan to continue her current medication and plan for repeat colonoscopy 3-5 year interval. However should her symptoms return or stool become elevated she will require sooner colonoscopy to re-evaluate disease and guide medication management. This was discussed today and she is in agreement.     Recommendations  Continue Apriso 4 caps daily.  Follow up in 6 months.    Electronically signed by: Lynn Barrios CNP on 5/16/2025 at 11:22 AM          [1]   Current Outpatient Medications   Medication Sig Dispense Refill    ascorbic acid (Vitamin C) 500 mg tablet Take 1 tablet (500 mg) by mouth once daily.      busPIRone (Buspar) 15 mg tablet TAKE 1/2 TABLET BY MOUTH DAILY 90 tablet 3    cefpodoxime (Vantin) 200 mg tablet Take 1 tablet (200 mg) by mouth 2 times a day for 14 days. 28 tablet 0    cholecalciferol (Vitamin D-3) 50 mcg (2,000 unit) capsule Take 1 capsule (50 mcg) by mouth once daily.      colesevelam (Welchol) 625 mg tablet Take 1 tablet (625 mg) by mouth if needed (diarrhea flare  up).      dilTIAZem CD (Cardizem CD) 120 mg 24 hr capsule TAKE ONE CAPSULE BY MOUTH EVERY DAY 90 capsule 0    EPINEPHrine (EpiPen) 0.3 mg/0.3 mL injection syringe Inject 0.3 mL (0.3 mg) into the muscle if needed for anaphylaxis. Inject into upper leg. Call 911 after use. 0.3 mL 1    melatonin 10 mg tablet Take 1 tablet (10 mg) by mouth once daily at bedtime.      mesalamine ER (Apriso) 0.375 gram 24 hr capsule Take 4 capsules (1.5 g) by mouth once daily. Do not crush or chew. 360 capsule 3    metoprolol succinate XL (Toprol-XL) 25 mg 24 hr tablet TAKE ONE TABLET BY MOUTH EVERY MORNING AND TAKE 1 TABLET BEFORE BEDTIME.  DO NOT CRUSH OR CHEW 60 tablet 11    multivit-min/ferrous fumarate (MULTI VITAMIN ORAL) Take 1 tablet by mouth once daily.      nortriptyline (Pamelor) 50 mg capsule TAKE 2 CAPSULES BY MOUTH EVERY NIGHT 60 capsule 11    omeprazole (PriLOSEC) 20 mg capsule,delayed release(DR/EC) Take 1 capsule (20 mg) by mouth once daily at noon. Take with meals. Patient requires brand name Prilosec      simvastatin (Zocor) 20 mg tablet TAKE ONE TABLET BY MOUTH AT BEDTIME 90 tablet 3    valsartan (Diovan) 80 mg tablet Take 1 tablet (80 mg) by mouth once daily. 90 tablet 3     No current facility-administered medications for this visit.

## 2025-05-08 NOTE — PROGRESS NOTES
EDPD Note: Duration Adjust    Contacted Jolly Adame regarding positive urine culture/result that was taken during their recent emergency room visit. I completed education with patient. The patient is being treated with the proper medication; however, the duration of the discharge prescription will be adjusted due to symptom improvement. I gave verbal education about the new medication duration found below. No further follow up needed from EDPD Team.     Patient presented to the ED for right-sided pain, hematuria, frequency and dysuria.  At time of call, patient reports complete resolution of symptoms, and that she is doing well. Counseled on change in duration. Advised patient to finish full course of antibiotic and follow up with PCP or urgent care if symptoms do not completely resolve.      Discharged with: Cefpodoxime 200mg BID x14  Drug: Cefpodoxime 200mg  Sig: BID x7  Days Supply: 7    Susceptibility data from last 90 days.  Collected Specimen Info Organism Amoxicillin/Clavulanate Ampicillin Ampicillin/Sulbactam Cefazolin Cefazolin (uncomplicated UTIs only) Ciprofloxacin Gentamicin Levofloxacin Nitrofurantoin Piperacillin/Tazobactam   05/01/25 Urine from Clean Catch/Voided Escherichia coli  S  R  I  S  S  S  S  S  S  S     Collected Specimen Info Organism Trimethoprim/Sulfamethoxazole   05/01/25 Urine from Clean Catch/Voided Escherichia coli  S       If there are any other questions for the ED Post-Discharge Culture Follow Up Team, please contact [phone]. Fax: [phone].    Janene Funk, DrewD

## 2025-05-09 ENCOUNTER — PATIENT OUTREACH (OUTPATIENT)
Dept: PRIMARY CARE | Facility: CLINIC | Age: 60
End: 2025-05-09
Payer: COMMERCIAL

## 2025-05-12 DIAGNOSIS — E11.9 TYPE 2 DIABETES MELLITUS WITHOUT COMPLICATION, WITHOUT LONG-TERM CURRENT USE OF INSULIN: ICD-10-CM

## 2025-05-16 ENCOUNTER — OFFICE VISIT (OUTPATIENT)
Dept: GASTROENTEROLOGY | Facility: CLINIC | Age: 60
End: 2025-05-16
Payer: COMMERCIAL

## 2025-05-16 VITALS
DIASTOLIC BLOOD PRESSURE: 81 MMHG | HEIGHT: 63 IN | WEIGHT: 164 LBS | BODY MASS INDEX: 29.06 KG/M2 | SYSTOLIC BLOOD PRESSURE: 126 MMHG | HEART RATE: 82 BPM | TEMPERATURE: 98.2 F

## 2025-05-16 DIAGNOSIS — K51.00 ULCERATIVE PANCOLITIS WITHOUT COMPLICATION (MULTI): Primary | ICD-10-CM

## 2025-05-16 PROCEDURE — 3074F SYST BP LT 130 MM HG: CPT | Performed by: NURSE PRACTITIONER

## 2025-05-16 PROCEDURE — 3079F DIAST BP 80-89 MM HG: CPT | Performed by: NURSE PRACTITIONER

## 2025-05-16 PROCEDURE — 3008F BODY MASS INDEX DOCD: CPT | Performed by: NURSE PRACTITIONER

## 2025-05-16 PROCEDURE — 99214 OFFICE O/P EST MOD 30 MIN: CPT | Performed by: NURSE PRACTITIONER

## 2025-05-16 PROCEDURE — 4010F ACE/ARB THERAPY RXD/TAKEN: CPT | Performed by: NURSE PRACTITIONER

## 2025-05-16 ASSESSMENT — PAIN SCALES - GENERAL: PAINLEVEL_OUTOF10: 0-NO PAIN

## 2025-05-24 DIAGNOSIS — I10 HYPERTENSION, ESSENTIAL: ICD-10-CM

## 2025-05-25 RX ORDER — DILTIAZEM HYDROCHLORIDE 120 MG/1
120 CAPSULE, COATED, EXTENDED RELEASE ORAL DAILY
Qty: 90 CAPSULE | Refills: 0 | Status: SHIPPED | OUTPATIENT
Start: 2025-05-25

## 2025-05-30 ENCOUNTER — APPOINTMENT (OUTPATIENT)
Dept: GASTROENTEROLOGY | Facility: CLINIC | Age: 60
End: 2025-05-30
Payer: COMMERCIAL

## 2025-07-01 DIAGNOSIS — K51.911: ICD-10-CM

## 2025-07-01 DIAGNOSIS — E11.9 TYPE 2 DIABETES MELLITUS WITHOUT COMPLICATION, WITHOUT LONG-TERM CURRENT USE OF INSULIN: ICD-10-CM

## 2025-07-01 DIAGNOSIS — I10 HYPERTENSION, ESSENTIAL: ICD-10-CM

## 2025-07-03 DIAGNOSIS — K51.919 ULCERATIVE COLITIS WITH COMPLICATION, UNSPECIFIED LOCATION (MULTI): ICD-10-CM

## 2025-07-03 DIAGNOSIS — F41.9 ANXIETY: ICD-10-CM

## 2025-07-03 DIAGNOSIS — E78.5 HYPERLIPIDEMIA, UNSPECIFIED HYPERLIPIDEMIA TYPE: ICD-10-CM

## 2025-07-03 DIAGNOSIS — I10 HYPERTENSION, ESSENTIAL: ICD-10-CM

## 2025-07-03 RX ORDER — METOPROLOL SUCCINATE 25 MG/1
TABLET, EXTENDED RELEASE ORAL
Qty: 60 TABLET | Refills: 11 | Status: SHIPPED | OUTPATIENT
Start: 2025-07-03 | End: 2025-08-02

## 2025-07-03 NOTE — TELEPHONE ENCOUNTER
"Refill Request     Requested Prescriptions     Pending Prescriptions Disp Refills    metoprolol succinate XL (Toprol-XL) 25 mg 24 hr tablet [Pharmacy Med Name: Metoprolol Succinate ER Oral Tablet Extended Release 24 Hour 25 MG] 60 tablet 0     Sig: TAKE ONE TABLET BY MOUTH EVERY MORNING AND TAKE ONE TABLET BEFORE BEDTIME. DO NOT CRUSH OR CHEW          Last OV with PCP 3/12/2025     Future Appointments       Date / Time Provider Department Dept Phone    7/10/2025 3:20 PM Brittani Patricio APRN-CNP [facility] [phone]    7/11/2025 1:00 PM Lynn Barrios APRN-CNP [facility] [phone]    7/16/2025 4:00 PM (Arrive by 3:45 PM) Rozina Raza MD  [facility] [phone]    10/17/2025 2:00 PM (Arrive by 1:45 PM) [facility] 1 [facility] [phone]    11/14/2025 2:00 PM Lynn Barrios APRN-[facility] [phone]          Lab Results   Component Value Date    HGBA1C 7.4 (H) 03/12/2025     Lab Results   Component Value Date    CHOL 150 10/02/2024    CHOL 126 02/14/2024    CHOL 146 09/13/2023     Lab Results   Component Value Date    HDL 15.8 10/02/2024    HDL 20.9 02/14/2024    HDL 22.8 (A) 09/13/2023     Lab Results   Component Value Date    LDLCALC 69 10/02/2024    LDLCALC 66 02/14/2024     Lab Results   Component Value Date    TRIG 326 (H) 10/02/2024    TRIG 195 (H) 02/14/2024    TRIG 192 (H) 09/13/2023     No components found for: \"CHOLHDL\"  Lab Results   Component Value Date    TSH 3.09 10/02/2024     Lab Results   Component Value Date    GLUCOSE 127 (H) 05/01/2025    CALCIUM 10.0 05/01/2025     05/01/2025    K 3.8 05/01/2025    CO2 28 05/01/2025     05/01/2025    BUN 11 05/01/2025    CREATININE 0.80 05/01/2025     VITD25   Date/Time Value Ref Range Status   10/02/2024 03:07 PM 34 30 - 100 ng/mL Final         "

## 2025-07-07 NOTE — PROGRESS NOTES
"  Jolly Adame is a 60 y.o. female with past medical history of HTN, hyperlipidemia, GERD, and cholecystectomy 1995 who presents today for ulcerative colitis. Diagnosed with UC age 20. Initially treated with Asacol which worked well.      1995 Hospitalized for severe flare during lots of stress. After this did very well for many years on Apriso. Did not have any flares or need steroids for over a decade.      12/2023 Colonoscopy and path normal. Colonoscopy prior to this around 2020 in [place] (no records) reportedly normal.      2/2025 Hospitalized for UC flare with severe abdominal pain and over 10 bloody BM after returning from [place]. Given IV steroids and antibiotics although stool pathogen panel negative. Discharged on prednisone taper with improvement.     3/2025 Fecal calprotectin 77.    Presents today for follow up. Continues on Apriso. Feeling well. Bowels fluctuate at baseline. May have 2-3 in one day and then skip a few days. No blood or pain.     Surgical history: Tonsillectomy, hysterectomy, and cholecystectomy.     Social history: No tobacco. Some alcohol a few days a week. Denies illicit drug use.     Current Medications[1]      Review of Systems  Review of Systems negative except as noted in HPI.    Objective     /80   Pulse 89   Temp 36.7 °C (98 °F)   Ht 1.6 m (5' 3\")   Wt 72.9 kg (160 lb 12.8 oz)   BMI 28.48 kg/m²      Physical Exam  Constitutional:  No acute distress. Normal appearance. Not ill-appearing.  HENT:  Head normocephalic and atraumatic. Conjunctivae normal.  Cardiovascular:  Normal rate. Regular rhythm.  Pulmonary:  Pulmonary effort normal. No respiratory distress. Breath sounds clear.  Abdominal:  Abdomen is flat and soft. There is no distension. No tenderness or guarding.  Skin: Dry.  Neurological:  Alert and oriented.  Psychiatric:  Mood and affect normal.    Assessment/Plan     60 y.o. female with history of HTN, hyperlipidemia, GERD, and cholecystectomy who presents " today for follow up of UC. UC diagnosed age 20, in long-term clinical and endoscopic remission with Apriso with her last 2 colonoscopies 2020 and 2023 show a deep remission with no evidence of IBD. We will continue her current medication regimen.     Recommendations  Continue Apriso 4 caps daily.  Obtain labs as ordered by PCP next week.  Follow up in 6 months.  Anticipate colonoscopy 3-5 year interval.    Electronically signed by: Lynn Barrios CNP on 7/11/2025 at 1:15 PM          [1]   Current Outpatient Medications   Medication Sig Dispense Refill    ascorbic acid (Vitamin C) 500 mg tablet Take 1 tablet (500 mg) by mouth once daily.      busPIRone (Buspar) 15 mg tablet TAKE 1/2 TABLET BY MOUTH DAILY 90 tablet 3    cholecalciferol (Vitamin D-3) 50 mcg (2,000 unit) capsule Take 1 capsule (50 mcg) by mouth once daily.      colesevelam (Welchol) 625 mg tablet Take 1 tablet (625 mg) by mouth if needed (diarrhea flare up).      dilTIAZem CD (Cardizem CD) 120 mg 24 hr capsule TAKE ONE CAPSULE BY MOUTH EVERY DAY 90 capsule 0    EPINEPHrine (EpiPen) 0.3 mg/0.3 mL injection syringe Inject 0.3 mL (0.3 mg) into the muscle if needed for anaphylaxis. Inject into upper leg. Call 911 after use. 0.3 mL 1    estradiol (Estrace) 0.01 % (0.1 mg/gram) vaginal cream Apply pea sized amount into vagina nightly for 2 weeks, then 3 times per week. 42.5 g 5    melatonin 10 mg tablet Take 1 tablet (10 mg) by mouth once daily at bedtime.      mesalamine ER (Apriso) 0.375 gram 24 hr capsule Take 4 capsules (1.5 g) by mouth once daily. Do not crush or chew. 360 capsule 3    metoprolol succinate XL (Toprol-XL) 25 mg 24 hr tablet TAKE ONE TABLET BY MOUTH EVERY MORNING AND TAKE ONE TABLET BEFORE BEDTIME. DO NOT CRUSH OR CHEW 60 tablet 11    multivit-min/ferrous fumarate (MULTI VITAMIN ORAL) Take 1 tablet by mouth once daily.      nortriptyline (Pamelor) 50 mg capsule TAKE 2 CAPSULES BY MOUTH EVERY NIGHT 60 capsule 11    omeprazole (PriLOSEC) 20 mg  capsule,delayed release(DR/EC) Take 1 capsule (20 mg) by mouth once daily at noon. Take with meals. Patient requires brand name Prilosec      simvastatin (Zocor) 20 mg tablet TAKE ONE TABLET BY MOUTH AT BEDTIME 90 tablet 3    valsartan (Diovan) 80 mg tablet Take 1 tablet (80 mg) by mouth once daily. 90 tablet 3     No current facility-administered medications for this visit.

## 2025-07-10 ENCOUNTER — APPOINTMENT (OUTPATIENT)
Dept: UROLOGY | Facility: CLINIC | Age: 60
End: 2025-07-10
Payer: COMMERCIAL

## 2025-07-10 VITALS — TEMPERATURE: 96.7 F | SYSTOLIC BLOOD PRESSURE: 139 MMHG | HEART RATE: 92 BPM | DIASTOLIC BLOOD PRESSURE: 77 MMHG

## 2025-07-10 DIAGNOSIS — R31.9 HEMATURIA, UNSPECIFIED TYPE: Primary | ICD-10-CM

## 2025-07-10 DIAGNOSIS — N39.0 RECURRENT UTI: ICD-10-CM

## 2025-07-10 LAB
POC APPEARANCE, URINE: CLEAR
POC BILIRUBIN, URINE: NEGATIVE
POC BLOOD, URINE: NEGATIVE
POC COLOR, URINE: YELLOW
POC GLUCOSE, URINE: NEGATIVE MG/DL
POC KETONES, URINE: NEGATIVE MG/DL
POC LEUKOCYTES, URINE: NEGATIVE
POC NITRITE,URINE: NEGATIVE
POC PH, URINE: 5.5 PH
POC PROTEIN, URINE: ABNORMAL MG/DL
POC SPECIFIC GRAVITY, URINE: 1.02
POC UROBILINOGEN, URINE: 0.2 EU/DL

## 2025-07-10 PROCEDURE — 3075F SYST BP GE 130 - 139MM HG: CPT | Performed by: NURSE PRACTITIONER

## 2025-07-10 PROCEDURE — 3078F DIAST BP <80 MM HG: CPT | Performed by: NURSE PRACTITIONER

## 2025-07-10 PROCEDURE — 81003 URINALYSIS AUTO W/O SCOPE: CPT | Performed by: NURSE PRACTITIONER

## 2025-07-10 PROCEDURE — 99214 OFFICE O/P EST MOD 30 MIN: CPT | Performed by: NURSE PRACTITIONER

## 2025-07-10 PROCEDURE — 4010F ACE/ARB THERAPY RXD/TAKEN: CPT | Performed by: NURSE PRACTITIONER

## 2025-07-10 RX ORDER — ESTRADIOL 0.1 MG/G
CREAM VAGINAL
Qty: 42.5 G | Refills: 5 | Status: SHIPPED | OUTPATIENT
Start: 2025-07-10 | End: 2026-07-10

## 2025-07-10 NOTE — PROGRESS NOTES
Subjective   Patient ID: Jolly Adame is a 60 y.o. female who presents for Follow-up.  Patient developed recurrent symptoms of gross hematuria in Nov in the absence of infection , again treated with antibiotics. Denies history of nephrolithiasis. States this previously happened in Oct 2020 when she lived in [place]. Underwent TURBT at that time which was unremarkable.       She underwent CT imaging in February but this was without contrast as she is intolerant to it. This was to evaluate her UC. Unremarkable cysto earlier this year with Dr. Hunter which was unremarkable.     Recurrent gross hematuria in May. Proceeded to ER. Positive culture for e.coli, no bacteria on micro. Symptoms concerning for pyelo, treated accordingly with resolution. Patient is on d-mannose for UTI prevention.         Review of Systems   All other systems reviewed and are negative.      Objective   Physical Exam  Vitals reviewed.     Alert and oriented x3  Moist mucous membranes  Breathes easily on room air  Abdomen soft, nondistended  No edema  No scleral icterus  No focal neurological deficits  Appears stated age, no acute distress    === 05/01/25 ===    CT ABDOMEN PELVIS WO IV CONTRAST    - Impression -  1. No nephrolithiasis or hydronephrosis.  2. Scattered areas of colonic wall thickening in the ascending colon,  transverse colon and splenic flexure may represent mild colitis in  keeping with patient's history of ulcerative colitis.  3. Diffuse hepatic steatosis.    MACRO:  None    Signed by: Hunter Stevens 5/1/2025 9:45 PM  Dictation workstation:   [id number]    Office Visit on 07/10/2025   Component Date Value Ref Range Status    POC Color, Urine 07/10/2025 Yellow  Straw, Yellow, Light-Yellow Final    POC Appearance, Urine 07/10/2025 Clear  Clear Final    POC Glucose, Urine 07/10/2025 NEGATIVE  NEGATIVE mg/dl Final    POC Bilirubin, Urine 07/10/2025 NEGATIVE  NEGATIVE Final    POC Ketones, Urine 07/10/2025 NEGATIVE  NEGATIVE mg/dl  Final    POC Specific Gravity, Urine 07/10/2025 1.025  1.005 - 1.035 Final    POC Blood, Urine 07/10/2025 NEGATIVE  NEGATIVE Final    POC PH, Urine 07/10/2025 5.5  No Reference Range Established PH Final    POC Protein, Urine 07/10/2025 TRACE (A)  NEGATIVE mg/dl Final    POC Urobilinogen, Urine 07/10/2025 0.2  0.2, 1.0 EU/DL Final    Poc Nitrite, Urine 07/10/2025 NEGATIVE  NEGATIVE Final    POC Leukocytes, Urine 07/10/2025 NEGATIVE  NEGATIVE Final   Admission on 05/01/2025, Discharged on 05/01/2025   Component Date Value Ref Range Status    WBC 05/01/2025 12.2 (H)  4.4 - 11.3 x10*3/uL Final    nRBC 05/01/2025 0.0  0.0 - 0.0 /100 WBCs Final    RBC 05/01/2025 4.67  4.00 - 5.20 x10*6/uL Final    Hemoglobin 05/01/2025 14.0  12.0 - 16.0 g/dL Final    Hematocrit 05/01/2025 43.0  36.0 - 46.0 % Final    MCV 05/01/2025 92  80 - 100 fL Final    MCH 05/01/2025 30.0  26.0 - 34.0 pg Final    MCHC 05/01/2025 32.6  32.0 - 36.0 g/dL Final    RDW 05/01/2025 13.1  11.5 - 14.5 % Final    Platelets 05/01/2025 343  150 - 450 x10*3/uL Final    Neutrophils % 05/01/2025 66.2  40.0 - 80.0 % Final    Immature Granulocytes %, Automated 05/01/2025 0.4  0.0 - 0.9 % Final    Immature Granulocyte Count (IG) includes promyelocytes, myelocytes and metamyelocytes but does not include bands. Percent differential counts (%) should be interpreted in the context of the absolute cell counts (cells/UL).    Lymphocytes % 05/01/2025 24.1  13.0 - 44.0 % Final    Monocytes % 05/01/2025 7.4  2.0 - 10.0 % Final    Eosinophils % 05/01/2025 1.4  0.0 - 6.0 % Final    Basophils % 05/01/2025 0.5  0.0 - 2.0 % Final    Neutrophils Absolute 05/01/2025 8.06 (H)  1.20 - 7.70 x10*3/uL Final    Percent differential counts (%) should be interpreted in the context of the absolute cell counts (cells/uL).    Immature Granulocytes Absolute, Au* 05/01/2025 0.05  0.00 - 0.70 x10*3/uL Final    Lymphocytes Absolute 05/01/2025 2.93  1.20 - 4.80 x10*3/uL Final    Monocytes Absolute  05/01/2025 0.90  0.10 - 1.00 x10*3/uL Final    Eosinophils Absolute 05/01/2025 0.17  0.00 - 0.70 x10*3/uL Final    Basophils Absolute 05/01/2025 0.06  0.00 - 0.10 x10*3/uL Final    Glucose 05/01/2025 127 (H)  74 - 99 mg/dL Final    Sodium 05/01/2025 138  136 - 145 mmol/L Final    Potassium 05/01/2025 3.8  3.5 - 5.3 mmol/L Final    Chloride 05/01/2025 100  98 - 107 mmol/L Final    Bicarbonate 05/01/2025 28  21 - 32 mmol/L Final    Anion Gap 05/01/2025 14  10 - 20 mmol/L Final    Urea Nitrogen 05/01/2025 11  6 - 23 mg/dL Final    Creatinine 05/01/2025 0.80  0.50 - 1.05 mg/dL Final    eGFR 05/01/2025 86  >60 mL/min/1.73m*2 Final    Calculations of estimated GFR are performed using the 2021 CKD-EPI Study Refit equation without the race variable for the IDMS-Traceable creatinine methods.  https://jasn.asnjournals.org/content/early/2021/09/22/ASN.3851171565    Calcium 05/01/2025 10.0  8.6 - 10.3 mg/dL Final    Albumin 05/01/2025 4.8  3.4 - 5.0 g/dL Final    Alkaline Phosphatase 05/01/2025 63  33 - 110 U/L Final    Total Protein 05/01/2025 7.1  6.4 - 8.2 g/dL Final    AST 05/01/2025 32  9 - 39 U/L Final    Bilirubin, Total 05/01/2025 0.7  0.0 - 1.2 mg/dL Final    ALT 05/01/2025 60 (H)  7 - 45 U/L Final    Patients treated with Sulfasalazine may generate falsely decreased results for ALT.    Lactate 05/01/2025 1.6  0.4 - 2.0 mmol/L Final    Color, Urine 05/01/2025 Dark-Brown (N)  Light-Yellow, Yellow, Dark-Yellow Final    Appearance, Urine 05/01/2025 Ex.Turbid (N)  Clear Final    Specific Gravity, Urine 05/01/2025 1.013  1.005 - 1.035 Final    pH, Urine 05/01/2025 5.5  5.0, 5.5, 6.0, 6.5, 7.0, 7.5, 8.0 Final    Protein, Urine 05/01/2025 70 (1+) (A)  NEGATIVE, 10 (TRACE), 20 (TRACE) mg/dL Final    Glucose, Urine 05/01/2025 Normal  Normal mg/dL Final    Blood, Urine 05/01/2025 OVER (3+) (A)  NEGATIVE mg/dL Final    Ketones, Urine 05/01/2025 TRACE (A)  NEGATIVE mg/dL Final    Bilirubin, Urine 05/01/2025 NEGATIVE  NEGATIVE  mg/dL Final    Urobilinogen, Urine 05/01/2025 Normal  Normal mg/dL Final    Nitrite, Urine 05/01/2025 NEGATIVE  NEGATIVE Final    Leukocyte Esterase, Urine 05/01/2025 500 Aston/uL (A)  NEGATIVE Final    Extra Tube 05/01/2025 Hold for add-ons.   Final    Auto resulted.    Extra Tube 05/01/2025 293   Final    WBC, Urine 05/01/2025 >50 (A)  1-5, NONE /HPF Final    RBC, Urine 05/01/2025 >20 (A)  NONE, 1-2, 3-5 /HPF Final    Urine Culture 05/01/2025 >=100,000 CFU/mL Escherichia coli (A)   Final         Assessment/Plan   Diagnoses and all orders for this visit:  Hematuria, unspecified type  -     POCT UA Automated manually resulted  Recurrent UTI  -     estradiol (Estrace) 0.01 % (0.1 mg/gram) vaginal cream; Apply pea sized amount into vagina nightly for 2 weeks, then 3 times per week.  -     Urinalysis with Reflex Microscopic; Standing    Plan to initiate vaginal estradiol for UTI prevention and continue on d-mannose. Will discuss possible MR urogram with Dr. Hunter. Utilize standing order if symptomatic. Plan for 6 month follow up or sooner if needed.          Brittani Patricio, CROW-CNP 07/10/25 3:42 PM

## 2025-07-10 NOTE — Clinical Note
Hi! Unfortunately she again had gross hematuria. Unremarkable CT in the ER. She is intolerant of IV contrast. Do you think it warrants MR urogram? Thanks

## 2025-07-11 ENCOUNTER — APPOINTMENT (OUTPATIENT)
Dept: GASTROENTEROLOGY | Facility: CLINIC | Age: 60
End: 2025-07-11
Payer: COMMERCIAL

## 2025-07-11 VITALS
HEART RATE: 89 BPM | DIASTOLIC BLOOD PRESSURE: 80 MMHG | HEIGHT: 63 IN | SYSTOLIC BLOOD PRESSURE: 143 MMHG | BODY MASS INDEX: 28.49 KG/M2 | TEMPERATURE: 98 F | WEIGHT: 160.8 LBS

## 2025-07-11 DIAGNOSIS — K51.00 ULCERATIVE PANCOLITIS WITHOUT COMPLICATION (MULTI): Primary | ICD-10-CM

## 2025-07-11 PROCEDURE — 3008F BODY MASS INDEX DOCD: CPT | Performed by: NURSE PRACTITIONER

## 2025-07-11 PROCEDURE — 99212 OFFICE O/P EST SF 10 MIN: CPT | Performed by: NURSE PRACTITIONER

## 2025-07-11 PROCEDURE — 4010F ACE/ARB THERAPY RXD/TAKEN: CPT | Performed by: NURSE PRACTITIONER

## 2025-07-11 PROCEDURE — 99214 OFFICE O/P EST MOD 30 MIN: CPT | Performed by: NURSE PRACTITIONER

## 2025-07-11 PROCEDURE — 3079F DIAST BP 80-89 MM HG: CPT | Performed by: NURSE PRACTITIONER

## 2025-07-11 PROCEDURE — 3077F SYST BP >= 140 MM HG: CPT | Performed by: NURSE PRACTITIONER

## 2025-07-11 ASSESSMENT — PAIN SCALES - GENERAL: PAINLEVEL_OUTOF10: 0-NO PAIN

## 2025-07-11 NOTE — PATIENT INSTRUCTIONS
Recommendations  Continue Apriso 4 caps daily.  Obtain labs as ordered by PCP next week.  Follow up in 6 months.  Anticipate colonoscopy 3-5 year interval.

## 2025-07-13 LAB
ALBUMIN SERPL-MCNC: 4.4 G/DL (ref 3.6–5.1)
ALP SERPL-CCNC: 75 U/L (ref 37–153)
ALT SERPL-CCNC: 43 U/L (ref 6–29)
ANION GAP SERPL CALCULATED.4IONS-SCNC: 11 MMOL/L (CALC) (ref 7–17)
AST SERPL-CCNC: 35 U/L (ref 10–35)
BASOPHILS # BLD AUTO: 53 CELLS/UL (ref 0–200)
BASOPHILS NFR BLD AUTO: 0.7 %
BILIRUB SERPL-MCNC: 0.8 MG/DL (ref 0.2–1.2)
BUN SERPL-MCNC: 18 MG/DL (ref 7–25)
CALCIUM SERPL-MCNC: 9.4 MG/DL (ref 8.6–10.4)
CHLORIDE SERPL-SCNC: 105 MMOL/L (ref 98–110)
CO2 SERPL-SCNC: 25 MMOL/L (ref 20–32)
CREAT SERPL-MCNC: 0.66 MG/DL (ref 0.5–1.03)
EGFRCR SERPLBLD CKD-EPI 2021: 101 ML/MIN/1.73M2
EOSINOPHIL # BLD AUTO: 198 CELLS/UL (ref 15–500)
EOSINOPHIL NFR BLD AUTO: 2.6 %
ERYTHROCYTE [DISTWIDTH] IN BLOOD BY AUTOMATED COUNT: 12.3 % (ref 11–15)
EST. AVERAGE GLUCOSE BLD GHB EST-MCNC: 151 MG/DL
EST. AVERAGE GLUCOSE BLD GHB EST-SCNC: 8.4 MMOL/L
GLUCOSE SERPL-MCNC: 154 MG/DL (ref 65–99)
HBA1C MFR BLD: 6.9 %
HCT VFR BLD AUTO: 45.2 % (ref 35–45)
HGB BLD-MCNC: 14.3 G/DL (ref 11.7–15.5)
LYMPHOCYTES # BLD AUTO: 2918 CELLS/UL (ref 850–3900)
LYMPHOCYTES NFR BLD AUTO: 38.4 %
MCH RBC QN AUTO: 30.2 PG (ref 27–33)
MCHC RBC AUTO-ENTMCNC: 31.6 G/DL (ref 32–36)
MCV RBC AUTO: 95.4 FL (ref 80–100)
MONOCYTES # BLD AUTO: 707 CELLS/UL (ref 200–950)
MONOCYTES NFR BLD AUTO: 9.3 %
NEUTROPHILS # BLD AUTO: 3724 CELLS/UL (ref 1500–7800)
NEUTROPHILS NFR BLD AUTO: 49 %
PLATELET # BLD AUTO: 331 THOUSAND/UL (ref 140–400)
PMV BLD REES-ECKER: 9.4 FL (ref 7.5–12.5)
POTASSIUM SERPL-SCNC: 4.2 MMOL/L (ref 3.5–5.3)
PROT SERPL-MCNC: 6.8 G/DL (ref 6.1–8.1)
RBC # BLD AUTO: 4.74 MILLION/UL (ref 3.8–5.1)
SODIUM SERPL-SCNC: 141 MMOL/L (ref 135–146)
WBC # BLD AUTO: 7.6 THOUSAND/UL (ref 3.8–10.8)

## 2025-07-14 ENCOUNTER — APPOINTMENT (OUTPATIENT)
Dept: PRIMARY CARE | Facility: CLINIC | Age: 60
End: 2025-07-14
Payer: COMMERCIAL

## 2025-07-16 ENCOUNTER — APPOINTMENT (OUTPATIENT)
Dept: PRIMARY CARE | Facility: CLINIC | Age: 60
End: 2025-07-16
Payer: COMMERCIAL

## 2025-07-16 VITALS
OXYGEN SATURATION: 96 % | TEMPERATURE: 97.3 F | DIASTOLIC BLOOD PRESSURE: 72 MMHG | WEIGHT: 158 LBS | HEIGHT: 63 IN | BODY MASS INDEX: 28 KG/M2 | HEART RATE: 80 BPM | SYSTOLIC BLOOD PRESSURE: 118 MMHG

## 2025-07-16 DIAGNOSIS — E55.9 VITAMIN D DEFICIENCY: ICD-10-CM

## 2025-07-16 DIAGNOSIS — Z12.31 SCREENING MAMMOGRAM FOR BREAST CANCER: ICD-10-CM

## 2025-07-16 DIAGNOSIS — E78.2 MIXED HYPERLIPIDEMIA: ICD-10-CM

## 2025-07-16 DIAGNOSIS — K21.9 GASTROESOPHAGEAL REFLUX DISEASE, UNSPECIFIED WHETHER ESOPHAGITIS PRESENT: ICD-10-CM

## 2025-07-16 DIAGNOSIS — Z00.00 WELL ADULT EXAM: Primary | ICD-10-CM

## 2025-07-16 DIAGNOSIS — E11.9 TYPE 2 DIABETES MELLITUS WITHOUT COMPLICATION, WITHOUT LONG-TERM CURRENT USE OF INSULIN: ICD-10-CM

## 2025-07-16 DIAGNOSIS — I10 HYPERTENSION, ESSENTIAL: ICD-10-CM

## 2025-07-16 DIAGNOSIS — R74.8 ELEVATED LIVER ENZYMES: ICD-10-CM

## 2025-07-16 DIAGNOSIS — F41.9 ANXIETY: ICD-10-CM

## 2025-07-16 PROCEDURE — 1036F TOBACCO NON-USER: CPT | Performed by: INTERNAL MEDICINE

## 2025-07-16 PROCEDURE — 4010F ACE/ARB THERAPY RXD/TAKEN: CPT | Performed by: INTERNAL MEDICINE

## 2025-07-16 PROCEDURE — 99214 OFFICE O/P EST MOD 30 MIN: CPT | Performed by: INTERNAL MEDICINE

## 2025-07-16 PROCEDURE — 3008F BODY MASS INDEX DOCD: CPT | Performed by: INTERNAL MEDICINE

## 2025-07-16 PROCEDURE — 99396 PREV VISIT EST AGE 40-64: CPT | Performed by: INTERNAL MEDICINE

## 2025-07-16 PROCEDURE — 3074F SYST BP LT 130 MM HG: CPT | Performed by: INTERNAL MEDICINE

## 2025-07-16 PROCEDURE — 3078F DIAST BP <80 MM HG: CPT | Performed by: INTERNAL MEDICINE

## 2025-07-16 RX ORDER — BUSPIRONE HYDROCHLORIDE 15 MG/1
7.5 TABLET ORAL 2 TIMES DAILY
Start: 2025-07-16

## 2025-07-16 NOTE — PROGRESS NOTES
"Subjective       Current Issues:  Current concerns include feels well  Intermittent fasting  .1-9 pm    Sleep: all night  No bowel or bladder issues  No cp or sob or depression  Work is stressful  Review of Nutrition:  Current diet: discussed  Dec sugar  Iced tea and water non sweet  Honey growing  Lots eggs  Gi said doing well  No colonoscopy for one year   Exercise discussed    Gen:  no fever  HEENT:  no trouble swallowing  CV:  no dyspnea, cyanosis  Lungs:  no shortness of breath  GI:  no constipation, no blood in stool  Vascular:  no edema  Neuro:   no weakness  Skin:  no rash  MS:no joint swelling  Gu:  no urinary complaints  All other systems have been reviewed and are negative for complaint      Depression Screen  (Note: if answer to either of the following is \"Yes\", then a more complete depression screening is indicated)   Q1: Over the past two weeks, have you felt down, depressed or hopeless? No  Q2: Over the past two weeks, have you felt little interest or pleasure in doing things? No    Screening Questions:  Objective   /72   Pulse 80   Temp 36.3 °C (97.3 °F)   Ht 1.6 m (5' 3\")   Wt 71.7 kg (158 lb)   SpO2 96%   BMI 27.99 kg/m²       General:   alert and oriented, in no acute distress   Gait:   normal   Skin:   normal   Oral cavity:   lips, mucosa, and tongue normal; teeth and gums normal   Eyes:   sclerae white, pupils equal and reactive   Ears:   normal bilaterally Tms grey   Neck:   no adenopathy and thyroid not enlarged, symmetric, no tenderness/mass/nodules   Lungs:  clear to auscultation bilaterally   Heart:   regular rate and rhythm, S1, S2 normal, no murmur, click, rub or gallop   Abdomen:  soft, non-tender; bowel sounds normal; no masses, no organomegaly   : ne       Extremities:  extremities normal, warm and well-perfused; no cyanosis, clubbing, or edema,   Neuro:  normal without focal findings and muscle tone and strength normal and symmetric       Jolly was seen today for annual " exam.  Diagnoses and all orders for this visit:  Well adult exam (Primary)  Vitamin D deficiency  -     Hemoglobin A1C; Future  -     Comprehensive Metabolic Panel; Future  -     TSH with reflex to Free T4 if abnormal; Future  -     Vitamin D 25-Hydroxy,Total (for eval of Vitamin D levels); Future  -     Lipid Panel; Future  -     CBC and Auto Differential; Future  -     Vitamin B12; Future  Mixed hyperlipidemia  -     Hemoglobin A1C; Future  -     Comprehensive Metabolic Panel; Future  -     TSH with reflex to Free T4 if abnormal; Future  -     Lipid Panel; Future  -     CBC and Auto Differential; Future  -     Vitamin B12; Future  Hypertension, essential  -     Hemoglobin A1C; Future  -     Comprehensive Metabolic Panel; Future  -     TSH with reflex to Free T4 if abnormal; Future  -     Lipid Panel; Future  -     CBC and Auto Differential; Future  -     Vitamin B12; Future  Gastroesophageal reflux disease, unspecified whether esophagitis present  -     Hemoglobin A1C; Future  -     Comprehensive Metabolic Panel; Future  -     TSH with reflex to Free T4 if abnormal; Future  -     Lipid Panel; Future  -     CBC and Auto Differential; Future  -     Vitamin B12; Future  Elevated liver enzymes  -     Hemoglobin A1C; Future  -     Comprehensive Metabolic Panel; Future  -     TSH with reflex to Free T4 if abnormal; Future  -     Lipid Panel; Future  -     CBC and Auto Differential; Future  -     Vitamin B12; Future  Anxiety  -     Hemoglobin A1C; Future  -     Comprehensive Metabolic Panel; Future  -     TSH with reflex to Free T4 if abnormal; Future  -     Lipid Panel; Future  -     CBC and Auto Differential; Future  -     Vitamin B12; Future  -     busPIRone (Buspar) 15 mg tablet; Take 0.5 tablets (7.5 mg) by mouth 2 times a day.  Type 2 diabetes mellitus without complication, without long-term current use of insulin  -     Hemoglobin A1C; Future  -     Comprehensive Metabolic Panel; Future  -     TSH with reflex to  Free T4 if abnormal; Future  -     Lipid Panel; Future  -     CBC and Auto Differential; Future  -     Vitamin B12; Future  Screening mammogram for breast cancer  -     BI mammo bilateral screening tomosynthesis; Future         Chronic conditions reviewed in the assessment and plan.    Continue medications unless specified otherwise.  Previous labs reviewed.   Other specialty provider notes reviewed.    Follow up in 6 months or prn

## 2025-07-25 NOTE — PROGRESS NOTES
Subjective   Patient ID: Jolly Adame is a 59 y.o. female     HPI  59 y.o.  patient as a referral from Brittani Orellana   with complaints of gross hematuria presenting for cystoscopic evaluation today 3/13/2025    The patient Developed recurrent symptoms of gross hematuria in Nov in the absence of infection , again treated with antibiotics. Denies history of nephrolithiasis. States this previously happened in Oct 2020 when she lived in [place]. Underwent TURBT at that time which was unremarkable.      She underwent CT imaging in February but this was without contrast as she is intolerant to it. This was to evaluate her UC.  Last seen by Brittani Orellana 1/16/2025 :  59 y.o. female who presents for recurrent UTI  and Blood in Urine.  Patient presents for evaluation of UTI. Describes 1st UTI in decades in August. Treated with antibiotic without full resolution of symptoms , treated with 2nd course of antibiotics. Culture identified E. Coli.     Developed recurrent symptoms in Nov in the absence of infection with gross hematuria, again treated with antibiotics. Denies history of nephrolithiasis. States this previously happened in Oct 2020 when she lived in [place]. Underwent TURBT at that time which was unremarkable.     Hysterectomy in 1998. Recently , previously  in 2011. Never smoked, previously had significant 2nd had smoke exposure. Denies drug use.     Unremarkable non contrast CT in Nov.     Assessment/Plan   Diagnoses and all orders for this visit:  UTI symptoms  Hematuria, unspecified type  -     Referral to Urology  -     POCT UA Automated manually resulted  -     Post-Void Residual  -     Urinalysis with Reflex Culture and Microscopic; Future  -     Non-gynecologic cytology; Future  -     Cystourethroscopy; Future    Discussed further evaluation given recurrent gross hematuria. Reviewed unremarkable CT. Urine to be sent for culture and cytology. Will plan for follow up cysto. Patient is  in agreement with plan.     Review of Systems  PHYSICAL EXAMINATION:  No LMP recorded. Patient has had a hysterectomy.  There is no height or weight on file to calculate BMI.  Visit Vitals  /75   Pulse 94   Temp 36.1 °C (96.9 °F)   OB Status Hysterectomy   Smoking Status Never     General Appearance: well appearing  Neuro: Alert and oriented       Urine dip:   Recent Results (from the past 6 hours)   POCT UA Automated manually resulted    Collection Time: 03/13/25  3:11 PM   Result Value Ref Range    POC Color, Urine Yellow Straw, Yellow, Light-Yellow    POC Appearance, Urine Clear Clear    POC Glucose, Urine 500 (3+) (A) NEGATIVE mg/dl    POC Bilirubin, Urine NEGATIVE NEGATIVE    POC Ketones, Urine NEGATIVE NEGATIVE mg/dl    POC Specific Gravity, Urine >=1.030 1.005 - 1.035    POC Blood, Urine TRACE-Intact (A) NEGATIVE    POC PH, Urine 6.0 No Reference Range Established PH    POC Protein, Urine NEGATIVE NEGATIVE mg/dl    POC Urobilinogen, Urine 0.2 0.2, 1.0 EU/DL    Poc Nitrite, Urine NEGATIVE NEGATIVE    POC Leukocytes, Urine NEGATIVE NEGATIVE       Imaging and results:    Patient ID: Jolly Adame is a 59 y.o. female.    Procedures      PROCEDURE NOTE:     OPERATION:  Flexible Cystourethroscopy     SURGEON: Av Hunter MD     ANESTHESIA:  2% lidocaine jelly     COMPLICATIONS:  None     SPECIMEN:  Voided urine was not collected and submitted for cytology.     Estimated Blood Loss: Minimal      Complications: None      Patient presented for cystoscopy. They were brought to the procedure room, they were consented, the patient was prepped in normal fashion and placed on the cystobed.     A flexible scope was inserted and the entire urethra and bladder were examined.? A complete cystoscopy was performed. There were no mucosal lesions noted. The ureteral orifices were in normal location.  Mild trabeculation noted throughout. No tumors or stones noted. Area of resection of left bladder wall with no overlying  mucosal changes.      The patient tolerated the procedure well. There were no complications. Routine post-cystoscopy instructions were given.    Assessment/Plan     Jolly Adame is a 59 y.o. female with gross hematuria concerns presenting for cystoscopic evaluation today 3/13/2025    Patient underwent negative cystoscopic evaluation today.The patient tolerated the procedure well. There were no complications    We discussed if she has persistent gross hematuria complaints, she may contact the clinic and we will proceed with a Ct with IV contrast.    Scribe Attestation  By signing my name below, IQuiana Scribe attest that this documentation has been prepared under the direction and in the presence of Av Hunter MD. All medical record entries made by the Scribe were at my direction or personally dictated by me. I have reviewed the chart and agree that the record accurately reflects my personal performance of the history, physical exam, discussion and plan.     Detail Level: Detailed Depth Of Biopsy: dermis Was A Bandage Applied: Yes Size Of Lesion In Cm: 0.3 X Size Of Lesion In Cm: 0 Biopsy Type: H and E Biopsy Method: Personna blade Anesthesia Type: 1% lidocaine with 1:200,000 epinephrine Anesthesia Volume In Cc: 0.5 Hemostasis: Aluminum Chloride Wound Care: Petrolatum Dressing: bandage Destruction After The Procedure: No Type Of Destruction Used: Curettage Curettage Text: The wound bed was treated with curettage after the biopsy was performed. Cryotherapy Text: The wound bed was treated with cryotherapy after the biopsy was performed. Electrodesiccation Text: The wound bed was treated with electrodesiccation after the biopsy was performed. Electrodesiccation And Curettage Text: The wound bed was treated with electrodesiccation and curettage after the biopsy was performed. Silver Nitrate Text: The wound bed was treated with silver nitrate after the biopsy was performed. Lab: 6 Lab Facility: 3 Medical Necessity Information: It is in your best interest to select a reason for this procedure from the list below. All of these items fulfill various CMS LCD requirements except the new and changing color options. Consent: Written consent was obtained and risks were reviewed including but not limited to scarring, infection, bleeding, scabbing, incomplete removal, nerve damage and allergy to anesthesia. Post-Care Instructions: I reviewed with the patient in detail post-care instructions. Patient is to keep the biopsy clean with warm, soapy water, and then apply vaseline once daily until healed. Patient may apply hydrogen peroxide soaks to remove any crusting. Notification Instructions: Patient will be notified of biopsy results. However, patient instructed to call the office if not contacted within 2 weeks. Billing Type: Third-Party Bill Information: Selecting Yes will display possible errors in your note based on the variables you have selected. This validation is only offered as a suggestion for you. PLEASE NOTE THAT THE VALIDATION TEXT WILL BE REMOVED WHEN YOU FINALIZE YOUR NOTE. IF YOU WANT TO FAX A PRELIMINARY NOTE YOU WILL NEED TO TOGGLE THIS TO 'NO' IF YOU DO NOT WANT IT IN YOUR FAXED NOTE.

## 2025-08-01 DIAGNOSIS — N39.0 RECURRENT UTI: ICD-10-CM

## 2025-08-23 DIAGNOSIS — I10 HYPERTENSION, ESSENTIAL: ICD-10-CM

## 2025-08-24 RX ORDER — DILTIAZEM HYDROCHLORIDE 120 MG/1
120 CAPSULE, COATED, EXTENDED RELEASE ORAL DAILY
Qty: 90 CAPSULE | Refills: 3 | Status: SHIPPED | OUTPATIENT
Start: 2025-08-24

## 2025-08-25 DIAGNOSIS — R31.0 GROSS HEMATURIA: Primary | ICD-10-CM

## 2025-08-25 DIAGNOSIS — Z91.030 BEE ALLERGY STATUS: Primary | ICD-10-CM

## 2025-08-25 RX ORDER — PREDNISONE 50 MG/1
TABLET ORAL
Qty: 3 TABLET | Refills: 0 | Status: SHIPPED | OUTPATIENT
Start: 2025-08-25

## 2025-08-29 DIAGNOSIS — N39.0 RECURRENT UTI: ICD-10-CM

## 2025-09-04 ENCOUNTER — HOSPITAL ENCOUNTER (OUTPATIENT)
Dept: RADIOLOGY | Facility: CLINIC | Age: 60
Discharge: HOME | End: 2025-09-04
Payer: COMMERCIAL

## 2025-09-04 DIAGNOSIS — Z12.31 SCREENING MAMMOGRAM FOR BREAST CANCER: ICD-10-CM

## 2025-09-04 PROCEDURE — 77063 BREAST TOMOSYNTHESIS BI: CPT | Performed by: RADIOLOGY

## 2025-09-04 PROCEDURE — 77067 SCR MAMMO BI INCL CAD: CPT | Performed by: RADIOLOGY

## 2025-09-04 PROCEDURE — 77063 BREAST TOMOSYNTHESIS BI: CPT

## 2025-09-11 ENCOUNTER — APPOINTMENT (OUTPATIENT)
Dept: RADIOLOGY | Facility: CLINIC | Age: 60
End: 2025-09-11
Payer: COMMERCIAL

## 2026-01-08 ENCOUNTER — APPOINTMENT (OUTPATIENT)
Dept: UROLOGY | Facility: CLINIC | Age: 61
End: 2026-01-08
Payer: COMMERCIAL

## 2026-01-30 ENCOUNTER — APPOINTMENT (OUTPATIENT)
Dept: PRIMARY CARE | Facility: CLINIC | Age: 61
End: 2026-01-30
Payer: COMMERCIAL

## 2026-07-24 ENCOUNTER — APPOINTMENT (OUTPATIENT)
Dept: PRIMARY CARE | Facility: CLINIC | Age: 61
End: 2026-07-24
Payer: COMMERCIAL